# Patient Record
Sex: MALE | Race: ASIAN | NOT HISPANIC OR LATINO | Employment: OTHER | URBAN - METROPOLITAN AREA
[De-identification: names, ages, dates, MRNs, and addresses within clinical notes are randomized per-mention and may not be internally consistent; named-entity substitution may affect disease eponyms.]

---

## 2017-01-06 ENCOUNTER — PATIENT MESSAGE (OUTPATIENT)
Dept: INTERNAL MEDICINE | Facility: CLINIC | Age: 82
End: 2017-01-06

## 2017-01-07 ENCOUNTER — LAB VISIT (OUTPATIENT)
Dept: LAB | Facility: HOSPITAL | Age: 82
End: 2017-01-07
Attending: INTERNAL MEDICINE
Payer: COMMERCIAL

## 2017-01-07 DIAGNOSIS — E78.2 MIXED HYPERLIPIDEMIA: ICD-10-CM

## 2017-01-07 DIAGNOSIS — I10 HTN (HYPERTENSION), BENIGN: ICD-10-CM

## 2017-01-07 DIAGNOSIS — E11.49 DM (DIABETES MELLITUS), TYPE 2 WITH NEUROLOGICAL COMPLICATIONS: ICD-10-CM

## 2017-01-07 LAB
ALBUMIN SERPL BCP-MCNC: 3.3 G/DL
ALP SERPL-CCNC: 76 U/L
ALT SERPL W/O P-5'-P-CCNC: 14 U/L
ANION GAP SERPL CALC-SCNC: 8 MMOL/L
AST SERPL-CCNC: 21 U/L
BILIRUB SERPL-MCNC: 0.7 MG/DL
BUN SERPL-MCNC: 23 MG/DL
CALCIUM SERPL-MCNC: 9.2 MG/DL
CHLORIDE SERPL-SCNC: 103 MMOL/L
CHOLEST/HDLC SERPL: 4 {RATIO}
CO2 SERPL-SCNC: 25 MMOL/L
CREAT SERPL-MCNC: 1 MG/DL
EST. GFR  (AFRICAN AMERICAN): >60 ML/MIN/1.73 M^2
EST. GFR  (NON AFRICAN AMERICAN): >60 ML/MIN/1.73 M^2
GLUCOSE SERPL-MCNC: 119 MG/DL
HDL/CHOLESTEROL RATIO: 25 %
HDLC SERPL-MCNC: 172 MG/DL
HDLC SERPL-MCNC: 43 MG/DL
LDLC SERPL CALC-MCNC: 91 MG/DL
NONHDLC SERPL-MCNC: 129 MG/DL
POTASSIUM SERPL-SCNC: 4.5 MMOL/L
PROT SERPL-MCNC: 6.6 G/DL
SODIUM SERPL-SCNC: 136 MMOL/L
T4 FREE SERPL-MCNC: 1 NG/DL
TRIGL SERPL-MCNC: 190 MG/DL
TSH SERPL DL<=0.005 MIU/L-ACNC: 2.47 UIU/ML

## 2017-01-07 PROCEDURE — 80053 COMPREHEN METABOLIC PANEL: CPT

## 2017-01-07 PROCEDURE — 84439 ASSAY OF FREE THYROXINE: CPT

## 2017-01-07 PROCEDURE — 36415 COLL VENOUS BLD VENIPUNCTURE: CPT | Mod: PO

## 2017-01-07 PROCEDURE — 83036 HEMOGLOBIN GLYCOSYLATED A1C: CPT

## 2017-01-07 PROCEDURE — 84443 ASSAY THYROID STIM HORMONE: CPT

## 2017-01-07 PROCEDURE — 80061 LIPID PANEL: CPT

## 2017-01-09 LAB
ESTIMATED AVG GLUCOSE: 154 MG/DL
HBA1C MFR BLD HPLC: 7 %

## 2017-01-11 ENCOUNTER — TELEPHONE (OUTPATIENT)
Dept: ENDOCRINOLOGY | Facility: CLINIC | Age: 82
End: 2017-01-11

## 2017-01-13 ENCOUNTER — OFFICE VISIT (OUTPATIENT)
Dept: ENDOCRINOLOGY | Facility: CLINIC | Age: 82
End: 2017-01-13
Payer: COMMERCIAL

## 2017-01-13 VITALS
DIASTOLIC BLOOD PRESSURE: 72 MMHG | WEIGHT: 155 LBS | HEART RATE: 71 BPM | BODY MASS INDEX: 24.91 KG/M2 | SYSTOLIC BLOOD PRESSURE: 115 MMHG | HEIGHT: 66 IN

## 2017-01-13 DIAGNOSIS — E78.1 PURE HYPERGLYCERIDEMIA: ICD-10-CM

## 2017-01-13 DIAGNOSIS — E11.49 DM (DIABETES MELLITUS), TYPE 2 WITH NEUROLOGICAL COMPLICATIONS: Primary | ICD-10-CM

## 2017-01-13 DIAGNOSIS — R10.13 EPIGASTRIC PAIN: ICD-10-CM

## 2017-01-13 DIAGNOSIS — I10 ESSENTIAL HYPERTENSION: ICD-10-CM

## 2017-01-13 DIAGNOSIS — M85.80 OSTEOPENIA: ICD-10-CM

## 2017-01-13 PROCEDURE — 1159F MED LIST DOCD IN RCRD: CPT | Mod: S$GLB,,, | Performed by: INTERNAL MEDICINE

## 2017-01-13 PROCEDURE — 99214 OFFICE O/P EST MOD 30 MIN: CPT | Mod: S$GLB,,, | Performed by: INTERNAL MEDICINE

## 2017-01-13 PROCEDURE — 1157F ADVNC CARE PLAN IN RCRD: CPT | Mod: S$GLB,,, | Performed by: INTERNAL MEDICINE

## 2017-01-13 PROCEDURE — 99999 PR PBB SHADOW E&M-EST. PATIENT-LVL III: CPT | Mod: PBBFAC,,, | Performed by: INTERNAL MEDICINE

## 2017-01-13 PROCEDURE — 1160F RVW MEDS BY RX/DR IN RCRD: CPT | Mod: S$GLB,,, | Performed by: INTERNAL MEDICINE

## 2017-01-13 PROCEDURE — 1126F AMNT PAIN NOTED NONE PRSNT: CPT | Mod: S$GLB,,, | Performed by: INTERNAL MEDICINE

## 2017-01-13 NOTE — PROGRESS NOTES
Subjective:      Patient ID: Sherri Bunch is a 89 y.o. male.    Chief Complaint:  Diabetes Mellitus    History of Present Illness  89 y/old male that comes with daughter who translate for him since he only speaks chinese. He has DM type 2 since 2015 being treated with diet. BG at home is in the 140's in AM. He has some cognitive dysfunction and occ misses to eat lunch. Since last month he is complaining of stomach discomfort and is eating less. + Tiredness since last month. + Nocturia that is frequent. No numbness or tingling of the feet. Has pain in left left. No blurry vision. Last eye exam was on 9/29/16. Has mild swelling of the legs. Weight gain of 2 lbs since last visit on 7/13/16. Has hypertriglyceridemia that is being monitored.    Review of Systems   Constitutional: Positive for fatigue.   HENT: Positive for postnasal drip. Negative for trouble swallowing.    Eyes: Negative for visual disturbance.   Respiratory: Negative for shortness of breath.    Cardiovascular: Positive for chest pain and leg swelling. Negative for palpitations.   Gastrointestinal: Positive for constipation. Negative for diarrhea.        Occasional   Endocrine: Negative for cold intolerance and heat intolerance.   Musculoskeletal: Positive for back pain. Negative for arthralgias.   Neurological: Negative for headaches.   Hematological: Does not bruise/bleed easily.   Psychiatric/Behavioral: Negative for sleep disturbance.   All other systems reviewed and are negative.      Objective:   Physical Exam   Constitutional: He is oriented to person, place, and time. He appears well-developed and well-nourished. No distress.   HENT:   Right Ear: External ear normal.   Left Ear: External ear normal.   Eyes: EOM are normal. Right eye exhibits discharge. No scleral icterus.   Dry secretion in right eye   Neck: Normal range of motion. Neck supple.   No thyroid nodules palpable   Cardiovascular: Normal rate, regular rhythm, normal heart sounds and  intact distal pulses.    No murmur heard.  Pulses:       Dorsalis pedis pulses are 2+ on the right side, and 2+ on the left side.   Pulmonary/Chest: Effort normal and breath sounds normal.   Abdominal: Soft. He exhibits no distension and no mass. There is no tenderness.   Musculoskeletal: He exhibits edema.        Right foot: There is normal range of motion and no deformity.        Left foot: There is normal range of motion and no deformity.   Gait impairment and he shuffles his feet when he walks. Trace edema in the ankles.    Feet:   Right Foot:   Protective Sensation: 10 sites tested. 10 sites sensed.   Skin Integrity: Positive for callus. Negative for ulcer, blister, skin breakdown, erythema, warmth or dry skin.   Left Foot:   Protective Sensation: 10 sites tested. 10 sites sensed.   Skin Integrity: Positive for callus. Negative for ulcer, blister, skin breakdown, erythema, warmth or dry skin.   Lymphadenopathy:     He has no cervical adenopathy.        Right: No supraclavicular adenopathy present.        Left: No supraclavicular adenopathy present.   Neurological: He is alert and oriented to person, place, and time.   Intact monofilament. Decrease vibration in the feet, R>L. DTR's are decreased.   Skin: Skin is warm and dry.   Psychiatric: He has a normal mood and affect. His behavior is normal.   Vitals reviewed.  FEET: + onychomycosis    Lab Review:    Results for orders placed or performed in visit on 01/07/17   Comprehensive metabolic panel   Result Value Ref Range    Sodium 136 136 - 145 mmol/L    Potassium 4.5 3.5 - 5.1 mmol/L    Chloride 103 95 - 110 mmol/L    CO2 25 23 - 29 mmol/L    Glucose 119 (H) 70 - 110 mg/dL    BUN, Bld 23 8 - 23 mg/dL    Creatinine 1.0 0.5 - 1.4 mg/dL    Calcium 9.2 8.7 - 10.5 mg/dL    Total Protein 6.6 6.0 - 8.4 g/dL    Albumin 3.3 (L) 3.5 - 5.2 g/dL    Total Bilirubin 0.7 0.1 - 1.0 mg/dL    Alkaline Phosphatase 76 55 - 135 U/L    AST 21 10 - 40 U/L    ALT 14 10 - 44 U/L     Anion Gap 8 8 - 16 mmol/L    eGFR if African American >60.0 >60 mL/min/1.73 m^2    eGFR if non African American >60.0 >60 mL/min/1.73 m^2   Hemoglobin A1c   Result Value Ref Range    Hemoglobin A1C 7.0 (H) 4.5 - 6.2 %    Estimated Avg Glucose 154 (H) 68 - 131 mg/dL   Lipid panel   Result Value Ref Range    Cholesterol 172 120 - 199 mg/dL    Triglycerides 190 (H) 30 - 150 mg/dL    HDL 43 40 - 75 mg/dL    LDL Cholesterol 91.0 63.0 - 159.0 mg/dL    HDL/Chol Ratio 25.0 20.0 - 50.0 %    Total Cholesterol/HDL Ratio 4.0 2.0 - 5.0    Non-HDL Cholesterol 129 mg/dL   TSH   Result Value Ref Range    TSH 2.467 0.400 - 4.000 uIU/mL   T4, free   Result Value Ref Range    Free T4 1.00 0.71 - 1.51 ng/dL       BONE MINERAL DENSITY RESULTS:  Lumbar Spine: Lumbar bone mineral density L1-L4 is 1.121g/cm2, which is a t-score of 0.3. The z-score is .    Total Hip: The total hip bone mineral density is 0.767g/cm2.  The t-score is -1.8, and the z-score is .  Femoral neck BMD is 0.636g/cm2 and the t-score is -2.2.      COMPARISONS: No Previous studies available.   Impression     Osteopenia of the hip. FRAX calculation does not support treatment for osteoporosis         Assessment:     1. DM (diabetes mellitus), type 2 with neurological complications     2. Pure hyperglyceridemia     3. Essential hypertension     4. Epigastric pain  Ambulatory Referral to Gastroenterology       1. 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 A1c is slightly above goal but it is fine for him. No treatment needed at this time except for diet and exercise as tolerated. No proteinuria or retinopathy.  Needs podiatry follow up of feet. To follow up with PCP. To send him back to us if A1c rises or BG > 200.  2. Hypertriglyceridemia that is not at goal with high Trig. No treatment needed and will monitor  3. HTN is controlled. To continue taking same meds  4. Abdominal discomfort affecting appetite. Will have him see GI  5. Osteopenia of the hip but no therapy  is needed. Last vitamin D level is normal. To continue taking calcium with D daily.     Plan:     RTC prn  Please schedule gastroenterology appt for gastro evaluation

## 2017-01-13 NOTE — MR AVS SNAPSHOT
[facility]  [address]  [facility] [address]  Phone: [phone]                  Sherri Bunch   2017 2:00 PM   Office Visit    Description:  Male : 10/24/1927   Provider:  Noemi Wing MD   Department:  [facility]           Reason for Visit     Diabetes Mellitus           Diagnoses this Visit        Comments    DM (diabetes mellitus), type 2 with neurological complications    -  Primary     Pure hyperglyceridemia         Essential hypertension         Epigastric pain         Osteopenia                To Do List           Future Appointments        Provider Department Dept Phone    2017 3:00 PM Guillaume Daly Jr., [facility] - Podiat[facility] [phone]      Goals (5 Years of Data)     None      Follow-Up and Disposition     Return if symptoms worsen or fail to improve.      [facility]     [facility] Nurse [facility] -  Assistance  Registered nurses in the [facility] On Call Center provide clinical advisement, health education, appointment booking, and other advisory services.  Call for this free service at [phone].             Medications           Message regarding Medications     Verify the changes and/or additions to your medication regime listed below are the same as discussed with your clinician today.  If any of these changes or additions are incorrect, please notify your healthcare provider.        STOP taking these medications     FLUZONE HIGH-DOSE -, PF, 180 mcg/0.5 mL Syrg     ZOSTAVAX, PF, 19,400 unit/0.65 mL injection TO BE ADMINISTERED BY PHARMACIST FOR IMMUNIZATION    escitalopram oxalate (LEXAPRO) 5 MG Tab Take 1 tablet (5 mg total) by mouth once daily.    fluticasone (FLONASE) 50 mcg/actuation nasal spray 2 sprays by Each Nare route once daily.    levocetirizine (XYZAL) 5 MG tablet Take 1 tablet (5 mg total) by mouth every evening.           Verify that the below list of medications is an accurate  "representation of the medications you are currently taking.  If none reported, the list may be blank. If incorrect, please contact your healthcare provider. Carry this list with you in case of emergency.           Current Medications     b complex vitamins capsule Take 1 capsule by mouth once daily.    CALCIUM CARBONATE/VITAMIN D3 (CALCIUM 600 WITH VITAMIN D3 ORAL) Take 1 tablet by mouth once daily.    fish oil-omega-3 fatty acids 300-1,000 mg capsule Take 1 g by mouth once daily.    hydrochlorothiazide (MICROZIDE) 12.5 mg capsule     LYCOPENE ORAL Take 5 mg by mouth once daily.    memantine (NAMENDA) 10 MG Tab Take 1 tablet (10 mg total) by mouth once daily.    metoprolol succinate (TOPROL-XL) 25 MG 24 hr tablet     multivitamin (ONE DAILY MULTIVITAMIN) per tablet Take 1 tablet by mouth once daily.    neomycin-polymyxin-hydrocortisone (CORTISPORIN) 3.5-10,000-1 mg/mL-unit/mL-% otic suspension Use bid for nails    PRADAXA 75 mg Cap     saw pal-pumpkin [id number] (SAW PALMETTO COMPLEX,PUMPK-ZN,) 320-40-10-15 mg Cap Take 1 capsule by mouth once daily.    FLUZONE HIGH-DOSE 2016-17, PF, 180 mcg/0.5 mL Syrg TO BE ADMINISTERED BY PHARMACIST FOR IMMUNIZATION           Clinical Reference Information           Vital Signs - Last Recorded  Most recent update: 1/13/2017  2:24 PM by Rufina Story MA    BP Pulse Ht Wt BMI    115/72 71 5' 6" (1.676 m) 70.3 kg (155 lb) 25.02 kg/m2      Blood Pressure          Most Recent Value    BP  115/72      Allergies as of 1/13/2017     No Known Allergies      Immunizations Administered on Date of Encounter - 1/13/2017     None      Orders Placed During Today's Visit      Normal Orders This Visit    Ambulatory Referral to Gastroenterology       "

## 2017-01-18 ENCOUNTER — OFFICE VISIT (OUTPATIENT)
Dept: PODIATRY | Facility: CLINIC | Age: 82
End: 2017-01-18
Payer: COMMERCIAL

## 2017-01-18 VITALS
SYSTOLIC BLOOD PRESSURE: 143 MMHG | BODY MASS INDEX: 24.91 KG/M2 | HEART RATE: 67 BPM | DIASTOLIC BLOOD PRESSURE: 62 MMHG | HEIGHT: 66 IN | WEIGHT: 155 LBS

## 2017-01-18 DIAGNOSIS — E11.49 DM (DIABETES MELLITUS), TYPE 2 WITH NEUROLOGICAL COMPLICATIONS: Primary | ICD-10-CM

## 2017-01-18 DIAGNOSIS — B35.1 ONYCHOMYCOSIS DUE TO DERMATOPHYTE: ICD-10-CM

## 2017-01-18 PROCEDURE — 99213 OFFICE O/P EST LOW 20 MIN: CPT | Mod: 25,S$GLB,, | Performed by: PODIATRIST

## 2017-01-18 PROCEDURE — 99999 PR PBB SHADOW E&M-EST. PATIENT-LVL III: CPT | Mod: PBBFAC,,, | Performed by: PODIATRIST

## 2017-01-18 NOTE — MR AVS SNAPSHOT
[place] - [facility]iatry   [facility]  [place] [address]  Phone: [phone]                  Sherri Bunch   2017 3:00 PM   Office Visit    Description:  Male : 10/24/1927   Provider:  Guillaume Daly Jr., DPM   Department:  [place] - Podiatry           Reason for Visit     Nail Care           Diagnoses this Visit        Comments    DM (diabetes mellitus), type 2 with neurological complications    -  Primary     Onychomycosis due to dermatophyte                To Do List           Future Appointments        Provider Department Dept Phone    2/3/2017 3:00 PM [facility] - G[facility] [phone]      Goals (5 Years of Data)     None      Follow-Up and Disposition     Return in about 6 months (around 2017).      [facility]     [facility] On Call Nurse [facility] -  Assistance  Registered nurses in the [facility] On Call Center provide clinical advisement, health education, appointment booking, and other advisory services.  Call for this free service at [phone].             Medications           Message regarding Medications     Verify the changes and/or additions to your medication regime listed below are the same as discussed with your clinician today.  If any of these changes or additions are incorrect, please notify your healthcare provider.             Verify that the below list of medications is an accurate representation of the medications you are currently taking.  If none reported, the list may be blank. If incorrect, please contact your healthcare provider. Carry this list with you in case of emergency.           Current Medications     b complex vitamins capsule Take 1 capsule by mouth once daily.    CALCIUM CARBONATE/VITAMIN D3 (CALCIUM 600 WITH VITAMIN D3 ORAL) Take 1 tablet by mouth once daily.    fish oil-omega-3 fatty acids 300-1,000 mg capsule Take 1 g by mouth once daily.    FLUZONE HIGH-DOSE , PF, 180 mcg/0.5 mL Syrg TO BE  "ADMINISTERED BY PHARMACIST FOR IMMUNIZATION    hydrochlorothiazide (MICROZIDE) 12.5 mg capsule     LYCOPENE ORAL Take 5 mg by mouth once daily.    metoprolol succinate (TOPROL-XL) 25 MG 24 hr tablet     multivitamin (ONE DAILY MULTIVITAMIN) per tablet Take 1 tablet by mouth once daily.    neomycin-polymyxin-hydrocortisone (CORTISPORIN) 3.5-10,000-1 mg/mL-unit/mL-% otic suspension Use bid for nails    PRADAXA 75 mg Cap     saw pal-pumpkin [id number] (SAW PALMETTO COMPLEX,PUMPK-ZN,) 320-40-10-15 mg Cap Take 1 capsule by mouth once daily.    memantine (NAMENDA) 10 MG Tab Take 1 tablet (10 mg total) by mouth once daily.           Clinical Reference Information           Vital Signs - Last Recorded  Most recent update: 1/18/2017  3:13 PM by Cayla Lopez MA    BP Pulse Ht Wt BMI    (!) 143/62 67 5' 6" (1.676 m) 70.3 kg (155 lb) 25.02 kg/m2      Blood Pressure          Most Recent Value    BP  (!)  143/62      Allergies as of 1/18/2017     No Known Allergies      Immunizations Administered on Date of Encounter - 1/18/2017     None      Instructions      Diabetes: Inspecting Your Feet  Diabetes increases your chances of developing foot problems. So inspect your feet every day. This helps you find small skin irritations before they become serious ulcers or infections. If you have trouble seeing the bottoms of your feet, use a mirror or ask a family member or friend to help.     Pressure spots on the bottom of the foot are common areas where problems develop.   How to check your feet  Below are tips to help you look for foot problems. Try to check your feet at the same time each day, such as when you get out of bed in the morning:  · Check the top of each foot. The tops of toes, back of the heel, and outer edge of the foot can get a lot of rubbing from poor-fitting shoes.  · Check the bottom of each foot. Daily wear and tear often leads to problems at pressure spots.  · Check the toes and nails. Fungal infections often " occur between toes. Toenail problems can also be a sign of fungal infections or lead to breaks in the skin.  · Check your shoes, too. Loose objects inside a shoe can injure the foot. Use your hand to feel inside your shoes for things like matthieu, loose stitching, or rough areas that could irritate your skin.  Warning signs  Look for any color changes in the foot. Redness with streaks can signal a severe infection, which needs immediate medical attention. Tell your healthcare provider right away if you have any of these problems:  · Swelling, sometimes with color changes, may be a sign of poor blood flow or infection. Symptoms include tenderness and an increase in the size of your foot.  · Warm or hot areas on your feet may be signs of infection. A foot that is cold may not be getting enough blood.  · Sensations such as burning, tingling, or pins and needles can be signs of a problem. Also check for areas that may be numb.  · Hot spots are caused by friction or pressure. Look for hot spots in areas that get a lot of rubbing. Hot spots can turn into blisters, calluses, or sores.  · Cracks and sores are caused by dry or irritated skin. They are a sign that the skin is breaking down, which can lead to infection.  · Toenail problems to watch for include nails growing into the skin (ingrown toenail) and causing redness or pain. Thick, yellow, or discolored nails can signal a fungal infection.  · Drainage and odor can develop from untreated sores and ulcers. Call your healthcare provider right away if you notice white or yellow drainage, bleeding, or unpleasant odor.   © 1326-2193 [employer]. [address] [place] [address], [place] [address]. All rights reserved. This information is not intended as a substitute for professional medical care. Always follow your healthcare professional's instructions.

## 2017-01-18 NOTE — PATIENT INSTRUCTIONS
Diabetes: Inspecting Your Feet  Diabetes increases your chances of developing foot problems. So inspect your feet every day. This helps you find small skin irritations before they become serious ulcers or infections. If you have trouble seeing the bottoms of your feet, use a mirror or ask a family member or friend to help.     Pressure spots on the bottom of the foot are common areas where problems develop.   How to check your feet  Below are tips to help you look for foot problems. Try to check your feet at the same time each day, such as when you get out of bed in the morning:  · Check the top of each foot. The tops of toes, back of the heel, and outer edge of the foot can get a lot of rubbing from poor-fitting shoes.  · Check the bottom of each foot. Daily wear and tear often leads to problems at pressure spots.  · Check the toes and nails. Fungal infections often occur between toes. Toenail problems can also be a sign of fungal infections or lead to breaks in the skin.  · Check your shoes, too. Loose objects inside a shoe can injure the foot. Use your hand to feel inside your shoes for things like matthieu, loose stitching, or rough areas that could irritate your skin.  Warning signs  Look for any color changes in the foot. Redness with streaks can signal a severe infection, which needs immediate medical attention. Tell your healthcare provider right away if you have any of these problems:  · Swelling, sometimes with color changes, may be a sign of poor blood flow or infection. Symptoms include tenderness and an increase in the size of your foot.  · Warm or hot areas on your feet may be signs of infection. A foot that is cold may not be getting enough blood.  · Sensations such as burning, tingling, or pins and needles can be signs of a problem. Also check for areas that may be numb.  · Hot spots are caused by friction or pressure. Look for hot spots in areas that get a lot of rubbing. Hot spots can turn into  blisters, calluses, or sores.  · Cracks and sores are caused by dry or irritated skin. They are a sign that the skin is breaking down, which can lead to infection.  · Toenail problems to watch for include nails growing into the skin (ingrown toenail) and causing redness or pain. Thick, yellow, or discolored nails can signal a fungal infection.  · Drainage and odor can develop from untreated sores and ulcers. Call your healthcare provider right away if you notice white or yellow drainage, bleeding, or unpleasant odor.   © 5100-7866 [employer]. [address] [place] [address], [place] [address]. All rights reserved. This information is not intended as a substitute for professional medical care. Always follow your healthcare professional's instructions.

## 2017-01-18 NOTE — PROGRESS NOTES
Subjective:    Patient ID: Sherri Bunch is a 89 y.o. male.    Chief Complaint: Nail Care      HPI:   Sherri is a 89 y.o. male who presents to the clinic for evaluation and treatment of high risk feet. Sherri has a past medical history of Atrial fibrillation; Diabetes; Diabetes mellitus, type 2; Hyperlipidemia; and Hypertension. The patient's chief complaint is long, thick toenails. This patient has documented high risk feet requiring routine maintenance secondary to diabetes mellitis and those secondary complications of diabetes, as mentioned..    PCP: Elvin Ward, DO    Date Last Seen by PCP: in epic      Hemoglobin A1C   Date Value Ref Range Status   01/07/2017 7.0 (H) 4.5 - 6.2 % Final     Comment:     According to ADA guidelines, hemoglobin A1C <7.0% represents  optimal control in non-pregnant diabetic patients.  Different  metrics may apply to specific populations.   Standards of Medical Care in Diabetes - 2016.  For the purpose of screening for the presence of diabetes:  <5.7%     Consistent with the absence of diabetes  5.7-6.4%  Consistent with increasing risk for diabetes   (prediabetes)  >or=6.5%  Consistent with diabetes  Currently no consensus exists for use of hemoglobin A1C  for diagnosis of diabetes for children.       HPI    Last Podiatry Enc: Visit date not found  Last Enc w/ Me: Visit date not found    Past Medical History   Diagnosis Date    Atrial fibrillation     Diabetes     Diabetes mellitus, type 2     Hyperlipidemia     Hypertension      History reviewed. No pertinent past surgical history.  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 [place]    Alcohol use No    Drug use: No    Sexual activity: Not Currently     Other Topics Concern    Not on file     Social History Narrative         Current Outpatient Prescriptions:     b complex vitamins capsule, Take 1 capsule by mouth once daily., Disp: , Rfl:     CALCIUM CARBONATE/VITAMIN D3 (CALCIUM 600 WITH VITAMIN D3 ORAL), Take 1 tablet by mouth once daily., Disp: , Rfl:     fish oil-omega-3 fatty acids 300-1,000 mg capsule, Take 1 g by mouth once daily., Disp: , Rfl:     FLUZONE HIGH-DOSE 2016-17, PF, 180 mcg/0.5 mL Syrg, TO BE ADMINISTERED BY PHARMACIST FOR IMMUNIZATION, Disp: , Rfl: 0    hydrochlorothiazide (MICROZIDE) 12.5 mg capsule, , Disp: , Rfl:     LYCOPENE ORAL, Take 5 mg by mouth once daily., Disp: , Rfl:     metoprolol succinate (TOPROL-XL) 25 MG 24 hr tablet, , Disp: , Rfl:     multivitamin (ONE DAILY MULTIVITAMIN) per tablet, Take 1 tablet by mouth once daily., Disp: , Rfl:     neomycin-polymyxin-hydrocortisone (CORTISPORIN) 3.5-10,000-1 mg/mL-unit/mL-% otic suspension, Use bid for nails, Disp: 10 mL, Rfl: 6    PRADAXA 75 mg Cap, , Disp: , Rfl:     saw pal-pumpkin ry-[id number]-B6 (SAW PALMETTO COMPLEX,PUMPK-ZN,) 320-40-10-15 mg Cap, Take 1 capsule by mouth once daily., Disp: , Rfl:     memantine (NAMENDA) 10 MG Tab, Take 1 tablet (10 mg total) by mouth once daily., Disp: 30 tablet, Rfl: 11  Review of patient's allergies indicates:  No Known Allergies    Visit Vitals    BP (!) 143/62    Pulse 67    Ht 5' 6" (1.676 m)    Wt 70.3 kg (155 lb)    BMI 25.02 kg/m2       ROS  ROS Constitutional:  General Appearance: well nourished  Vascular: negative for cramps, edema and bruising  Musculoskeletal: negative for joint paint and joint edema  Skin: negative for rashes and lesions  Neurological: positive for burning, tingling, numbness  Gastrointestinal: negative for stomach pain, nausea and vomiting        Objective:        Ortho/SPM Exam  Physical Exam  Ortho Exam  V: " "pt 1/4 dp 1/4 b/l. edema present bilaterally, varicosities present bilaterally  N:  [facility]  [place] Neuropathy Screening:   Left Right   Normal Appearance No-1 No-1   Ulceration no-0 no-0   Achilles Reflex normal-0 normal-0   Vibratory Sensation normal-0 normal-0   10 Gram MF reduced-0.5 reduced-0.5   Total 1.5/5 1.5/5   6/10  3/10     O: lateral deviation of hallux b/l, no open lesions, POP of nails  D: elongated, thickened, dystrophic nails with subungal [id number]      Assessment:     Imaging: none        1. DM (diabetes mellitus), type 2 with neurological complications    2. Onychomycosis due to dermatophyte          Plan:       Orders Placed This Encounter    Foot Care     .  Routine Foot Care  Date/Time: 1/24/2017 7:32 PM  Performed by: SOLEDAD ALCANTARA JR.  Authorized by: SOLEDAD ALCANTARA JR.     Time out: Immediately prior to procedure a "time out" was called to verify the correct patient, procedure, equipment, support staff and site/side marked as required.    Consent Done?:  Yes (Verbal)  Hyperkeratotic Skin Lesions?: No      Nail Care Type:  Debride  Location(s): All  (Left 1st Toe, Left 3rd Toe, Left 2nd Toe, Left 4th Toe, Left 5th Toe, Right 1st Toe, Right 2nd Toe, Right 3rd Toe, Right 4th Toe and Right 5th Toe)  Patient tolerance:  Patient tolerated the procedure well with no immediate complications        Sherri Bunch is a 89 y.o. male presenting w/   1. DM (diabetes mellitus), type 2 with neurological complications    2. Onychomycosis due to dermatophyte      ADA Risk Classification: 1 - LOPS with or without deformity: rtc 3-6 months     -pt seen, evaluated, and managed  -dx discussed in detail. All questions/concerns addressed  -all tx options discussed. All alternatives, risks, benefits of all txs discussed  -The patient was educated regarding the above diagnosis. We discussed conservative care options regarding shoe wear and/or padding.  -rxs dispensed: none  -nails sharply debrided x10 manually " with nail nippers down to nail bed w/o complication  -educated pt on DM footcare    Pt to f/u in 5-6 months as scheduled    -    Return in about 6 months (around 7/18/2017).

## 2017-01-19 DIAGNOSIS — G31.84 MCI (MILD COGNITIVE IMPAIRMENT) WITH MEMORY LOSS: Primary | ICD-10-CM

## 2017-01-19 RX ORDER — MEMANTINE HYDROCHLORIDE 10 MG/1
10 TABLET ORAL DAILY
Qty: 30 TABLET | Refills: 11 | Status: SHIPPED | OUTPATIENT
Start: 2017-01-19 | End: 2017-08-08 | Stop reason: SDUPTHER

## 2017-01-24 PROCEDURE — 11721 DEBRIDE NAIL 6 OR MORE: CPT | Mod: S$GLB,,, | Performed by: PODIATRIST

## 2017-05-11 ENCOUNTER — OFFICE VISIT (OUTPATIENT)
Dept: INTERNAL MEDICINE | Facility: CLINIC | Age: 82
End: 2017-05-11
Payer: COMMERCIAL

## 2017-05-11 VITALS
HEIGHT: 68 IN | WEIGHT: 154.31 LBS | SYSTOLIC BLOOD PRESSURE: 132 MMHG | BODY MASS INDEX: 23.39 KG/M2 | RESPIRATION RATE: 16 BRPM | HEART RATE: 70 BPM | TEMPERATURE: 98 F | DIASTOLIC BLOOD PRESSURE: 58 MMHG

## 2017-05-11 DIAGNOSIS — K64.9 HEMORRHOIDS, UNSPECIFIED HEMORRHOID TYPE: Primary | ICD-10-CM

## 2017-05-11 DIAGNOSIS — R05.9 COUGH: ICD-10-CM

## 2017-05-11 PROCEDURE — 1160F RVW MEDS BY RX/DR IN RCRD: CPT | Mod: S$GLB,,, | Performed by: FAMILY MEDICINE

## 2017-05-11 PROCEDURE — 1157F ADVNC CARE PLAN IN RCRD: CPT | Mod: 8P,S$GLB,, | Performed by: FAMILY MEDICINE

## 2017-05-11 PROCEDURE — 1159F MED LIST DOCD IN RCRD: CPT | Mod: S$GLB,,, | Performed by: FAMILY MEDICINE

## 2017-05-11 PROCEDURE — 99214 OFFICE O/P EST MOD 30 MIN: CPT | Mod: S$GLB,,, | Performed by: FAMILY MEDICINE

## 2017-05-11 PROCEDURE — 1126F AMNT PAIN NOTED NONE PRSNT: CPT | Mod: S$GLB,,, | Performed by: FAMILY MEDICINE

## 2017-05-11 PROCEDURE — 99999 PR PBB SHADOW E&M-EST. PATIENT-LVL III: CPT | Mod: PBBFAC,,, | Performed by: FAMILY MEDICINE

## 2017-05-11 RX ORDER — HYDROCORTISONE ACETATE 25 MG/1
25 SUPPOSITORY RECTAL 2 TIMES DAILY PRN
Qty: 24 SUPPOSITORY | Refills: 3 | Status: SHIPPED | OUTPATIENT
Start: 2017-05-11 | End: 2017-05-21

## 2017-05-11 NOTE — MR AVS SNAPSHOT
[facility]   [facility]  [place] [address]  Phone: [phone]  Fax: [phone]                  Sherri Bunch   2017 4:20 PM   Office Visit    Description:  Male : 10/24/1927   Provider:  Ozzy Jerome MD   Department:  [facility]           Reason for Visit     Rectal Bleeding     Cough           Diagnoses this Visit        Comments    Hemorrhoids, unspecified hemorrhoid type    -  Primary     Cough                To Do List           Goals (5 Years of Data)     None      Follow-Up and Disposition     Return if symptoms worsen or fail to improve.       These Medications        Disp Refills Start End    hydrocortisone (ANUSOL-HC) 25 mg suppository 24 suppository 3 2017    Place 1 suppository (25 mg total) rectally 2 (two) times daily as needed for Hemorrhoids. - Rectal    Pharmacy: [facility]/pharmacy #[id number] - [place] - [address] Ph #: [phone]       ranitidine (ZANTAC) 300 MG tablet 30 tablet 11 2017    Take 1 tablet (300 mg total) by mouth every evening. - Oral    Pharmacy: [facility]/pharmacy #[id number] - [place] - [address] Ph #: [phone]         [facility]     [facility] -  Assistance  Unless otherwise directed by your provider, please contact [facility], our nurse care line that is available for  assistance.     Registered nurses in the [facility] provide: appointment scheduling, clinical advisement, health education, and other advisory services.  Call: [phone] (toll free)               Medications           Message regarding Medications     Verify the changes and/or additions to your medication regime listed below are the same as discussed with your clinician today.  If any of these changes or additions are incorrect, please notify your healthcare provider.        START taking these NEW medications        Refills     hydrocortisone (ANUSOL-HC) 25 mg suppository 3    Sig: Place 1 suppository (25 mg total) rectally 2 (two) times daily as needed for Hemorrhoids.    Class: Normal    Route: Rectal    ranitidine (ZANTAC) 300 MG tablet 11    Sig: Take 1 tablet (300 mg total) by mouth every evening.    Class: Normal    Route: Oral           Verify that the below list of medications is an accurate representation of the medications you are currently taking.  If none reported, the list may be blank. If incorrect, please contact your healthcare provider. Carry this list with you in case of emergency.           Current Medications     b complex vitamins capsule Take 1 capsule by mouth once daily.    CALCIUM CARBONATE/VITAMIN D3 (CALCIUM 600 WITH VITAMIN D3 ORAL) Take 1 tablet by mouth once daily.    fish oil-omega-3 fatty acids 300-1,000 mg capsule Take 1 g by mouth once daily.    FLUZONE HIGH-DOSE 2016-17, PF, 180 mcg/0.5 mL Syrg TO BE ADMINISTERED BY PHARMACIST FOR IMMUNIZATION    hydrochlorothiazide (MICROZIDE) 12.5 mg capsule     LYCOPENE ORAL Take 5 mg by mouth once daily.    memantine (NAMENDA) 10 MG Tab Take 1 tablet (10 mg total) by mouth once daily.    metoprolol succinate (TOPROL-XL) 25 MG 24 hr tablet     multivitamin (ONE DAILY MULTIVITAMIN) per tablet Take 1 tablet by mouth once daily.    neomycin-polymyxin-hydrocortisone (CORTISPORIN) 3.5-10,000-1 mg/mL-unit/mL-% otic suspension Use bid for nails    PRADAXA 75 mg Cap     saw pal-pumpkin [id number]-Ys-B6 (SAW PALMETTO COMPLEX,PUMPK-ZN,) 320-40-10-15 mg Cap Take 1 capsule by mouth once daily.    hydrocortisone (ANUSOL-HC) 25 mg suppository Place 1 suppository (25 mg total) rectally 2 (two) times daily as needed for Hemorrhoids.    ranitidine (ZANTAC) 300 MG tablet Take 1 tablet (300 mg total) by mouth every evening.           Clinical Reference Information           Your Vitals Were     BP Pulse Temp Resp Height Weight    132/58 (BP Location: Left arm, Patient Position: Sitting, BP  "Method: Manual) 70 98.2 °F (36.8 °C) (Oral) 16 5' 8" (1.727 m) 70 kg (154 lb 5.2 oz)    BMI                23.46 kg/m2          Blood Pressure          Most Recent Value    BP  (!)  132/58      Allergies as of 5/11/2017     No Known Allergies      Immunizations Administered on Date of Encounter - 5/11/2017     None      Language Assistance Services     ATTENTION: Language assistance services are available, free of charge. Please call [phone].      ATENCIÓN: Si habla anitra, tiene a ceja disposición servicios gratuitos de asistencia lingüística. Llame al [phone].     CHÚ Ý: N?u b?n nói Ti?ng Vi?t, có các d?ch v? h? tr? ngôn ng? mi?n phí dành cho b?n. G?i s? [phone].         [facility] complies with applicable Federal civil rights laws and does not discriminate on the basis of race, color, national origin, age, disability, or sex.        "

## 2017-05-11 NOTE — PROGRESS NOTES
Subjective:       Patient ID: Sherri Bunch is a 89 y.o. male.    Chief Complaint: Rectal Bleeding and Cough (blood clot when coughed)    HPI 89-year-old Chinese male presents to clinic today accompanied with his family who translates secondary to concerns of blood in the toilet this morning and also concerns of the patient with a frequent cough and on occasion coughing up a small amount of blood.  The patient has known hemorrhoids but has not complained of any rectal pain nor has had any difficulty with bowel movements.  They noted a small amount of bright red blood in the toilet bowl this morning and also noted blood on the tissue when the patient wiped.  The other concern is the fact that the patient has a frequent occasionally productive cough in the evenings.  They have placed the patient on Claritin without relief.  Review of Systems   Constitutional: Negative for appetite change, chills, fatigue and fever.   HENT: Negative for congestion, ear pain, hearing loss, postnasal drip, rhinorrhea, sinus pressure, sore throat and tinnitus.    Eyes: Negative for redness, itching and visual disturbance.   Respiratory: Positive for cough. Negative for chest tightness and shortness of breath.    Cardiovascular: Negative for chest pain and palpitations.   Gastrointestinal: Positive for anal bleeding. Negative for abdominal pain, constipation, diarrhea, nausea and vomiting.   Genitourinary: Negative for decreased urine volume, difficulty urinating, dysuria, frequency, hematuria and urgency.   Musculoskeletal: Negative for back pain, myalgias, neck pain and neck stiffness.   Skin: Negative for rash.   Neurological: Negative for dizziness, light-headedness and headaches.   Psychiatric/Behavioral: Negative.        Objective:      Physical Exam   Constitutional: He is oriented to person, place, and time. He appears well-developed and well-nourished. No distress.   HENT:   Head: Normocephalic and atraumatic.   Right Ear: External  ear normal.   Left Ear: External ear normal.   Nose: Nose normal.   Mouth/Throat: Oropharynx is clear and moist. No oropharyngeal exudate.   Eyes: Conjunctivae and EOM are normal. Pupils are equal, round, and reactive to light. Right eye exhibits no discharge. Left eye exhibits no discharge. No scleral icterus.   Neck: Normal range of motion. Neck supple. No JVD present. No tracheal deviation present. No thyromegaly present.   Cardiovascular: Normal rate, regular rhythm, normal heart sounds and intact distal pulses.  Exam reveals no gallop and no friction rub.    No murmur heard.  Pulmonary/Chest: Effort normal and breath sounds normal. No stridor. No respiratory distress. He has no wheezes. He has no rales.   Abdominal: Soft. Bowel sounds are normal. He exhibits no distension and no mass. There is no tenderness. There is no rebound and no guarding.   Genitourinary: Rectal exam shows internal hemorrhoid.   Musculoskeletal: Normal range of motion. He exhibits no edema or tenderness.   Lymphadenopathy:     He has no cervical adenopathy.   Neurological: He is alert and oriented to person, place, and time.   Skin: Skin is warm and dry. No rash noted. He is not diaphoretic. No erythema. No pallor.   Psychiatric: He has a normal mood and affect. His behavior is normal. Judgment and thought content normal.   Nursing note and vitals reviewed.      Assessment:       1. Hemorrhoids, unspecified hemorrhoid type    2. Cough        Plan:       Hemorrhoids, unspecified hemorrhoid type  -     hydrocortisone (ANUSOL-HC) 25 mg suppository; Place 1 suppository (25 mg total) rectally 2 (two) times daily as needed for Hemorrhoids.  Dispense: 24 suppository; Refill: 3    Cough  -     ranitidine (ZANTAC) 300 MG tablet; Take 1 tablet (300 mg total) by mouth every evening.  Dispense: 30 tablet; Refill: 11   Continue over-the-counter Claritin nightly.    Return to clinic as needed if symptoms persist or worsen.

## 2017-05-21 ENCOUNTER — PATIENT MESSAGE (OUTPATIENT)
Dept: INTERNAL MEDICINE | Facility: CLINIC | Age: 82
End: 2017-05-21

## 2017-05-21 DIAGNOSIS — R05.9 COUGH: ICD-10-CM

## 2017-05-22 ENCOUNTER — PATIENT MESSAGE (OUTPATIENT)
Dept: INTERNAL MEDICINE | Facility: CLINIC | Age: 82
End: 2017-05-22

## 2017-06-20 ENCOUNTER — TELEPHONE (OUTPATIENT)
Dept: INTERNAL MEDICINE | Facility: CLINIC | Age: 82
End: 2017-06-20

## 2017-06-20 DIAGNOSIS — I48.0 PAROXYSMAL ATRIAL FIBRILLATION: ICD-10-CM

## 2017-06-20 DIAGNOSIS — G31.84 MCI (MILD COGNITIVE IMPAIRMENT) WITH MEMORY LOSS: ICD-10-CM

## 2017-06-20 DIAGNOSIS — M85.80 OSTEOPENIA, UNSPECIFIED LOCATION: ICD-10-CM

## 2017-06-20 DIAGNOSIS — E78.1 PURE HYPERGLYCERIDEMIA: ICD-10-CM

## 2017-06-20 DIAGNOSIS — I10 ESSENTIAL HYPERTENSION: ICD-10-CM

## 2017-06-20 DIAGNOSIS — Z00.00 WELL ADULT EXAM: Primary | ICD-10-CM

## 2017-06-20 DIAGNOSIS — E11.49 DM (DIABETES MELLITUS), TYPE 2 WITH NEUROLOGICAL COMPLICATIONS: ICD-10-CM

## 2017-06-20 NOTE — TELEPHONE ENCOUNTER
----- Message from Sophia Lewis sent at 6/20/2017  4:00 PM CDT -----  Doctor appointment and lab have been scheduled.  Please link lab orders to the lab appointment.  Date of doctor appointment:  8-21  Physical or EP:  physical  Date of lab appointment:  8-12  Comments:

## 2017-07-11 ENCOUNTER — TELEPHONE (OUTPATIENT)
Dept: INTERNAL MEDICINE | Facility: CLINIC | Age: 82
End: 2017-07-11

## 2017-07-11 NOTE — TELEPHONE ENCOUNTER
----- Message from Sophia Lewis sent at 7/11/2017 11:55 AM CDT -----  Contact: pt daughter [phone]  Pt would like to if he can have his glucose tolerance  levels check with his labs on 8-12,please advise

## 2017-08-01 ENCOUNTER — OFFICE VISIT (OUTPATIENT)
Dept: PODIATRY | Facility: CLINIC | Age: 82
End: 2017-08-01
Payer: COMMERCIAL

## 2017-08-01 VITALS
DIASTOLIC BLOOD PRESSURE: 63 MMHG | HEIGHT: 68 IN | HEART RATE: 64 BPM | WEIGHT: 154 LBS | BODY MASS INDEX: 23.34 KG/M2 | SYSTOLIC BLOOD PRESSURE: 137 MMHG

## 2017-08-01 DIAGNOSIS — B35.1 ONYCHOMYCOSIS DUE TO DERMATOPHYTE: Primary | ICD-10-CM

## 2017-08-01 DIAGNOSIS — E11.49 DM (DIABETES MELLITUS), TYPE 2 WITH NEUROLOGICAL COMPLICATIONS: ICD-10-CM

## 2017-08-01 PROCEDURE — 99499 UNLISTED E&M SERVICE: CPT | Mod: S$GLB,,, | Performed by: PODIATRIST

## 2017-08-01 PROCEDURE — 99999 PR PBB SHADOW E&M-EST. PATIENT-LVL IV: CPT | Mod: PBBFAC,,, | Performed by: PODIATRIST

## 2017-08-01 PROCEDURE — 11721 DEBRIDE NAIL 6 OR MORE: CPT | Mod: S$GLB,,, | Performed by: PODIATRIST

## 2017-08-01 NOTE — PROGRESS NOTES
Subjective:    Patient ID: Sherri Bunch is a 89 y.o. male.    Chief Complaint: Diabetes Mellitus (dr jerome 5/11/17) and Nail Care      HPI:   Sherri is a 89 y.o. male who presents to the clinic for evaluation and treatment of high risk feet. Sherri has a past medical history of Atrial fibrillation; Diabetes; Diabetes mellitus, type 2; Hyperlipidemia; and Hypertension. The patient's chief complaint is long, thick toenails. This patient has documented high risk feet requiring routine maintenance secondary to diabetes mellitis and those secondary complications of diabetes, as mentioned..    PCP: Ozzy Jerome MD    Date Last Seen by PCP:   Chief Complaint   Patient presents with    Diabetes Mellitus     dr jerome 5/11/17    Nail Care        Hemoglobin A1C   Date Value Ref Range Status   01/07/2017 7.0 (H) 4.5 - 6.2 % Final     Comment:     According to ADA guidelines, hemoglobin A1C <7.0% represents  optimal control in non-pregnant diabetic patients.  Different  metrics may apply to specific populations.   Standards of Medical Care in Diabetes - 2016.  For the purpose of screening for the presence of diabetes:  <5.7%     Consistent with the absence of diabetes  5.7-6.4%  Consistent with increasing risk for diabetes   (prediabetes)  >or=6.5%  Consistent with diabetes  Currently no consensus exists for use of hemoglobin A1C  for diagnosis of diabetes for children.       HPI    Last Podiatry Enc: Visit date not found  Last Enc w/ Me: Visit date not found    Past Medical History:   Diagnosis Date    Atrial fibrillation     Diabetes     Diabetes mellitus, type 2     Hyperlipidemia     Hypertension      History reviewed. No pertinent surgical history.  Social History     Social History    Marital status:      Spouse name: N/A    Number of children: 2    Years of education: N/A     Occupational History    Retired      Social History Main Topics    Smoking status: Former Smoker     Packs/day: 0.15      "Start date: 7/13/1960     Quit date: 7/13/1970    Smokeless tobacco: Never Used      Comment: Retired  in [place]    Alcohol use No    Drug use: No    Sexual activity: Not Currently     Other Topics Concern    Not on file     Social History Narrative    No narrative on file         Current Outpatient Prescriptions:     b complex vitamins capsule, Take 1 capsule by mouth once daily., Disp: , Rfl:     CALCIUM CARBONATE/VITAMIN D3 (CALCIUM 600 WITH VITAMIN D3 ORAL), Take 1 tablet by mouth once daily., Disp: , Rfl:     fish oil-omega-3 fatty acids 300-1,000 mg capsule, Take 1 g by mouth once daily., Disp: , Rfl:     FLUZONE HIGH-DOSE 2016-17, PF, 180 mcg/0.5 mL Syrg, TO BE ADMINISTERED BY PHARMACIST FOR IMMUNIZATION, Disp: , Rfl: 0    hydrochlorothiazide (MICROZIDE) 12.5 mg capsule, , Disp: , Rfl:     LYCOPENE ORAL, Take 5 mg by mouth once daily., Disp: , Rfl:     memantine (NAMENDA) 10 MG Tab, Take 1 tablet (10 mg total) by mouth once daily., Disp: 30 tablet, Rfl: 11    metoprolol succinate (TOPROL-XL) 25 MG 24 hr tablet, , Disp: , Rfl:     multivitamin (ONE DAILY MULTIVITAMIN) per tablet, Take 1 tablet by mouth once daily., Disp: , Rfl:     neomycin-polymyxin-hydrocortisone (CORTISPORIN) 3.5-10,000-1 mg/mL-unit/mL-% otic suspension, Use bid for nails, Disp: 10 mL, Rfl: 6    PRADAXA 75 mg Cap, , Disp: , Rfl:     ranitidine (ZANTAC) 300 MG tablet, Take 1 tablet (300 mg total) by mouth every evening., Disp: 30 tablet, Rfl: 11    saw pal-pumpkin bm-[id number]-Ic-B6 (SAW PALMETTO COMPLEX,PUMPK-ZN,) 320-40-10-15 mg Cap, Take 1 capsule by mouth once daily., Disp: , Rfl:   Review of patient's allergies indicates:  No Known Allergies    /63   Pulse 64   Ht 5' 8" (1.727 m)   Wt 69.9 kg (154 lb)   BMI 23.42 kg/m²     ROS  ROS Constitutional:  General Appearance: well nourished  Vascular: negative for cramps, edema and bruising  Musculoskeletal: negative for joint paint and " joint edema  Skin: negative for rashes and lesions  Neurological: positive for burning, tingling, numbness  Gastrointestinal: negative for stomach pain, nausea and vomiting        Objective:        Ortho/SPM Exam  Physical Exam  Ortho Exam  V: pt 1/4 dp 1/4 b/l. edema present bilaterally, varicosities present bilaterally  N:  [facility]  [place] Neuropathy Screening:   Left Right   Normal Appearance No-1 No-1   Ulceration no-0 no-0   Achilles Reflex normal-0 normal-0   Vibratory Sensation normal-0 normal-0   10 Gram MF reduced-0.5 reduced-0.5   Total 1.5/5 1.5/5   6/10  3/10     O: lateral deviation of hallux b/l, no open lesions,    D: elongated by 1-3mm, thickened by 1-3mm, dystrophic nails with subungal debris x 10.  No open wounds.  No suspicious skin lesions      Assessment:           1. Onychomycosis due to dermatophyte    2. DM (diabetes mellitus), type 2 with neurological complications          Plan:          .  Routine Foot Care  Date/Time: 8/1/2017 1:05 PM  Performed by: PATRICK MEJÍA  Authorized by: PATRICK MEJÍA     Consent Done?:  Yes (Verbal)    Nail Care Type:  Debride  Location(s): All  (Left 1st Toe, Left 3rd Toe, Left 2nd Toe, Left 4th Toe, Left 5th Toe, Right 1st Toe, Right 2nd Toe, Right 3rd Toe, Right 4th Toe and Right 5th Toe)  Patient tolerance:  Patient tolerated the procedure well with no immediate complications     With patient's permission, the toenails mentioned above were aggressively reduced and debrided using a nail nipper, removing all offending nail and debris. The patient will continue to monitor the areas daily, inspect the feet, wear protective shoe gear when ambulatory, and moisturizer to maintain skin integrity.         Sherri Bunch is a 89 y.o. male presenting w/   1. Onychomycosis due to dermatophyte    2. DM (diabetes mellitus), type 2 with neurological complications      ADA Risk Classification: 1 - LOPS with or without deformity: rtc 3-6 months     -pt seen, evaluated, and  managed  -dx discussed in detail. All questions/concerns addressed  -all tx options discussed. All alternatives, risks, benefits of all txs discussed  -The patient was educated regarding the above diagnosis. We discussed conservative care options regarding shoe wear and/or padding.  -rxs dispensed: none.    Consider Kerasal Nail.  Available OTC.     Return in about 3 months (around 11/1/2017) for foot care, or sooner if concerned.

## 2017-08-07 ENCOUNTER — PATIENT MESSAGE (OUTPATIENT)
Dept: NEUROLOGY | Facility: CLINIC | Age: 82
End: 2017-08-07

## 2017-08-07 DIAGNOSIS — G31.84 MCI (MILD COGNITIVE IMPAIRMENT) WITH MEMORY LOSS: ICD-10-CM

## 2017-08-07 RX ORDER — HYDROCORTISONE ACETATE 25 MG/1
SUPPOSITORY RECTAL
Refills: 3 | COMMUNITY
Start: 2017-05-25 | End: 2018-10-08

## 2017-08-08 ENCOUNTER — TELEPHONE (OUTPATIENT)
Dept: NEUROLOGY | Facility: CLINIC | Age: 82
End: 2017-08-08

## 2017-08-08 RX ORDER — MEMANTINE HYDROCHLORIDE 10 MG/1
10 TABLET ORAL DAILY
Qty: 30 TABLET | Refills: 11 | Status: SHIPPED | OUTPATIENT
Start: 2017-08-08 | End: 2017-09-11 | Stop reason: SDUPTHER

## 2017-08-08 NOTE — TELEPHONE ENCOUNTER
----- Message from Rob Mac sent at 8/8/2017 10:48 AM CDT -----  Contact: Daughter  x_  1st Request  _  2nd Request  _  3rd Request    Please refill the medication(s) listed below. Please call the patient when the prescription(s) is ready for  at the phone number ([phone]) .    Medication #1: Memantine 10 mg      Preferred Pharmacy:[facility]/PHARMACY #[id number] - [place]  [address] [facility]  [phone]

## 2017-08-12 ENCOUNTER — LAB VISIT (OUTPATIENT)
Dept: LAB | Facility: HOSPITAL | Age: 82
End: 2017-08-12
Attending: FAMILY MEDICINE
Payer: COMMERCIAL

## 2017-08-12 DIAGNOSIS — I10 ESSENTIAL HYPERTENSION: ICD-10-CM

## 2017-08-12 DIAGNOSIS — Z00.00 WELL ADULT EXAM: ICD-10-CM

## 2017-08-12 DIAGNOSIS — E11.49 DM (DIABETES MELLITUS), TYPE 2 WITH NEUROLOGICAL COMPLICATIONS: ICD-10-CM

## 2017-08-12 DIAGNOSIS — G31.84 MCI (MILD COGNITIVE IMPAIRMENT) WITH MEMORY LOSS: ICD-10-CM

## 2017-08-12 DIAGNOSIS — E78.1 PURE HYPERGLYCERIDEMIA: ICD-10-CM

## 2017-08-12 DIAGNOSIS — M85.80 OSTEOPENIA, UNSPECIFIED LOCATION: ICD-10-CM

## 2017-08-12 DIAGNOSIS — I48.0 PAROXYSMAL ATRIAL FIBRILLATION: ICD-10-CM

## 2017-08-12 LAB
25(OH)D3+25(OH)D2 SERPL-MCNC: 36 NG/ML
ALBUMIN SERPL BCP-MCNC: 3.4 G/DL
ALP SERPL-CCNC: 77 U/L
ALT SERPL W/O P-5'-P-CCNC: 13 U/L
ANION GAP SERPL CALC-SCNC: 8 MMOL/L
AST SERPL-CCNC: 20 U/L
BASOPHILS # BLD AUTO: 0.06 K/UL
BASOPHILS NFR BLD: 1.2 %
BILIRUB SERPL-MCNC: 0.8 MG/DL
BUN SERPL-MCNC: 18 MG/DL
CALCIUM SERPL-MCNC: 9.2 MG/DL
CHLORIDE SERPL-SCNC: 103 MMOL/L
CHOLEST/HDLC SERPL: 4.2 {RATIO}
CO2 SERPL-SCNC: 26 MMOL/L
CREAT SERPL-MCNC: 1 MG/DL
DIFFERENTIAL METHOD: NORMAL
EOSINOPHIL # BLD AUTO: 0.1 K/UL
EOSINOPHIL NFR BLD: 2.1 %
ERYTHROCYTE [DISTWIDTH] IN BLOOD BY AUTOMATED COUNT: 13.9 %
EST. GFR  (AFRICAN AMERICAN): >60 ML/MIN/1.73 M^2
EST. GFR  (NON AFRICAN AMERICAN): >60 ML/MIN/1.73 M^2
ESTIMATED AVG GLUCOSE: 157 MG/DL
GLUCOSE SERPL-MCNC: 119 MG/DL
HBA1C MFR BLD HPLC: 7.1 %
HCT VFR BLD AUTO: 47.3 %
HDL/CHOLESTEROL RATIO: 23.8 %
HDLC SERPL-MCNC: 164 MG/DL
HDLC SERPL-MCNC: 39 MG/DL
HGB BLD-MCNC: 15.8 G/DL
LDLC SERPL CALC-MCNC: 88.6 MG/DL
LYMPHOCYTES # BLD AUTO: 1.8 K/UL
LYMPHOCYTES NFR BLD: 34.9 %
MCH RBC QN AUTO: 29.6 PG
MCHC RBC AUTO-ENTMCNC: 33.4 G/DL
MCV RBC AUTO: 89 FL
MONOCYTES # BLD AUTO: 0.5 K/UL
MONOCYTES NFR BLD: 9.6 %
NEUTROPHILS # BLD AUTO: 2.7 K/UL
NEUTROPHILS NFR BLD: 52 %
NONHDLC SERPL-MCNC: 125 MG/DL
PLATELET # BLD AUTO: 240 K/UL
PMV BLD AUTO: 10.9 FL
POTASSIUM SERPL-SCNC: 4.3 MMOL/L
PROT SERPL-MCNC: 6.6 G/DL
RBC # BLD AUTO: 5.34 M/UL
SODIUM SERPL-SCNC: 137 MMOL/L
T4 FREE SERPL-MCNC: 1 NG/DL
TRIGL SERPL-MCNC: 182 MG/DL
TSH SERPL DL<=0.005 MIU/L-ACNC: 3.13 UIU/ML
WBC # BLD AUTO: 5.21 K/UL

## 2017-08-12 PROCEDURE — 80061 LIPID PANEL: CPT

## 2017-08-12 PROCEDURE — 82306 VITAMIN D 25 HYDROXY: CPT

## 2017-08-12 PROCEDURE — 80053 COMPREHEN METABOLIC PANEL: CPT

## 2017-08-12 PROCEDURE — 85025 COMPLETE CBC W/AUTO DIFF WBC: CPT

## 2017-08-12 PROCEDURE — 84443 ASSAY THYROID STIM HORMONE: CPT

## 2017-08-12 PROCEDURE — 84439 ASSAY OF FREE THYROXINE: CPT

## 2017-08-12 PROCEDURE — 83036 HEMOGLOBIN GLYCOSYLATED A1C: CPT

## 2017-08-12 PROCEDURE — 36415 COLL VENOUS BLD VENIPUNCTURE: CPT | Mod: PO

## 2017-08-21 ENCOUNTER — OFFICE VISIT (OUTPATIENT)
Dept: INTERNAL MEDICINE | Facility: CLINIC | Age: 82
End: 2017-08-21
Payer: COMMERCIAL

## 2017-08-21 VITALS
RESPIRATION RATE: 18 BRPM | OXYGEN SATURATION: 98 % | HEIGHT: 68 IN | HEART RATE: 71 BPM | BODY MASS INDEX: 23.26 KG/M2 | TEMPERATURE: 98 F | DIASTOLIC BLOOD PRESSURE: 74 MMHG | WEIGHT: 153.44 LBS | SYSTOLIC BLOOD PRESSURE: 138 MMHG

## 2017-08-21 DIAGNOSIS — E11.69 ONYCHOMYCOSIS OF MULTIPLE TOENAILS WITH TYPE 2 DIABETES MELLITUS AND PERIPHERAL NEUROPATHY: ICD-10-CM

## 2017-08-21 DIAGNOSIS — E11.42 ONYCHOMYCOSIS OF MULTIPLE TOENAILS WITH TYPE 2 DIABETES MELLITUS AND PERIPHERAL NEUROPATHY: ICD-10-CM

## 2017-08-21 DIAGNOSIS — M85.80 OSTEOPENIA, UNSPECIFIED LOCATION: ICD-10-CM

## 2017-08-21 DIAGNOSIS — B35.1 ONYCHOMYCOSIS OF MULTIPLE TOENAILS WITH TYPE 2 DIABETES MELLITUS AND PERIPHERAL NEUROPATHY: ICD-10-CM

## 2017-08-21 DIAGNOSIS — G31.84 MCI (MILD COGNITIVE IMPAIRMENT) WITH MEMORY LOSS: ICD-10-CM

## 2017-08-21 DIAGNOSIS — I48.0 PAROXYSMAL ATRIAL FIBRILLATION: ICD-10-CM

## 2017-08-21 DIAGNOSIS — I10 ESSENTIAL HYPERTENSION: ICD-10-CM

## 2017-08-21 DIAGNOSIS — Z00.00 WELL ADULT EXAM: Primary | ICD-10-CM

## 2017-08-21 DIAGNOSIS — E11.49 DM (DIABETES MELLITUS), TYPE 2 WITH NEUROLOGICAL COMPLICATIONS: ICD-10-CM

## 2017-08-21 DIAGNOSIS — E78.1 PURE HYPERGLYCERIDEMIA: ICD-10-CM

## 2017-08-21 PROCEDURE — 99999 PR PBB SHADOW E&M-EST. PATIENT-LVL III: CPT | Mod: PBBFAC,,, | Performed by: FAMILY MEDICINE

## 2017-08-21 PROCEDURE — 99397 PER PM REEVAL EST PAT 65+ YR: CPT | Mod: S$GLB,,, | Performed by: FAMILY MEDICINE

## 2017-08-21 RX ORDER — NEOMYCIN SULFATE, POLYMYXIN B SULFATE AND HYDROCORTISONE 10; 3.5; 1 MG/ML; MG/ML; [USP'U]/ML
SUSPENSION/ DROPS AURICULAR (OTIC)
Qty: 10 ML | Refills: 11 | Status: SHIPPED | OUTPATIENT
Start: 2017-08-21 | End: 2018-10-05 | Stop reason: ALTCHOICE

## 2017-08-21 NOTE — PROGRESS NOTES
Subjective:       Patient ID: Sherri Bunch is a 89 y.o. male.    Chief Complaint: Annual Exam    HPI 89-year-old male presents to clinic today accompanied by his daughter to establish care.  He is a former patient of Dr. Ward.  He continues to be followed by [facility] cardiology secondary to paroxysmal atrial fibrillation which is currently stable on Pradaxa, Multaq, hydrochlorothiazide, and metoprolol.  He continues to have diet-controlled type 2 diabetes with most recent A1c of 7.1.  He is followed by neurology secondary to mild cognitive impairment which is stable on Namenda.  He continues to see podiatry secondary to continued peripheral neuropathy and onychomycoses.  He has been treated with Cortisporin cream in the past with improvement of symptoms.  He continues to have a mild cough which has improved with the use of Zantac daily.  He has no significant past surgical history.  He has a family history of heart disease.  He is up-to-date with all vaccinations.  Review of Systems   Constitutional: Negative for appetite change, chills, fatigue and fever.   HENT: Negative for congestion, ear pain, hearing loss, postnasal drip, rhinorrhea, sinus pressure, sore throat and tinnitus.    Eyes: Negative for redness, itching and visual disturbance.   Respiratory: Negative for cough, chest tightness and shortness of breath.    Cardiovascular: Negative for chest pain and palpitations.   Gastrointestinal: Negative for abdominal pain, constipation, diarrhea, nausea and vomiting.   Genitourinary: Negative for decreased urine volume, difficulty urinating, dysuria, frequency, hematuria and urgency.   Musculoskeletal: Positive for back pain. Negative for myalgias, neck pain and neck stiffness.   Skin: Negative for rash.   Neurological: Negative for dizziness, light-headedness and headaches.   Psychiatric/Behavioral: Negative.        Objective:      Physical Exam   Constitutional: He is oriented to person, place, and  time. He appears well-developed and well-nourished. No distress.   HENT:   Head: Normocephalic and atraumatic.   Right Ear: External ear normal.   Left Ear: External ear normal.   Nose: Nose normal.   Mouth/Throat: Oropharynx is clear and moist. No oropharyngeal exudate.   Eyes: Conjunctivae and EOM are normal. Pupils are equal, round, and reactive to light. Right eye exhibits no discharge. Left eye exhibits no discharge. No scleral icterus.   Neck: Normal range of motion. Neck supple. No JVD present. No tracheal deviation present. No thyromegaly present.   Cardiovascular: Normal rate, regular rhythm, normal heart sounds and intact distal pulses.  Exam reveals no gallop and no friction rub.    No murmur heard.  Pulmonary/Chest: Effort normal and breath sounds normal. No stridor. No respiratory distress. He has no wheezes. He has no rales.   Abdominal: Soft. Bowel sounds are normal. He exhibits no distension and no mass. There is no tenderness. There is no rebound and no guarding.   Musculoskeletal: Normal range of motion. He exhibits no edema or tenderness.   Lymphadenopathy:     He has no cervical adenopathy.   Neurological: He is alert and oriented to person, place, and time.   Skin: Skin is warm and dry. No rash noted. He is not diaphoretic. No erythema. No pallor.   Onychomycoses of fingernails and toenails     Psychiatric: He has a normal mood and affect. His behavior is normal. Judgment and thought content normal.   Nursing note and vitals reviewed.      Assessment:       1. Well adult exam    2. Paroxysmal atrial fibrillation    3. Essential hypertension    4. Pure hyperglyceridemia    5. DM (diabetes mellitus), type 2 with neurological complications    6. Onychomycosis of multiple toenails with type 2 diabetes mellitus and peripheral neuropathy    7. MCI (mild cognitive impairment) with memory loss    8. Osteopenia, unspecified location        Plan:       1.  Labs have been reviewed and are within normal  limits.  2.  Continue follow-up with cardiology at [facility] and continue Multaq, Pradaxa, hydrochlorothiazide, and metoprolol as prescribed.  Atrial fibrillation hypertension are stable.  3.  Continue diet and exercise.  Hypertriglyceridemia and diabetes are stable.  4.  Continue follow-up with podiatry as scheduled and continue Cortisporin cream as prescribed.  5.  Continue follow-up with neurology as scheduled and continue Namenda as prescribed.  6.  Return to clinic as needed or in one year for annual exam.

## 2017-08-23 ENCOUNTER — OFFICE VISIT (OUTPATIENT)
Dept: URGENT CARE | Facility: CLINIC | Age: 82
End: 2017-08-23
Payer: COMMERCIAL

## 2017-08-23 VITALS
OXYGEN SATURATION: 95 % | RESPIRATION RATE: 18 BRPM | SYSTOLIC BLOOD PRESSURE: 122 MMHG | HEART RATE: 90 BPM | TEMPERATURE: 97 F | HEIGHT: 67 IN | DIASTOLIC BLOOD PRESSURE: 58 MMHG | WEIGHT: 145 LBS | BODY MASS INDEX: 22.76 KG/M2

## 2017-08-23 DIAGNOSIS — R53.1 WEAKNESS: ICD-10-CM

## 2017-08-23 DIAGNOSIS — R50.9 FEVER, UNSPECIFIED FEVER CAUSE: Primary | ICD-10-CM

## 2017-08-23 LAB
CTP QC/QA: YES
S PYO RRNA THROAT QL PROBE: NEGATIVE

## 2017-08-23 PROCEDURE — 99213 OFFICE O/P EST LOW 20 MIN: CPT | Mod: 25,S$GLB,, | Performed by: INTERNAL MEDICINE

## 2017-08-23 PROCEDURE — 87880 STREP A ASSAY W/OPTIC: CPT | Mod: QW,S$GLB,, | Performed by: INTERNAL MEDICINE

## 2017-08-23 PROCEDURE — 1159F MED LIST DOCD IN RCRD: CPT | Mod: S$GLB,,, | Performed by: INTERNAL MEDICINE

## 2017-08-23 PROCEDURE — 3008F BODY MASS INDEX DOCD: CPT | Mod: S$GLB,,, | Performed by: INTERNAL MEDICINE

## 2017-08-24 ENCOUNTER — OFFICE VISIT (OUTPATIENT)
Dept: INTERNAL MEDICINE | Facility: CLINIC | Age: 82
End: 2017-08-24
Payer: COMMERCIAL

## 2017-08-24 ENCOUNTER — PATIENT MESSAGE (OUTPATIENT)
Dept: INTERNAL MEDICINE | Facility: CLINIC | Age: 82
End: 2017-08-24

## 2017-08-24 ENCOUNTER — HOSPITAL ENCOUNTER (OUTPATIENT)
Facility: HOSPITAL | Age: 82
LOS: 1 days | Discharge: HOME | End: 2017-08-25
Attending: EMERGENCY MEDICINE | Admitting: INTERNAL MEDICINE
Payer: COMMERCIAL

## 2017-08-24 VITALS
RESPIRATION RATE: 18 BRPM | HEIGHT: 67 IN | TEMPERATURE: 98 F | WEIGHT: 145 LBS | HEART RATE: 77 BPM | OXYGEN SATURATION: 97 % | DIASTOLIC BLOOD PRESSURE: 42 MMHG | BODY MASS INDEX: 22.76 KG/M2 | SYSTOLIC BLOOD PRESSURE: 82 MMHG

## 2017-08-24 DIAGNOSIS — E55.9 VITAMIN D DEFICIENCY: ICD-10-CM

## 2017-08-24 DIAGNOSIS — G45.9 TIA (TRANSIENT ISCHEMIC ATTACK): ICD-10-CM

## 2017-08-24 DIAGNOSIS — Z86.73 OLD CEREBROVASCULAR ACCIDENT (CVA) WITHOUT LATE EFFECT: ICD-10-CM

## 2017-08-24 DIAGNOSIS — A41.9 SEPSIS DUE TO PNEUMONIA: Primary | ICD-10-CM

## 2017-08-24 DIAGNOSIS — E86.0 DEHYDRATION: ICD-10-CM

## 2017-08-24 DIAGNOSIS — N17.9 AKI (ACUTE KIDNEY INJURY): ICD-10-CM

## 2017-08-24 DIAGNOSIS — F05 VASCULAR DEMENTIA WITH DELIRIUM: ICD-10-CM

## 2017-08-24 DIAGNOSIS — R50.9 FEBRILE ILLNESS: Primary | ICD-10-CM

## 2017-08-24 DIAGNOSIS — R53.1 GENERALIZED WEAKNESS: ICD-10-CM

## 2017-08-24 DIAGNOSIS — I48.0 PAROXYSMAL A-FIB: ICD-10-CM

## 2017-08-24 DIAGNOSIS — E86.9 VOLUME DEPLETION: ICD-10-CM

## 2017-08-24 DIAGNOSIS — J40 BRONCHITIS: ICD-10-CM

## 2017-08-24 DIAGNOSIS — I10 ESSENTIAL HYPERTENSION: ICD-10-CM

## 2017-08-24 DIAGNOSIS — E87.20 LACTIC ACIDOSIS: ICD-10-CM

## 2017-08-24 DIAGNOSIS — R50.9 FEVER, UNSPECIFIED FEVER CAUSE: ICD-10-CM

## 2017-08-24 DIAGNOSIS — J18.9 SEPSIS DUE TO PNEUMONIA: Primary | ICD-10-CM

## 2017-08-24 DIAGNOSIS — F01.50 VASCULAR DEMENTIA WITH DELIRIUM: ICD-10-CM

## 2017-08-24 LAB
ABO + RH BLD: NORMAL
ALBUMIN SERPL BCP-MCNC: 3.2 G/DL
ALP SERPL-CCNC: 80 U/L
ALT SERPL W/O P-5'-P-CCNC: 13 U/L
ANION GAP SERPL CALC-SCNC: 9 MMOL/L
APTT BLDCRRT: 31.3 SEC
AST SERPL-CCNC: 19 U/L
BASOPHILS # BLD AUTO: 0.02 K/UL
BASOPHILS NFR BLD: 0.1 %
BILIRUB SERPL-MCNC: 1.1 MG/DL
BILIRUB SERPL-MCNC: ABNORMAL MG/DL
BILIRUB UR QL STRIP: NEGATIVE
BILIRUB UR QL STRIP: NEGATIVE
BLD GP AB SCN CELLS X3 SERPL QL: NORMAL
BLOOD URINE, POC: ABNORMAL
BNP SERPL-MCNC: 113 PG/ML
BUN SERPL-MCNC: 26 MG/DL
CALCIUM SERPL-MCNC: 9.5 MG/DL
CHLORIDE SERPL-SCNC: 101 MMOL/L
CLARITY UR REFRACT.AUTO: ABNORMAL
CLARITY UR: CLEAR
CO2 SERPL-SCNC: 24 MMOL/L
COLOR UR AUTO: YELLOW
COLOR UR: YELLOW
COLOR, POC UA: ABNORMAL
CORTIS SERPL-MCNC: 8.3 UG/DL
CREAT SERPL-MCNC: 1.2 MG/DL
DIFFERENTIAL METHOD: ABNORMAL
EOSINOPHIL # BLD AUTO: 0 K/UL
EOSINOPHIL NFR BLD: 0 %
ERYTHROCYTE [DISTWIDTH] IN BLOOD BY AUTOMATED COUNT: 13.9 %
EST. GFR  (AFRICAN AMERICAN): >60 ML/MIN/1.73 M^2
EST. GFR  (NON AFRICAN AMERICAN): 53 ML/MIN/1.73 M^2
ESTIMATED PA SYSTOLIC PRESSURE: 34.81
FLUAV AG SPEC QL IA: NEGATIVE
FLUBV AG SPEC QL IA: NEGATIVE
GLOBAL PERICARDIAL EFFUSION: NORMAL
GLUCOSE SERPL-MCNC: 198 MG/DL
GLUCOSE SERPL-MCNC: 219 MG/DL (ref 70–110)
GLUCOSE UR QL STRIP: 250
GLUCOSE UR QL STRIP: ABNORMAL
GLUCOSE UR QL STRIP: ABNORMAL
HCT VFR BLD AUTO: 42.9 %
HGB BLD-MCNC: 15.1 G/DL
HGB UR QL STRIP: NEGATIVE
HGB UR QL STRIP: NEGATIVE
INR PPP: 1.1
KETONES UR QL STRIP: ABNORMAL
LACTATE SERPL-SCNC: 1.4 MMOL/L
LACTATE SERPL-SCNC: 1.5 MMOL/L
LACTATE SERPL-SCNC: 2.7 MMOL/L
LEUKOCYTE ESTERASE UR QL STRIP: NEGATIVE
LEUKOCYTE ESTERASE UR QL STRIP: NEGATIVE
LEUKOCYTE ESTERASE URINE, POC: ABNORMAL
LIPASE SERPL-CCNC: 8 U/L
LYMPHOCYTES # BLD AUTO: 0.8 K/UL
LYMPHOCYTES NFR BLD: 4.7 %
MAGNESIUM SERPL-MCNC: 2.1 MG/DL
MCH RBC QN AUTO: 30.5 PG
MCHC RBC AUTO-ENTMCNC: 35.2 G/DL
MCV RBC AUTO: 87 FL
MICROSCOPIC COMMENT: NORMAL
MONOCYTES # BLD AUTO: 1.1 K/UL
MONOCYTES NFR BLD: 6.1 %
NEUTROPHILS # BLD AUTO: 15.4 K/UL
NEUTROPHILS NFR BLD: 88.8 %
NITRITE UR QL STRIP: NEGATIVE
NITRITE UR QL STRIP: NEGATIVE
NITRITE, POC UA: ABNORMAL
PH UR STRIP: 5 [PH] (ref 5–8)
PH UR STRIP: 6 [PH] (ref 5–8)
PH, POC UA: 5
PHOSPHATE SERPL-MCNC: 3.4 MG/DL
PLATELET # BLD AUTO: 196 K/UL
PMV BLD AUTO: 10.7 FL
POCT GLUCOSE: 140 MG/DL (ref 70–110)
POCT GLUCOSE: 182 MG/DL (ref 70–110)
POTASSIUM SERPL-SCNC: 4 MMOL/L
PROCALCITONIN SERPL IA-MCNC: 3.53 NG/ML
PROT SERPL-MCNC: 6.7 G/DL
PROT UR QL STRIP: ABNORMAL
PROT UR QL STRIP: NEGATIVE
PROTEIN, POC: ABNORMAL
PROTHROMBIN TIME: 11.8 SEC
RBC # BLD AUTO: 4.95 M/UL
RETIRED EF AND QEF - SEE NOTES: 65 (ref 55–65)
SODIUM SERPL-SCNC: 134 MMOL/L
SP GR UR STRIP: 1.02 (ref 1–1.03)
SP GR UR STRIP: 1.02 (ref 1–1.03)
SPECIFIC GRAVITY, POC UA: 1.02
SPECIMEN SOURCE: NORMAL
T4 FREE SERPL-MCNC: 0.96 NG/DL
TRICUSPID VALVE REGURGITATION: NORMAL
TROPONIN I SERPL DL<=0.01 NG/ML-MCNC: 0.02 NG/ML
TSH SERPL DL<=0.005 MIU/L-ACNC: 0.34 UIU/ML
URN SPEC COLLECT METH UR: ABNORMAL
URN SPEC COLLECT METH UR: ABNORMAL
UROBILINOGEN UR STRIP-ACNC: NEGATIVE EU/DL
UROBILINOGEN UR STRIP-ACNC: NEGATIVE EU/DL
UROBILINOGEN, POC UA: ABNORMAL
WBC # BLD AUTO: 17.32 K/UL
WBC #/AREA URNS AUTO: 1 /HPF (ref 0–5)

## 2017-08-24 PROCEDURE — 3008F BODY MASS INDEX DOCD: CPT | Mod: S$GLB,,, | Performed by: FAMILY MEDICINE

## 2017-08-24 PROCEDURE — 85610 PROTHROMBIN TIME: CPT

## 2017-08-24 PROCEDURE — 36415 COLL VENOUS BLD VENIPUNCTURE: CPT

## 2017-08-24 PROCEDURE — 83880 ASSAY OF NATRIURETIC PEPTIDE: CPT

## 2017-08-24 PROCEDURE — 85025 COMPLETE CBC W/AUTO DIFF WBC: CPT

## 2017-08-24 PROCEDURE — 93005 ELECTROCARDIOGRAM TRACING: CPT

## 2017-08-24 PROCEDURE — 82948 REAGENT STRIP/BLOOD GLUCOSE: CPT | Mod: S$GLB,,, | Performed by: FAMILY MEDICINE

## 2017-08-24 PROCEDURE — 82962 GLUCOSE BLOOD TEST: CPT

## 2017-08-24 PROCEDURE — 96361 HYDRATE IV INFUSION ADD-ON: CPT

## 2017-08-24 PROCEDURE — 87040 BLOOD CULTURE FOR BACTERIA: CPT

## 2017-08-24 PROCEDURE — 80053 COMPREHEN METABOLIC PANEL: CPT

## 2017-08-24 PROCEDURE — 82533 TOTAL CORTISOL: CPT

## 2017-08-24 PROCEDURE — 82607 VITAMIN B-12: CPT

## 2017-08-24 PROCEDURE — G0378 HOSPITAL OBSERVATION PER HR: HCPCS

## 2017-08-24 PROCEDURE — 25000003 PHARM REV CODE 250

## 2017-08-24 PROCEDURE — 99999 PR PBB SHADOW E&M-EST. PATIENT-LVL III: CPT | Mod: PBBFAC,,, | Performed by: FAMILY MEDICINE

## 2017-08-24 PROCEDURE — 85730 THROMBOPLASTIN TIME PARTIAL: CPT

## 2017-08-24 PROCEDURE — 63600175 PHARM REV CODE 636 W HCPCS: Performed by: EMERGENCY MEDICINE

## 2017-08-24 PROCEDURE — 86900 BLOOD TYPING SEROLOGIC ABO: CPT

## 2017-08-24 PROCEDURE — 84145 PROCALCITONIN (PCT): CPT

## 2017-08-24 PROCEDURE — 81003 URINALYSIS AUTO W/O SCOPE: CPT

## 2017-08-24 PROCEDURE — 81001 URINALYSIS AUTO W/SCOPE: CPT

## 2017-08-24 PROCEDURE — 99214 OFFICE O/P EST MOD 30 MIN: CPT | Mod: 25,S$GLB,, | Performed by: FAMILY MEDICINE

## 2017-08-24 PROCEDURE — 83605 ASSAY OF LACTIC ACID: CPT | Mod: 91

## 2017-08-24 PROCEDURE — 96365 THER/PROPH/DIAG IV INF INIT: CPT

## 2017-08-24 PROCEDURE — 83605 ASSAY OF LACTIC ACID: CPT

## 2017-08-24 PROCEDURE — S0077 INJECTION, CLINDAMYCIN PHOSP: HCPCS | Performed by: STUDENT IN AN ORGANIZED HEALTH CARE EDUCATION/TRAINING PROGRAM

## 2017-08-24 PROCEDURE — 83690 ASSAY OF LIPASE: CPT

## 2017-08-24 PROCEDURE — 1159F MED LIST DOCD IN RCRD: CPT | Mod: S$GLB,,, | Performed by: FAMILY MEDICINE

## 2017-08-24 PROCEDURE — 87086 URINE CULTURE/COLONY COUNT: CPT | Mod: 59

## 2017-08-24 PROCEDURE — 81002 URINALYSIS NONAUTO W/O SCOPE: CPT | Mod: S$GLB,,, | Performed by: FAMILY MEDICINE

## 2017-08-24 PROCEDURE — 87086 URINE CULTURE/COLONY COUNT: CPT

## 2017-08-24 PROCEDURE — 96367 TX/PROPH/DG ADDL SEQ IV INF: CPT

## 2017-08-24 PROCEDURE — 25000003 PHARM REV CODE 250: Performed by: STUDENT IN AN ORGANIZED HEALTH CARE EDUCATION/TRAINING PROGRAM

## 2017-08-24 PROCEDURE — 93306 TTE W/DOPPLER COMPLETE: CPT | Mod: 26,,, | Performed by: INTERNAL MEDICINE

## 2017-08-24 PROCEDURE — 93306 TTE W/DOPPLER COMPLETE: CPT

## 2017-08-24 PROCEDURE — 87400 INFLUENZA A/B EACH AG IA: CPT | Mod: 59,PO

## 2017-08-24 PROCEDURE — 1126F AMNT PAIN NOTED NONE PRSNT: CPT | Mod: S$GLB,,, | Performed by: FAMILY MEDICINE

## 2017-08-24 PROCEDURE — 84100 ASSAY OF PHOSPHORUS: CPT

## 2017-08-24 PROCEDURE — 83735 ASSAY OF MAGNESIUM: CPT

## 2017-08-24 PROCEDURE — 63600175 PHARM REV CODE 636 W HCPCS

## 2017-08-24 PROCEDURE — 86901 BLOOD TYPING SEROLOGIC RH(D): CPT

## 2017-08-24 PROCEDURE — 96366 THER/PROPH/DIAG IV INF ADDON: CPT

## 2017-08-24 PROCEDURE — 99285 EMERGENCY DEPT VISIT HI MDM: CPT | Mod: 25

## 2017-08-24 PROCEDURE — 84443 ASSAY THYROID STIM HORMONE: CPT

## 2017-08-24 PROCEDURE — 84439 ASSAY OF FREE THYROXINE: CPT

## 2017-08-24 PROCEDURE — 84484 ASSAY OF TROPONIN QUANT: CPT

## 2017-08-24 PROCEDURE — 25000003 PHARM REV CODE 250: Performed by: EMERGENCY MEDICINE

## 2017-08-24 RX ORDER — MEMANTINE HYDROCHLORIDE 5 MG/1
10 TABLET ORAL DAILY
Status: DISCONTINUED | OUTPATIENT
Start: 2017-08-25 | End: 2017-08-25 | Stop reason: HOSPADM

## 2017-08-24 RX ORDER — MOXIFLOXACIN HYDROCHLORIDE 400 MG/1
400 TABLET ORAL DAILY
Status: DISCONTINUED | OUTPATIENT
Start: 2017-08-25 | End: 2017-08-25 | Stop reason: HOSPADM

## 2017-08-24 RX ORDER — DABIGATRAN ETEXILATE 75 MG/1
75 CAPSULE ORAL DAILY
Status: DISCONTINUED | OUTPATIENT
Start: 2017-08-25 | End: 2017-08-24

## 2017-08-24 RX ORDER — GLUCAGON 1 MG
1 KIT INJECTION
Status: DISCONTINUED | OUTPATIENT
Start: 2017-08-24 | End: 2017-08-25 | Stop reason: HOSPADM

## 2017-08-24 RX ORDER — CLINDAMYCIN PHOSPHATE 600 MG/50ML
600 INJECTION, SOLUTION INTRAVENOUS ONCE
Status: COMPLETED | OUTPATIENT
Start: 2017-08-24 | End: 2017-08-24

## 2017-08-24 RX ORDER — HEPARIN SODIUM 5000 [USP'U]/ML
5000 INJECTION, SOLUTION INTRAVENOUS; SUBCUTANEOUS EVERY 8 HOURS
Status: DISCONTINUED | OUTPATIENT
Start: 2017-08-24 | End: 2017-08-25

## 2017-08-24 RX ORDER — SODIUM CHLORIDE 9 MG/ML
INJECTION, SOLUTION INTRAVENOUS CONTINUOUS
Status: DISCONTINUED | OUTPATIENT
Start: 2017-08-24 | End: 2017-08-25

## 2017-08-24 RX ORDER — IBUPROFEN 200 MG
16 TABLET ORAL
Status: DISCONTINUED | OUTPATIENT
Start: 2017-08-24 | End: 2017-08-25 | Stop reason: HOSPADM

## 2017-08-24 RX ORDER — INSULIN ASPART 100 [IU]/ML
0-5 INJECTION, SOLUTION INTRAVENOUS; SUBCUTANEOUS
Status: DISCONTINUED | OUTPATIENT
Start: 2017-08-24 | End: 2017-08-25 | Stop reason: HOSPADM

## 2017-08-24 RX ORDER — IBUPROFEN 200 MG
24 TABLET ORAL
Status: DISCONTINUED | OUTPATIENT
Start: 2017-08-24 | End: 2017-08-25 | Stop reason: HOSPADM

## 2017-08-24 RX ORDER — FAMOTIDINE 20 MG/1
20 TABLET, FILM COATED ORAL 2 TIMES DAILY
Status: DISCONTINUED | OUTPATIENT
Start: 2017-08-24 | End: 2017-08-25 | Stop reason: HOSPADM

## 2017-08-24 RX ADMIN — SODIUM CHLORIDE 2040 ML: 0.9 INJECTION, SOLUTION INTRAVENOUS at 11:08

## 2017-08-24 RX ADMIN — CEFTRIAXONE 2 G: 2 INJECTION, SOLUTION INTRAVENOUS at 01:08

## 2017-08-24 RX ADMIN — DRONEDARONE 400 MG: 400 TABLET, FILM COATED ORAL at 05:08

## 2017-08-24 RX ADMIN — HEPARIN SODIUM 5000 UNITS: 5000 INJECTION, SOLUTION INTRAVENOUS; SUBCUTANEOUS at 09:08

## 2017-08-24 RX ADMIN — SODIUM CHLORIDE: 0.9 INJECTION, SOLUTION INTRAVENOUS at 09:08

## 2017-08-24 RX ADMIN — FAMOTIDINE 20 MG: 20 TABLET ORAL at 09:08

## 2017-08-24 RX ADMIN — HEPARIN SODIUM 5000 UNITS: 5000 INJECTION, SOLUTION INTRAVENOUS; SUBCUTANEOUS at 05:08

## 2017-08-24 RX ADMIN — AZITHROMYCIN MONOHYDRATE 500 MG: 500 INJECTION, POWDER, LYOPHILIZED, FOR SOLUTION INTRAVENOUS at 02:08

## 2017-08-24 RX ADMIN — CLINDAMYCIN IN 5 PERCENT DEXTROSE 600 MG: 12 INJECTION, SOLUTION INTRAVENOUS at 06:08

## 2017-08-24 NOTE — PROGRESS NOTES
Subjective:       Patient ID: Sherri Bunch is a 89 y.o. male.    Chief Complaint: Fever and Fatigue    HPI 89-year-old male presents to clinic today accompanied by his daughter and son-in-law secondary to complaints of fever, decreased appetite, weakness, increased fatigue, and confusion worsening over the past 24 hours.  The daughter reports that yesterday upon returning home she was told that her father was extremely fatigued and unable to ambulate and she normally ambulates.  They noted a low-grade fever last night of approximately 99.8.  He was taken to urgent care and workup including rapid strep throat swab and chest x-ray returned negative.  They have been giving the patient Tylenol which has been controlling the fever; however, he continues to be extremely weak.  His appetite has been extremely decreased but they have been trying to get the patient to drink Ensure.    Review of Systems   Constitutional: Positive for appetite change, chills, fatigue and fever.   HENT: Negative for congestion, ear pain, hearing loss, postnasal drip, rhinorrhea, sinus pressure, sore throat and tinnitus.    Eyes: Negative for redness, itching and visual disturbance.   Respiratory: Negative for cough, chest tightness and shortness of breath.    Cardiovascular: Negative for chest pain and palpitations.   Gastrointestinal: Negative for abdominal pain, constipation, diarrhea, nausea and vomiting.   Genitourinary: Negative for decreased urine volume, difficulty urinating, dysuria, frequency, hematuria and urgency.   Musculoskeletal: Negative for back pain, myalgias, neck pain and neck stiffness.   Skin: Negative for rash.   Neurological: Positive for weakness. Negative for dizziness, light-headedness and headaches.   Psychiatric/Behavioral: Positive for confusion.       Objective:      Physical Exam   Constitutional: He is oriented to person, place, and time. He appears well-developed and well-nourished. He appears lethargic. He has a  sickly appearance. No distress.   HENT:   Head: Normocephalic and atraumatic.   Right Ear: External ear normal.   Left Ear: External ear normal.   Nose: Nose normal.   Mouth/Throat: Oropharynx is clear and moist. Mucous membranes are pale and dry. No oropharyngeal exudate.   Eyes: Conjunctivae and EOM are normal. Pupils are equal, round, and reactive to light. Right eye exhibits no discharge. Left eye exhibits no discharge. No scleral icterus.   Neck: Normal range of motion. Neck supple. No JVD present. No tracheal deviation present. No thyromegaly present.   Cardiovascular: Normal rate, regular rhythm, normal heart sounds and intact distal pulses.  Exam reveals no gallop and no friction rub.    No murmur heard.  Pulmonary/Chest: Effort normal and breath sounds normal. No stridor. No respiratory distress. He has no wheezes. He has no rales.   Abdominal: Soft. Bowel sounds are normal. He exhibits no distension and no mass. There is no tenderness. There is no rebound and no guarding.   Musculoskeletal: Normal range of motion. He exhibits no edema or tenderness.   Wheelchair-bound   Lymphadenopathy:     He has no cervical adenopathy.   Neurological: He is oriented to person, place, and time. He appears lethargic.   Skin: Skin is warm and dry. No rash noted. He is not diaphoretic. No erythema. No pallor.   Psychiatric: He has a normal mood and affect. His behavior is normal. Judgment and thought content normal.   Nursing note and vitals reviewed.      Results for orders placed or performed in visit on 08/24/17   POCT URINE DIPSTICK WITHOUT MICROSCOPE   Result Value Ref Range    Color, UA bárbara     Spec Grav UA 1.020     pH, UA 5     WBC, UA neg     Nitrite, UA neg     Protein trace     Glucose,      Ketones, UA small     Urobilinogen, UA neg     Bilirubin neg     Blood, UA neg    POCT Glucose   Result Value Ref Range    POC Glucose 219 (A) 70 - 110 mg/dL       Assessment:       1. Febrile illness        Plan:        Febrile illness  -     Influenza antigen Nasal Swab  -     Urinalysis  -     POCT URINE DIPSTICK WITHOUT MICROSCOPE  -     Urine culture  -     POCT Glucose      The patient is extremely dehydrated per physical exam and urine testing.  I have encouraged the family to bring the patient to the emergency room for further evaluation and IV hydration therapy.  Family agrees.  Report has been given.

## 2017-08-24 NOTE — ED PROVIDER NOTES
Encounter Date: 8/24/2017       History     Chief Complaint   Patient presents with    Fatigue     Generalized weakness.  Was sent to ED for further evaluation of possible dehydration.  Fever at home since yesterday.     This is an 88 y/o M with history of progressive weakness since yesterday afternoon.  Family reports decreased po intake and weakness with fever tmax 100.8, blood pressure was elevated yesterday 190/100 and low today in PCP office 80s/60s.  Patient has been lethargic and unable to get up and walk without assistance since yesterday afternoon.  No vomiting or diarrhea.  Decreased po intake with 4 ozs of ensure last night and same this morning.       The history is provided by the patient.     Review of patient's allergies indicates:  No Known Allergies  Past Medical History:   Diagnosis Date    Atrial fibrillation     Diabetes     Diabetes mellitus, type 2     Hyperlipidemia     Hypertension      History reviewed. No pertinent surgical history.  Family History   Problem Relation Age of Onset    Heart disease Sister     No Known Problems Daughter     Heart disease Brother     No Known Problems Daughter     Psoriasis Neg Hx     Lupus Neg Hx     Melanoma Neg Hx     Amblyopia Neg Hx     Blindness Neg Hx     Cataracts Neg Hx     Glaucoma Neg Hx     Macular degeneration Neg Hx     Retinal detachment Neg Hx     Strabismus Neg Hx      Social History   Substance Use Topics    Smoking status: Former Smoker     Packs/day: 0.15     Start date: 7/13/1960     Quit date: 7/13/1970    Smokeless tobacco: Never Used      Comment: Retired  in [place]    Alcohol use No     Review of Systems   Constitutional: Positive for activity change and appetite change. Negative for fever.   HENT: Negative for sore throat.    Respiratory: Positive for cough. Negative for shortness of breath.    Cardiovascular: Negative for chest pain.   Gastrointestinal: Negative for nausea and  vomiting.   Genitourinary: Negative for dysuria.   Musculoskeletal: Negative for back pain.   Skin: Negative for rash.   Neurological: Positive for weakness.   Hematological: Does not bruise/bleed easily.   All other systems reviewed and are negative.      Physical Exam     Initial Vitals [08/24/17 1004]   BP Pulse Resp Temp SpO2   (!) 111/56 76 16 98.4 °F (36.9 °C) 96 %      MAP       74.33         Physical Exam    Nursing note and vitals reviewed.  Constitutional: He appears well-developed and well-nourished.   Appears weak, dehydrated   HENT:   Head: Normocephalic and atraumatic.   Eyes: Conjunctivae and EOM are normal. Pupils are equal, round, and reactive to light.   Neck: Normal range of motion. Neck supple.   Cardiovascular: Normal rate, regular rhythm, normal heart sounds and intact distal pulses. Exam reveals no gallop and no friction rub.    No murmur heard.  Pulmonary/Chest: Breath sounds normal. No stridor. No respiratory distress. He has no wheezes. He has no rhonchi. He has no rales. He exhibits no tenderness.   Abdominal: Soft. Bowel sounds are normal. He exhibits no distension. There is no tenderness. There is no rebound and no guarding.   Musculoskeletal: Normal range of motion.   Neurological: He is alert and oriented to person, place, and time. He has normal strength. No cranial nerve deficit.   Skin: Skin is warm and dry.   Psychiatric: He has a normal mood and affect.         ED Course   Procedures  Labs Reviewed   B-TYPE NATRIURETIC PEPTIDE - Abnormal; Notable for the following:        Result Value     (*)     All other components within normal limits   CBC W/ AUTO DIFFERENTIAL - Abnormal; Notable for the following:     WBC 17.32 (*)     Gran # 15.4 (*)     Lymph # 0.8 (*)     Mono # 1.1 (*)     Gran% 88.8 (*)     Lymph% 4.7 (*)     All other components within normal limits   COMPREHENSIVE METABOLIC PANEL - Abnormal; Notable for the following:     Sodium 134 (*)     Glucose 198 (*)      BUN, Bld 26 (*)     Albumin 3.2 (*)     Total Bilirubin 1.1 (*)     eGFR if non  53 (*)     All other components within normal limits   TSH - Abnormal; Notable for the following:     TSH 0.335 (*)     All other components within normal limits   POCT GLUCOSE - Abnormal; Notable for the following:     POCT Glucose 182 (*)     All other components within normal limits   APTT   LACTIC ACID, PLASMA   LACTIC ACID, PLASMA   LIPASE   MAGNESIUM   PHOSPHORUS   PROTIME-INR   TROPONIN I   T4, FREE   TYPE & SCREEN        Imaging Results          US Carotid Bilateral (Final result)  Result time 08/25/17 16:15:29    Final result by Michael Coronado MD (08/25/17 16:15:29)                 Impression:          1. Less than 50% stenosis of the right internal carotid artery.  2. Less than 50% stenosis of the left internal carotid artery.    **Categories of stenosis (0-15%, 16-49%, 50-69% and 70-99% ) are based on published criteria that have been internally validated.  Degree of stenosis is based on NASCET criteria and refers to the degree of luminal diameter narrowing as a percentage of the normal ICA distal to the stenosis and any area of post stenotic dilation.        Electronically signed by: MICHAEL CORONADO  Date:     08/25/17  Time:    16:15              Narrative:    BILATERAL CAROTID DUPLEX ULTRASOUND.    Comparison: No comparison    FINDINGS:     Right common carotid:   Peak systolic velocity 79.5 cm/sec.    Right internal carotid artery:    Peak systolic velocity: 60.5 cm/sec.   IC/CC ratio:  0.76.    There is atherosclerotic plaque visualized in the right internal carotid artery.    Left common carotid:   Peak systolic velocity 94.4 cm/sec.    Left internal carotid artery:     Peak systolic velocity 105  cm/sec.    IC/CC ratio 1.1.    There is atherosclerotic plaque visualized in the left internal carotid artery.    Both vertebral arteries demonstrate antegrade flow.                             MRA Brain  without contrast (Final result)  Result time 08/24/17 15:40:36    Final result by Jacque Bocanegra MD (08/24/17 15:40:36)                 Impression:     The major intracranial vessels are patent without evidence of aneurysmal dilatation.      Electronically signed by: JACQUE BOCANEGRA MD  Date:     08/24/17  Time:    15:40              Narrative:    MRA brain:    The major intracranial vessels are patent without evidence aneurysmal dilatation.  The left vertebral artery is atretic.                             MRI Brain Without Contrast (Final result)  Result time 08/24/17 15:33:20    Final result by Jacque Bocanegra MD (08/24/17 15:33:20)                 Impression:     There is no evidence acute intracranial infarct.  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      Electronically signed by: JACQUE BOCANEGRA MD  Date:     08/24/17  Time:    15:33              Narrative:    MRI brain    There are no regions of restricted diffusion.  There is no extra-axial fluid collection, midline shift, mass or mass effect.  There is cerebral atrophy with proportional ventricular dilatation.  Flow-voids are seen in the region of the major intracranial vessels.  There are diffuse periventricular white matter changes.  Many images are obscured by patient motion.                             CT Head Without Contrast (Final result)     Abnormal  Result time 08/24/17 11:23:30    Final result by Jacque Bocanegra MD (08/24/17 11:23:30)                 Impression:     There is a large age-indeterminate right frontal ischemic infarct.    This report has been flagged as abnormal in EPIC.       Electronically signed by: JACQUE BOCANEGRA MD  Date:     08/24/17  Time:    11:23              Narrative:    CT head without contrast    History: Altered mental status    Comparison:     Technique: Contiguous axial images were acquired from vertex to skull base without contrast.    Findings: There is a wedge-shaped low attenuation defect  involving the right frontal lobe.  There is no evidence acute intracranial hemorrhage.  There is increased extra-axial space on the right as compared to left possibly due to hygroma.                             X-Ray Chest AP Portable (Final result)  Result time 08/24/17 11:54:16    Final result by Albino Gill MD (08/24/17 11:54:16)                 Impression:     No interval change.      Electronically signed by: ALBINO GILL MD  Date:     08/24/17  Time:    11:54              Narrative:    Portable AP chest x-ray    Comparison: 8/23/17    Findings: There is prominence of perihilar interstitial markings. There is no consolidation, effusion, or pneumothorax.  Cardiomediastinal silhouette is unremarkable.                                 Medical Decision Making:   History:   I obtained history from: someone other than patient.       <> Summary of History: Family provided history  Old Medical Records: I decided to obtain old medical records.  Old Records Summarized: records from clinic visits.       <> Summary of Records: PCP visit earlier in the week   Differential Diagnosis:   Electrolyte abnormality, hypoglycemia, CVA, spinal cord abnormality, infectious causes, Guillain [place], neuromuscular junction disease, muscle disease, endocrine abnormalities, sepsis.    Clinical Tests:   Lab Tests: Ordered and Reviewed  The following lab test(s) were unremarkable: CBC, CMP, Lactate and Urinalysis  Radiological Study: Ordered and Reviewed  Medical Tests: Ordered and Reviewed  ED Management:  I discussed the pts presentation and workup with the [facility] who accepts for admission.                      ED Course     Clinical Impression:   The primary encounter diagnosis was Dehydration. Diagnoses of Bronchitis, Fever, unspecified fever cause, Generalized weakness, and TIA (transient ischemic attack) were also pertinent to this visit.    Disposition:   Disposition: Admitted  Condition: Stable                         Inga Madison MD  09/11/17 [id number]

## 2017-08-24 NOTE — MEDICAL/APP STUDENT
L3 Med Student Note H&P    CC: general weakness x 1 day     HPI: Mr. Polanco, who does not speak English, is an 89 year old  American male with a past medical history of hypertension, atrial fibrillation rhythm controlled with dronedarone and anticoagulated with pradaxa, controlled diabetes type 2 (HbA1c 7.1 8/12/17), hyperlipidemia, and mild cognitive decline. Mr. Polanco lives with his daughter and son-in-law and was in his normal state of health (baseline dementia, possible alzheimer's, with significant memory impairment, ambulates normally) until yesterday morning when they noticed his gait was unstable, he appeared very weak, and later became febrile. They brought him to an urgent care clinic for his fever for which a rapid strep throat swab and chest x-ray was performed; both were negative. He was then brought to his primary care physician, Dr. Ozzy Jerome, this morning who recommended visit to the ED for extreme dehydration.       PMHx:    Hypertension Controlled     Paroxysmal Atrial fibrillation Rhythm controlled + anticoaculant    Diabetes Mellitus Type 2 Controlled, HbA1c 7.1    Hyperlipidemia     Mild cognitive decline      Allergies:   N/A       SHx:   N/A      Screening/Immunizations:   Pneumovax     Influenza     Herpes Zoster Up to date          FHx:   Cardiovascular disease brother    Cardiovascular disease sister     SoHx:    Cigarrettes, former smoker 2 pack years    Tobacco  n/a    Alcohol  none     Meds:    B-complex vitamins     Calcium carbonate/Vit. D3 PO x 1/day    Dronedarone 400 mg PO x1/day    Fish Oil-Omega 3 300-1000 mg PO x1/day    Hydrochlorothiazide 12.5 mg PO x1/day    Hydrocortisone suppository 25 mg MT    Probiotics (lactobacillus acidophilus)     Lycopene PO    Memantine 10 mg PO x1/day    Metoprolol succinate 25 mg PO x1/day    Multivitamin  PO x1/day    Dabigatran 75 mg PO x1/day    Ranitidine 300 mg PO x1/day    Saw Palmetto      Neomycin-polymyxin-hydrocortisone Topical BID nails     RoS:   Full review of systems was difficult because of patient's mentation and language barrier. However, family did endorse that patient has difficulty swallowing especially while eating at times.     Physical Exam:    Value Min Max   Temp 97 °F (36.1 °C) 98.4 °F (36.9 °C)   Pulse 64 118   Resp 16 20   BP: Systolic 82 136   BP: Diastolic 42 60   SpO2 95 % 98 %     General: Mr. Polanco appears comfortable and pleasant; marked improvement since being seen in the ED.   Neuro: Patient is not alert and oriented to self. Time and place not assessed.  HEENT: scalp is symmetric without abnormal hair or skin findings. Head is normocephalic and atraumatic. Neck/throat is midline with no signs of lymphadenopathy.  Cv: normal S1/S2 with regular rate and rhythm. No rubs, murmurs, or abnormal heart sounds are appreciated.  Respiratory: mild crackles auscultated bilaterally.  Extremities: 3/5 strength in extremities with intact sensation.  Skin: skin color, texture, and turgor normal. Consistent with patient's age.    Labs:  CBC/Differential   WBC 17.32    Hb 15.1    HCT 42.9    MCV 87    Plts 196    Neutrophils 88.8    Lymphocytes 4.7    Monocytes 6.1    Eosinophils 0.0    Basophils 0.1     CMP   Na 134    K 4.0    Cl 101    HCO3 24    BUN 26    Cr 1.2    Ca 9.5    Albumin 3.2    Total Bili 1.1    AST 19    AlkPhos 80    ALT 13     Magnesium   Mg 2.1     Phosphorus   PO4 3.4     Lactic Acid   Lactic acid 1.4     Coagulation Studies   Prothrombin time 11.8    INR 1.1     Troponin   Troponin I 0.021     Thyroid Studies      TSH 0.335    T4 0.96     Influenza Nasal Swab, negative    Imaging:  CT w/o Contrast, Head: wedge-shaped low attenuation defect involving the right frontal lobe. No evidence of acute hemorrhage; there is increased extra-axial space on the right as compared to left possibly due to hygroma.   MRI w/o Contrast, Head: no  extra-axial fluid collection, midline shift, mass or mass effect. Cerebral atrophy with proportional ventricular dilatation. Flow-voids are seen in the region of major intracranial vessels. There are diffuse periventricular white matter changes.   MRA w/o Contrast, Head: major intracranial vessels are patent without evidence of aneurysmal dilatation. Left vertebral artery is atretic.   Chest Xray: bilateral hazy interstitial opacities with prominence in hilar regions. No evidence of focal consolidation or effusions.     Assessment:  Mr. Bunch is an 89 year old -American male with a past medical history of HTN, Dm2 controlled with diet, paroxysmal atrial fibrillation rhythm controlled and anticoagulated, hyperlipidemia, and mild cognitive decline who presents to the ED with generalized weakness.    Plan:  Generalized Weakness with fever  -patient presented to the ED with weakness, unstable gait, confusion, and fever x 1 day duration  -fever of 100.8 yesterday with no chills or vomiting (per family)  -possible bilateral lower extremity weakness  -mentation/cognition difficult to assess given patient's baseline and language barrier  -weakness 2/2 dehydration vs. acute CVA vs. Aspiration pneumonia vs. UTI vs. Meningoencephalitis  -physical exam shows no focal neural deficits   -Febrile PTA with WBC of 17.32 with neutrophil 88.8%; however, patient does take steroid suppository  -CT w/o contrast showed low attenuation defect in right frontal lobe confirmed to be chronic; MRI w/o contrast showed cortical atrophy and diffuse periventricular white matter changes. Flow voids also seen on MRI in major vessels.; MRA w/o contrast showed no major pathology; chest xray shows no signs of consolidation  -Urinalysis shows no signs of infection  -influenza nasal swab is negative  -lactic acid is normal; procalcitonin pending  -c-spine echo scheduled  -given azithromycin, ceftriaxone, and clindamycin for empiric CAP  treatment    Hypertension  -held HCTZ and metoprolol succinate for now due to low blood pressure (112/60 @ 12:13)  -hypotension possible 2/2 to dehydration  -will re-evaluate after fluids and monitor blood pressure    Diabetes Type 2, controlled w/ diet (HbA1c - 7.1)  -will continue monitoring POCT glucose    Paroxysmal Atrial Fibrillation  -EKG in ED shows normal sinus rhythm  -held dabigatran for possible CVA  -continuing droneradone    Hyperlipidemia  -lipid panel on 8/12 shows total cholesterol of 164; triglycerides of 182 and HDL of 39  -possible treatment with niacin or statin, but questionable given patient's age    Mild Cognitive Decline  -mental status/cognition was difficult to assess given language barrier  -will continue memantine and monitor for decline    Ppx: Heparin 5k units SC Q8H  Dispo: patient appeared much better at bedside after receiving fluids in the Ed; pending c-spine echo    Kennedy Stanton L3

## 2017-08-24 NOTE — PROGRESS NOTES
"Subjective:       Patient ID: Sherri Bunch is a 89 y.o. male.    Vitals:  height is 5' 7" (1.702 m) and weight is 65.8 kg (145 lb). His temperature is 97 °F (36.1 °C). His blood pressure is 122/58 (abnormal) and his pulse is 90. His respiration is 18 and oxygen saturation is 95%.     Chief Complaint: Fever    Fever       Fever    This is a new problem. The problem occurs constantly. The problem has been unchanged. The maximum temperature noted was 99 to 99.9 F. The temperature was taken using an oral thermometer. Associated symptoms include congestion, coughing and wheezing. Pertinent negatives include no abdominal pain, chest pain, ear pain, headaches, nausea or sore throat. He has tried acetaminophen for the symptoms. The treatment provided mild relief.     Review of Systems   Constitution: Positive for chills, fever, malaise/fatigue and night sweats.   HENT: Positive for congestion. Negative for ear pain, headaches, hoarse voice and sore throat.    Eyes: Negative for discharge and redness.   Cardiovascular: Negative for chest pain, dyspnea on exertion and leg swelling.   Respiratory: Positive for cough and wheezing. Negative for shortness of breath and sputum production.    Musculoskeletal: Negative for myalgias.   Gastrointestinal: Negative for abdominal pain and nausea.       Objective:      Physical Exam   Constitutional: He appears well-developed and well-nourished.   Ill appearing   HENT:   Mouth/Throat: Posterior oropharyngeal edema and posterior oropharyngeal erythema present.   Neck: Normal range of motion. Neck supple.   Pulmonary/Chest: Effort normal.   Decreased BS both basese;upper airway congestion       Assessment:       1. Fever, unspecified fever cause    2. Weakness        Plan:           "

## 2017-08-24 NOTE — H&P
"[facility]   History and Physical  Intern Note    Admitting Team: [facility] Internal Medicine Team A  Attending Physician: Иван  Resident: Afshan  Interns: Esme    Date of Admit: 8/24/2017    Chief Complaint     Weakness for 1 day    Subjective:      History of Present Illness:  Sherri Bunch is a 89 y.o.  male with a PMHx of paroxysmal Atrial Fibrillation, DM2 (last A1C of 7 on 1/17), unclear hx of alzheimer's (pt on memantine) and HTN. The patient presented to [facility] on 8/24/2017 with a primary complaint of weakness and fatigue for 1 day.    The patient was in their usual state of health until yesterday morning when son-in-law and daughter noticed pt was having difficulty walking which he can usually do on his own. The onset was sudden, the day prior pt was walking fine. Family noted that pt was globally fatigued but it was most prominent in the bilateral lower extremities. Pt also endorsed decreased PO intake and some increased confusion, though at pt's baseline there is mild confusion and impaired memory. Pt has a chronic cough which family states has been unchanged throughout recent developments. They also took his temperature that night, which read at 100.8. They have been giving tylenol in the meanwhile which has lowered his temperature since. When they first noticed his fatigue, they took his BP at home and noted it to be 180s systolic. They then took him to urgent care where a rapid strep and xray where taken which both were negative. The next day, they saw Dr. Jerome (PCP) in clinic who recommended ED visit if symptoms did not resolve, per ED BP was measured at 80/40s in PCP office. Also at PCP, urine studies, flu swab ordered, all negative. Pt is compliant with all his medications including his pradaxa.     After chart review, pt has previously seen Dr. Sandhu (neurology) for memory loss and gait instability. Per his note "He had a CT Scan of the brain " "that shows evidence of multiple small ischemic lesions in the right frontoparietal and bilateral basal ganglia.  This is most likely related to the history of atrial fibrillation."      Past Medical History:  Past Medical History:   Diagnosis Date    Atrial fibrillation     Diabetes     Diabetes mellitus, type 2     Hyperlipidemia     Hypertension        Past Surgical History:  History reviewed. No pertinent surgical history.    Allergies:  Review of patient's allergies indicates:  No Known Allergies    Home Medications:  Prior to Admission medications    Medication Sig Start Date End Date Taking? Authorizing Provider   b complex vitamins capsule Take 1 capsule by mouth once daily.    Historical Provider, MD   CALCIUM CARBONATE/VITAMIN D3 (CALCIUM 600 WITH VITAMIN D3 ORAL) Take 1 tablet by mouth once daily.    Historical Provider, MD   dronedarone (MULTAQ) 400 mg Tab Take 400 mg by mouth 2 (two) times daily with meals.    Historical Provider, MD   fish oil-omega-3 fatty acids 300-1,000 mg capsule Take 1 g by mouth once daily.    Historical Provider, MD   hydrochlorothiazide (MICROZIDE) 12.5 mg capsule  3/30/16   Historical Provider, MD   hydrocortisone (ANUSOL-HC) 25 mg suppository UNWRAP AND PLACE 1 SUPPOSITORY (25 MG TOTAL) RECTALLY 2 (TWO) TIMES DAILY AS NEEDED FOR HEMORRHOIDS. 5/25/17   Historical Provider, MD   Lactobacillus acidophilus (PROBIOTIC) 10 billion cell Cap Take by mouth once daily.    Historical Provider, MD   LYCOPENE ORAL Take 5 mg by mouth once daily.    Historical Provider, MD   memantine (NAMENDA) 10 MG Tab Take 1 tablet (10 mg total) by mouth once daily. 8/8/17 8/8/18  Surendra Sandhu MD   metoprolol succinate (TOPROL-XL) 25 MG 24 hr tablet  4/6/16   Historical Provider, MD   multivitamin (ONE DAILY MULTIVITAMIN) per tablet Take 1 tablet by mouth once daily.    Historical Provider, MD   neomycin-polymyxin-hydrocortisone (CORTISPORIN) 3.5-10,000-1 mg/mL-unit/mL-% otic suspension Use " "bid for nails 17   Ozzy Jerome MD   PRADAXA 75 mg Cap  9/29/15   Historical Provider, MD   ranitidine (ZANTAC) 300 MG tablet Take 1 tablet (300 mg total) by mouth every evening. 17  Ozzy Jerome MD   saw pal-pumpkin [id number] (SAW PALMETTO COMPLEX,PUMPK-ZN,) 320-40-10-15 mg Cap Take 1 capsule by mouth once daily.    Historical Provider, MD       Family History:  Family History   Problem Relation Age of Onset    Heart disease Sister     No Known Problems Daughter     Heart disease Brother     No Known Problems Daughter        Social History:  Social History   Substance Use Topics    Smoking status: Former Smoker     Packs/day: 0.15     Start date: 1960     Quit date: 1970    Smokeless tobacco: Never Used      Comment: Retired  in [place]    Alcohol use No       Review of Systems:  Pertinent items are noted in HPI. All other systems are reviewed and are negative.    Health Maintaince :   Primary Care Physician: Dr. Jerome  Immunizations:   TDap is up to date.  Influenza is up to date.  Pneumovax is up to date.  Cancer Screening:  Colonoscopy: is not up to date. Pt declined, with age will not pursue     Objective:   Last 24 Hour Vital Signs:  BP  Min: 82/42  Max: 136/60  Temp  Av °F (36.7 °C)  Min: 97 °F (36.1 °C)  Max: 98.4 °F (36.9 °C)  Pulse  Av.7  Min: 64  Max: 118  Resp  Av.3  Min: 16  Max: 20  SpO2  Av.5 %  Min: 95 %  Max: 98 %  Height  Av' 7.3" (170.9 cm)  Min: 5' 7" (170.2 cm)  Max: 5' 8" (172.7 cm)  Weight  Av.5 kg (146 lb 10.7 oz)  Min: 65.8 kg (145 lb)  Max: 68 kg (150 lb)  Body mass index is 22.81 kg/m².  No intake/output data recorded.    Physical Examination:  General appearance: alert, appears stated age and distracted  Head: Normocephalic, without obvious abnormality, atraumatic  Eyes: conjunctivae/corneas clear. PERRL, EOM's intact. Fundi benign.  Neck: no adenopathy, no carotid bruit, no " JVD, supple, symmetrical, trachea midline and thyroid not enlarged, symmetric, no tenderness/mass/nodules  Lungs: Crackles heard bilaterally throughout  Heart: regular rate and rhythm, S1, S2 normal, no murmur, click, rub or gallop  Abdomen: soft, non-tender; bowel sounds normal; no masses,  no organomegaly  Extremities: extremities normal, atraumatic, no cyanosis or edema  Pulses: 2+ and symmetric  Skin: Skin color, texture, turgor normal. No rashes or lesions  Neurologic: Examination difficult secondary to language barrier and pts suspected cognitive impairment. Full motor strength noted on exam. PERRL, EOMI. CN V, VII, X, XI, XII in tact. Sensation intact throughout. Gait unable to be assessed.     Laboratory:  Most Recent Data:  CBC: Lab Results   Component Value Date    WBC 17.32 (H) 08/24/2017    HGB 15.1 08/24/2017    HCT 42.9 08/24/2017     08/24/2017    MCV 87 08/24/2017    RDW 13.9 08/24/2017     WBC Differential: 88 % N, 0 % Bands, 4.7 % L, 6.1 % M, 0 % Eo, 0.1 % Baso    BMP: Lab Results   Component Value Date     (L) 08/24/2017    K 4.0 08/24/2017     08/24/2017    CO2 24 08/24/2017    BUN 26 (H) 08/24/2017    CREATININE 1.2 08/24/2017     (H) 08/24/2017    CALCIUM 9.5 08/24/2017    MG 2.1 08/24/2017    PHOS 3.4 08/24/2017     LFTs: Lab Results   Component Value Date    PROT 6.7 08/24/2017    ALBUMIN 3.2 (L) 08/24/2017    BILITOT 1.1 (H) 08/24/2017    AST 19 08/24/2017    ALKPHOS 80 08/24/2017    ALT 13 08/24/2017     Coags:   Lab Results   Component Value Date    INR 1.1 08/24/2017     FLP: Lab Results   Component Value Date    CHOL 164 08/12/2017    HDL 39 (L) 08/12/2017    LDLCALC 88.6 08/12/2017    TRIG 182 (H) 08/12/2017    CHOLHDL 23.8 08/12/2017     DM: Lab Results   Component Value Date    HGBA1C 7.1 (H) 08/12/2017    HGBA1C 7.0 (H) 01/07/2017    LDLCALC 88.6 08/12/2017    CREATININE 1.2 08/24/2017     Thyroid: Lab Results   Component Value Date    TSH 0.335 (L)  08/24/2017    FREET4 0.96 08/24/2017     Anemia: Lab Results   Component Value Date    HDTOAALJ91 2000 (H) 01/13/2016     Cardiac: Lab Results   Component Value Date    TROPONINI 0.021 08/24/2017     (H) 08/24/2017     Urinalysis: Lab Results   Component Value Date    COLORU Yellow 08/24/2017    SPECGRAV 1.025 08/24/2017    NITRITE Negative 08/24/2017    PROTEINUR trace 08/24/2017    KETONESU Trace (A) 08/24/2017    UROBILINOGEN Negative 08/24/2017    BILIRUBINUR neg 08/24/2017    WBCUA 1 08/24/2017     Trended Lab Data:    Recent Labs  Lab 08/24/17  1104   WBC 17.32*   HGB 15.1   HCT 42.9      MCV 87   RDW 13.9   *   K 4.0      CO2 24   BUN 26*   CREATININE 1.2   *   PROT 6.7   ALBUMIN 3.2*   BILITOT 1.1*   AST 19   ALKPHOS 80   ALT 13       Trended Cardiac Data:    Recent Labs  Lab 08/24/17  1104   TROPONINI 0.021   *     Other Results:  EKG (my interpretation): normal EKG, normal sinus rhythm.    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    This report has been flagged as abnormal in EPIC.       Electronically signed by: JACQUE BOCANEGRA MD  Date:     08/24/17  Time:    11:23              Narrative:    CT head without contrast    History: Altered mental status    Comparison:     Technique: Contiguous axial images were acquired from vertex to skull base without contrast.    Findings: There is a wedge-shaped low attenuation defect involving the right frontal lobe.  There is no evidence acute intracranial hemorrhage.  There is increased extra-axial space on the right as compared to left possibly due to hygroma.                             X-Ray Chest AP Portable (Final result)  Result time 08/24/17 11:54:16    Final result by Damian Gill MD  (08/24/17 11:54:16)                 Impression:     No interval change.      Electronically signed by: ALBINO GUSTAFSON MD  Date:     08/24/17  Time:    11:54              Narrative:    Portable AP chest x-ray    Comparison: 8/23/17    Findings: There is prominence of perihilar interstitial markings. There is no consolidation, effusion, or pneumothorax.  Cardiomediastinal silhouette is unremarkable.                               Assessment:     Sherri Bunch is a 89 y.o. male with a PMHx of paroxysmal Atrial Fibrillation, DM2 (last A1C of 7 on 1/17) and HTN. presenting at [facility] with generalized weakness.     Plan:     Generalized Weakness  - 1 day, sudden onset generalized weakness. Elevate T at home of 100.8. BPs measured at PCP of 80s systolic.   - On exam, pt exhibiting no weakness. Legs with full strength.   - WBCs 17.8 w/ left shift. Initial lactate 1.5. UA pending.   - CXR w/ increased interstitial markings, no focal opacity. Head CT w/ age-indeterminate frontal ischemic infarct. EKG w/o active Afib.  - Currently with wide differential including stroke, bronchitis, CAP, viral etiology, urosepsis.  - NIHSS - 1 (secondary to language barrier), ABCD2 - 2 (age, DM2)  - Ordered MRI/A head/neck. Echo.   - Ordered Procal, blood and urine cx, f/u lactate  - Ordered PT/OT/ST w/ bedside swallow  - Received Rocephin and Azithro and 2L NS in the ED, given Moxi IV shortage will cover with clindamycin 600 IV tonight and switch to PO moxifloxacin once pt passes swallow study  - Holding metoprolol and pradaxa in setting of possible large territory infarct, will cont dronedarone.    Paroxysmal Afib  - CHADSVASC - 3  - pt not currently in afib, on pradaxa + metoprolol + dronedarone  - Will order echo  - continue dronedarone, hold pradaxa and metoprolol    HTN  - BP well controlled currently, allow permissive hypertension  - Hold metoprolol     DM2  - last A1C was 7.0, per endocrine note no interventions necessary given pts  age  - Monitor w/ daily BMP fasting glucose    Memory Loss  - pt taking memantine, will continue  - has seen Dr. Sandhu    Diet: NPO until bedside swallow completed  PPX: Heparin  Dispo: Pending stroke workup, getting carotid US in the morning, plan to discharge if all negatve and PT/OT/ST without recs    Code Status:     Full    Pepe Victoria MD  [facility] Internal Medicine HO-I  [facility] Internal Medicine Service Team A    [facility] Medicine Hospitalist Pager numbers:   [facility] Hospitalist Medicine Team A (Rosanne/Иван): [phone]  [facility] Hospitalist Medicine Team B (Juancarlos/Natanael):  [phone]

## 2017-08-24 NOTE — ED NOTES
Pt here with children. Daughter states pt became weak yesterday. He has good appetite and chronically does not drink a lot of fluids. Yesterday went to urgent care-negative strep and chest xray. Daughter states he has history of confusion and Afib and Diabetes. resp are regular and unlabored. Skin WDL. No edema present. Pt is alert, nonverbal with nurse. abd is soft and nontender. instructed daughter on urine collection.

## 2017-08-25 VITALS
TEMPERATURE: 98 F | RESPIRATION RATE: 18 BRPM | HEART RATE: 71 BPM | DIASTOLIC BLOOD PRESSURE: 64 MMHG | OXYGEN SATURATION: 97 % | SYSTOLIC BLOOD PRESSURE: 133 MMHG | HEIGHT: 68 IN | WEIGHT: 149.94 LBS | BODY MASS INDEX: 22.72 KG/M2

## 2017-08-25 PROBLEM — J18.9 SEPSIS DUE TO PNEUMONIA: Status: ACTIVE | Noted: 2017-08-25

## 2017-08-25 PROBLEM — J18.9 CAP (COMMUNITY ACQUIRED PNEUMONIA): Status: ACTIVE | Noted: 2017-08-25

## 2017-08-25 PROBLEM — E86.9 VOLUME DEPLETION: Status: ACTIVE | Noted: 2017-08-25

## 2017-08-25 PROBLEM — E55.9 VITAMIN D DEFICIENCY: Status: ACTIVE | Noted: 2017-08-25

## 2017-08-25 PROBLEM — N17.9 AKI (ACUTE KIDNEY INJURY): Status: ACTIVE | Noted: 2017-08-25

## 2017-08-25 PROBLEM — A41.9 SEPSIS: Status: ACTIVE | Noted: 2017-08-25

## 2017-08-25 PROBLEM — E86.0 DEHYDRATION: Status: RESOLVED | Noted: 2017-08-24 | Resolved: 2017-08-25

## 2017-08-25 PROBLEM — E87.20 LACTIC ACIDOSIS: Status: ACTIVE | Noted: 2017-08-25

## 2017-08-25 PROBLEM — A41.9 SEPSIS DUE TO PNEUMONIA: Status: ACTIVE | Noted: 2017-08-25

## 2017-08-25 LAB
ALBUMIN SERPL BCP-MCNC: 2.7 G/DL
ALP SERPL-CCNC: 71 U/L
ALT SERPL W/O P-5'-P-CCNC: 13 U/L
ANION GAP SERPL CALC-SCNC: 8 MMOL/L
AST SERPL-CCNC: 22 U/L
BACTERIA UR CULT: NORMAL
BASOPHILS # BLD AUTO: 0.03 K/UL
BASOPHILS NFR BLD: 0.3 %
BILIRUB SERPL-MCNC: 0.6 MG/DL
BUN SERPL-MCNC: 21 MG/DL
CALCIUM SERPL-MCNC: 8.1 MG/DL
CHLORIDE SERPL-SCNC: 105 MMOL/L
CO2 SERPL-SCNC: 23 MMOL/L
CREAT SERPL-MCNC: 0.9 MG/DL
DIFFERENTIAL METHOD: ABNORMAL
EOSINOPHIL # BLD AUTO: 0 K/UL
EOSINOPHIL NFR BLD: 0.1 %
ERYTHROCYTE [DISTWIDTH] IN BLOOD BY AUTOMATED COUNT: 14.4 %
EST. GFR  (AFRICAN AMERICAN): >60 ML/MIN/1.73 M^2
EST. GFR  (NON AFRICAN AMERICAN): >60 ML/MIN/1.73 M^2
GLUCOSE SERPL-MCNC: 97 MG/DL
HCT VFR BLD AUTO: 40.7 %
HGB BLD-MCNC: 13.4 G/DL
LACTATE SERPL-SCNC: 0.9 MMOL/L
LYMPHOCYTES # BLD AUTO: 0.8 K/UL
LYMPHOCYTES NFR BLD: 7.6 %
MCH RBC QN AUTO: 29.1 PG
MCHC RBC AUTO-ENTMCNC: 32.9 G/DL
MCV RBC AUTO: 88 FL
MONOCYTES # BLD AUTO: 0.8 K/UL
MONOCYTES NFR BLD: 7.4 %
NEUTROPHILS # BLD AUTO: 8.9 K/UL
NEUTROPHILS NFR BLD: 84.3 %
PLATELET # BLD AUTO: 183 K/UL
PMV BLD AUTO: 11.2 FL
POCT GLUCOSE: 129 MG/DL (ref 70–110)
POCT GLUCOSE: 93 MG/DL (ref 70–110)
POCT GLUCOSE: 98 MG/DL (ref 70–110)
POTASSIUM SERPL-SCNC: 3.8 MMOL/L
PROT SERPL-MCNC: 5.6 G/DL
RBC # BLD AUTO: 4.61 M/UL
SODIUM SERPL-SCNC: 136 MMOL/L
VIT B12 SERPL-MCNC: 1010 PG/ML
WBC # BLD AUTO: 10.59 K/UL

## 2017-08-25 PROCEDURE — G0378 HOSPITAL OBSERVATION PER HR: HCPCS

## 2017-08-25 PROCEDURE — 92610 EVALUATE SWALLOWING FUNCTION: CPT

## 2017-08-25 PROCEDURE — 25000003 PHARM REV CODE 250

## 2017-08-25 PROCEDURE — 36415 COLL VENOUS BLD VENIPUNCTURE: CPT

## 2017-08-25 PROCEDURE — G8997 SWALLOW GOAL STATUS: HCPCS | Mod: CH

## 2017-08-25 PROCEDURE — 25000003 PHARM REV CODE 250: Performed by: STUDENT IN AN ORGANIZED HEALTH CARE EDUCATION/TRAINING PROGRAM

## 2017-08-25 PROCEDURE — 83605 ASSAY OF LACTIC ACID: CPT

## 2017-08-25 PROCEDURE — G8996 SWALLOW CURRENT STATUS: HCPCS | Mod: CH

## 2017-08-25 PROCEDURE — 80053 COMPREHEN METABOLIC PANEL: CPT

## 2017-08-25 PROCEDURE — 85025 COMPLETE CBC W/AUTO DIFF WBC: CPT

## 2017-08-25 PROCEDURE — G8998 SWALLOW D/C STATUS: HCPCS | Mod: CH

## 2017-08-25 PROCEDURE — 63600175 PHARM REV CODE 636 W HCPCS

## 2017-08-25 PROCEDURE — 94761 N-INVAS EAR/PLS OXIMETRY MLT: CPT

## 2017-08-25 RX ORDER — MOXIFLOXACIN HYDROCHLORIDE 400 MG/1
400 TABLET ORAL DAILY
Qty: 10 TABLET | Refills: 0 | Status: SHIPPED | OUTPATIENT
Start: 2017-08-25 | End: 2017-11-08 | Stop reason: ALTCHOICE

## 2017-08-25 RX ORDER — DABIGATRAN ETEXILATE 75 MG/1
75 CAPSULE ORAL DAILY
Status: DISCONTINUED | OUTPATIENT
Start: 2017-08-25 | End: 2017-08-25 | Stop reason: HOSPADM

## 2017-08-25 RX ORDER — DABIGATRAN ETEXILATE 75 MG/1
75 CAPSULE ORAL DAILY
Status: DISCONTINUED | OUTPATIENT
Start: 2017-08-25 | End: 2017-08-25

## 2017-08-25 RX ORDER — METOPROLOL SUCCINATE 25 MG/1
25 TABLET, EXTENDED RELEASE ORAL DAILY
Status: DISCONTINUED | OUTPATIENT
Start: 2017-08-25 | End: 2017-08-25 | Stop reason: HOSPADM

## 2017-08-25 RX ADMIN — THERA TABS 1 TABLET: TAB at 09:08

## 2017-08-25 RX ADMIN — HEPARIN SODIUM 5000 UNITS: 5000 INJECTION, SOLUTION INTRAVENOUS; SUBCUTANEOUS at 05:08

## 2017-08-25 RX ADMIN — MOXIFLOXACIN HYDROCHLORIDE 400 MG: 400 TABLET, FILM COATED ORAL at 09:08

## 2017-08-25 RX ADMIN — FAMOTIDINE 20 MG: 20 TABLET ORAL at 09:08

## 2017-08-25 RX ADMIN — DRONEDARONE 400 MG: 400 TABLET, FILM COATED ORAL at 09:08

## 2017-08-25 RX ADMIN — MEMANTINE HYDROCHLORIDE 10 MG: 5 TABLET ORAL at 09:08

## 2017-08-25 NOTE — PLAN OF CARE
Problem: SLP Goal  Goal: SLP Goal  Outcome: Outcome(s) achieved Date Met: 08/25/17  Javad nguyen completed this am with pt and family member in room. No audible s/s of oral or pharyngeal dysphagia present. Continue regular diet and thin liquids at dc.   No further ST needs.

## 2017-08-25 NOTE — PLAN OF CARE
Transport came to  patient for ultra sound and patient refused to go. Spoke to patient's daughter on the phone and she asked if this could be done as an outpatient. Paged doctor, waiting for a response.

## 2017-08-25 NOTE — PLAN OF CARE
Future Appointments  Date Time Provider Department Center   8/31/2017 10:00 AM Gardenia Ji MD [facility] IM [facility]   9/29/2017 2:15 PM Elvin Del Castillo OD [facility] OPT[facility] [place]        08/25/17 1638   Final Note   Assessment Type Final Discharge Note   Discharge Disposition Home   Hospital Follow Up  Appt(s) scheduled? Yes   Discharge plans and expectations educations in teach back method with documentation complete? Yes   Right Care Referral Info   Post Acute Recommendation No Care     Mely Bales, RN, CCM, CMSRN  RN Transition Navigator  [phone]

## 2017-08-25 NOTE — MEDICAL/APP STUDENT
\[facility]\"" Medical Student L3 Note - SOAP    Subjective:  Mr. Bunch appeared to be feeling better and was sitting in the chair on his own accord. His son-in-law was with him in the room and was able to give updates. He said there has been a marked improvement in Mr. Bunch's activity and has been walking around on his own with a steady gait.     Objective  Vitals  Value Min Max   Temp 97.8 °F (36.6 °C) 99.2 °F (37.3 °C)   Pulse 64 118   Resp 16 20   BP: Systolic 82 139   BP: Diastolic 42 68   MAP (mmHg) 75 90   SpO2 94 % 98 %        Physical Exam  General: Mr. Bunch appeared significantly more comfortable and less ill compared to the ED.  Neuro: Mr. Bunch's baseline mentation/cognition is difficult to assess, but he does appear to have dementia.    HEENT: scalp is symmetric without abnormal hair or skin findings. Pupils are equally round and reactive to light. Head is normocephalic and atraumatic. Neck is midline and without apparent lymphadenopathy. Oral mucosa appears normal.  CV: S1/S2 with regular rate and rhythm. No rubs, murmurs, or abnormal heart sounds could be appreciated.  Respiratory: lungs were clear to ausculation bilaterally.  Skin: skin color, texture, and turgor were normal. No rash or lesions could be seen. Consistent with patient's age.    Labs:  CBC/Differential   WBC 10.59    Hb 13.4    HCT 40.7    MCV 88    Plts 183    Neutrophils 84.3    Lymphocytes 7.6    Monocytes 7.4    Eosinophils 0.1    Basophils 0.3     CMP   Na 136    K 3.8    Cl 105    HCO3 23    BUN 21    Cr 0.9    Ca 8.1 (*8.9 corrected for albumin)    Albumin 2.7    Total Bilirubin 0.6    Alk. Phos 71    AST 22    ALT 13     Imaging:  Carotid echo scheduled    Assessment:  Mr. Bunch is an 89 year old -American male with a past medical history of HTN, Dm2 controlled with diet, paroxysmal atrial fibrillation rhythm controlled and anticoagulated, hyperlipidemia, and mild cognitive decline who presents to the ED with generalized  weakness.    Plan:  Generalized Weakness with fever likely 2/2 to Dehydration  -patient presented to the ED with weakness, unstable gait, confusion, and fever x 1 day duration  -fever of 100.8 yesterday with no chills or vomiting (per family)  -possible bilateral lower extremity weakness  -mentation/cognition difficult to assess given patient's baseline and language barrier  -weakness 2/2 dehydration vs. acute CVA vs. Aspiration pneumonia vs. UTI vs. Meningoencephalitis  -physical exam shows no focal neural deficits   -WBC 17.32 -->10.59; H/H 15.1/42.9 --> 13.4/40/7; initially increased levels may be due to dehydration; given approximately 1L NS since admission.  -CT w/o contrast showed low attenuation defect in right frontal lobe confirmed to be chronic; MRI w/o contrast showed cortical atrophy and diffuse periventricular white matter changes. Flow voids also seen on MRI in major vessels.; MRA w/o contrast showed no major pathology; chest xray shows no signs of consolidation  -Urinalysis shows no signs of infection  -influenza nasal swab is negative  -lactic acid is 0.9; procalcitonin 3.5  -c-spine echo scheduled  -given azithromycin, ceftriaxone, and clindamycin for empiric CAP treatment, will continue and d/c with PO antibiotics.     Hypertension  -held HCTZ and metoprolol succinate for now due to low blood pressure  -hypotension possible 2/2 to dehydration  -will re-evaluate after fluids and monitor blood pressure (136/61 @ 8:24)     Diabetes Type 2, controlled w/ diet (HbA1c - 7.1)  -will continue monitoring POCT glucose     Paroxysmal Atrial Fibrillation  -EKG in ED shows normal sinus rhythm  -held dabigatran for possible CVA  -continuing droneradone; will continue dabigatran      Hyperlipidemia  -lipid panel on 8/12 shows total cholesterol of 164; triglycerides of 182 and HDL of 39  -possible treatment with niacin or statin, but questionable given patient's age     Mild Cognitive Decline  -mental  status/cognition was difficult to assess given language barrier  -will continue memantine and monitor for decline     Ppx: Heparin 5k units SC Q8H  Dispo: likely d/c today pending scheduled imaging; will d/c on home antibiotics.    Kennedy Stanton L3

## 2017-08-25 NOTE — PROGRESS NOTES
Physical Therapy      Sherri Bunch  MRN: [id number]    Patient not seen today secondary to pt being EBONY in ultrasound. Will follow-up at a later time.    Zaira Sales, PT   8/25/2017

## 2017-08-25 NOTE — PLAN OF CARE
Problem: Patient Care Overview  Goal: Plan of Care Review  Outcome: Ongoing (interventions implemented as appropriate)  Pt on RA sats  94%.

## 2017-08-25 NOTE — PLAN OF CARE
Problem: Patient Care Overview  Goal: Plan of Care Review  Outcome: Ongoing (interventions implemented as appropriate)  No c/o pain.  IVF  X 1 L given.  Son - in - law bedside throughout the night.  Pt continued to pull heart monitor off.  NSR on the monitor with hr 80- 90's.  Bed alarm on for safety.  Will continue to monitor.

## 2017-08-25 NOTE — PLAN OF CARE
Patient refusing carotid ultrasound. Discussed importance of test, however patient does not want test to be done today. Discussed with family; will order ultrasound as outpatient. Placed PT/OT consult to eval for deconditioning and if any DME needed. Will plan for discharge pending PT/OT.

## 2017-08-25 NOTE — PLAN OF CARE
Went over discharge instructions with the daughter, verbalized understanding. Removed IV , tip intact. Tele returned to nurses station.

## 2017-08-25 NOTE — PROGRESS NOTES
"[facility] IM Resident [facility]ORheaI Progress Note    Subjective:      Sherri Bunch is a 89 y.o.  male who is being followed by the [facility] IM service at [facility] for CVA rule out.    Pt is doing very well this morning. Per son-in-law he appears at his baseline, is able to eat full meals, and is drinking water/ He is able to ambulate about his room without any deficit. He believes this was all secondary to dehydration.      Objective:   Last 24 Hour Vital Signs:  BP  Min: 82/42  Max: 139/63  Temp  Av.2 °F (36.8 °C)  Min: 97.8 °F (36.6 °C)  Max: 99.2 °F (37.3 °C)  Pulse  Av.9  Min: 64  Max: 118  Resp  Av.9  Min: 16  Max: 20  SpO2  Av.4 %  Min: 95 %  Max: 98 %  Height  Av' 7.5" (171.5 cm)  Min: 5' 7" (170.2 cm)  Max: 5' 8" (172.7 cm)  Weight  Av.9 kg (147 lb 8 oz)  Min: 65.8 kg (145 lb)  Max: 68 kg (150 lb)  No intake/output data recorded.    Physical Examination:  General appearance: alert, appears stated age and distracted  Head: Normocephalic, without obvious abnormality, atraumatic  Eyes: conjunctivae/corneas clear. PERRL, EOM's intact. Fundi benign.  Neck: no adenopathy, no carotid bruit, no JVD, supple, symmetrical, trachea midline and thyroid not enlarged, symmetric, no tenderness/mass/nodules  Lungs: Crackles heard bilaterally throughout  Heart: regular rate and rhythm, S1, S2 normal, no murmur, click, rub or gallop  Abdomen: soft, non-tender; bowel sounds normal; no masses,  no organomegaly  Extremities: extremities normal, atraumatic, no cyanosis or edema  Pulses: 2+ and symmetric  Skin: Skin color, texture, turgor normal. No rashes or lesions  Neurologic: Examination difficult secondary to language barrier and pts suspected cognitive impairment. Full motor strength noted on exam. PERRL, EOMI. CN V, VII, X, XI, XII in tact. Sensation intact throughout. Gait unable to be assessed.     Laboratory:  Laboratory Data Reviewed: yes  Pertinent Findings:  WBC down to 10.5    Procal  > " 3    Microbiology Data Reviewed: yes  Pertinent Findings:  Blood Cx - NGTD    Urine Cx - pending    Other Results:  EKG (my interpretation): no new study.    Radiology Data Reviewed: yes  Pertinent Findings:  MRI/A head    There is no evidence acute intracranial infarct.  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    The major intracranial vessels are patent without evidence of aneurysmal dilatation.    US Carotids - pending    Current Medications:     Infusions:        Scheduled:   dronedarone  400 mg Oral BID WM    famotidine  20 mg Oral BID    heparin (porcine)  5,000 Units Subcutaneous Q8H    memantine  10 mg Oral Daily    moxifloxacin  400 mg Oral Daily    multivitamin  1 tablet Oral Daily        PRN:  dextrose 50%, dextrose 50%, glucagon (human recombinant), glucose, glucose, insulin aspart    Antibiotics and Day Number of Therapy:  Clinda, Rocephin, Azithro in the Ed, Moxi started today    Lines and Day Number of Therapy:  PIV    Assessment:     Sherri Bunch is a 89 y.o.male with  Patient Active Problem List    Diagnosis Date Noted    Generalized weakness 08/24/2017    Dehydration 08/24/2017    Onychomycosis of multiple toenails with type 2 diabetes mellitus and peripheral neuropathy 08/21/2017    Osteopenia 01/13/2017    Essential hypertension 08/18/2016    DM (diabetes mellitus), type 2 with neurological complications     Hyperlipidemia     MCI (mild cognitive impairment) with memory loss 09/01/2015    Paroxysmal atrial fibrillation         Plan:     Sepsis secondary to possible interstitial pneumonia  - 1 day, sudden onset generalized weakness. Elevate T at home of 100.8. BPs measured at PCP of 80s systolic.   - On exam, pt exhibiting no weakness. Legs with full strength.   - WBCs 17.8 w/ left shift. Initial lactate 1.5. UA pending.   - CXR w/ increased interstitial markings, no focal opacity. Head CT w/ age-indeterminate frontal ischemic infarct. EKG w/o active Afib.  - Initially  with wide differential including stroke, bronchitis, CAP, viral etiology, urosepsis.  - NIHSS - 1 (secondary to language barrier), ABCD2 - 2 (age, DM2)  - MRI/A head - study limited secondary to patient motion, infarct noted on L frontal on CT was chronic  - Echo - normal  - Procal > 3, lactate - wnl, blood and urine cx pending   - Ordered PT/OT/ST w/ bedside swallow  - Received Rocephin and Azithro and 2L NS in the ED, given Moxi IV shortage will cover with clindamycin 600 IV tonight and switch to PO moxifloxacin, started moxi on HD2. Plan to DC on Moxi x 5-10 days based on ST aspiration risk.  - Initially held metoprolol and pradaxa in setting of possible large territory infarct, will cont dronedarone. Restarted Pradaxa on day 2.      Paroxysmal Afib  - CHADSVASC - 3  - pt not currently in afib, on pradaxa + metoprolol + dronedarone  - Echo - wnl  - continue dronedarone, pradaxa and metoprolol on day 2     HTN  - BP well controlled currently, allow permissive hypertension  - Initially held metoprolol, restarted on day 2     DM2  - last A1C was 7.0, per endocrine note no interventions necessary given pts age  - Monitor w/ daily BMP fasting glucose     Memory Loss  - pt taking memantine, will continue  - has seen Dr. Sandhu     Dispo: Pending stroke workup, getting carotid US today, plan to discharge if all negative and PT/OT/ST without recs    Pepe Victoria  [facility] Internal Medicine HO-I  [facility] IM Service Team A    [facility] Medicine Hospitalist Pager numbers:   [facility] Hospitalist Medicine Team A (Rosanne/Иван): [phone]  [facility] Hospitalist Medicine Team B (Juancarlos/Natanael):  [phone]

## 2017-08-25 NOTE — PT/OT/SLP EVAL
Speech Language Pathology  Clinical Swallow Evaluation/Discharge    Sherri Bunch   MRN: [id number]   Admitting Diagnosis: <principal problem not specified>    Diet recommendations: Solid Diet Level: Regular  Liquid Diet Level: Thin   Universal swallow precautions, upright for meals, straws ok, whole meds    SLP Treatment Date: 08/25/17  Speech Start Time: 0938     Speech Stop Time: 0950     Speech Total (min): 12 min       TREATMENT BILLABLE MINUTES:  Eval Swallow and Oral Function 12    Diagnosis: <principal problem not specified>  Sherri Bunch is a 89 y.o.  male with a PMHx of paroxysmal Atrial Fibrillation, DM2 (last A1C of 7 on 1/17), unclear hx of alzheimer's (pt on memantine) and HTN. The patient presented to [facility] on 8/24/2017 with a primary complaint of weakness and fatigue for 1 day.     The patient was in their usual state of health until yesterday morning when son-in-law and daughter noticed pt was having difficulty walking which he can usually do on his own. The onset was sudden, the day prior pt was walking fine. Family noted that pt was globally fatigued but it was most prominent in the bilateral lower extremities. Pt also endorsed decreased PO intake and some increased confusion, though at pt's baseline there is mild confusion and impaired memory. Pt has a chronic cough which family states has been unchanged throughout recent developments. They also took his temperature that night, which read at 100.8. They have been giving tylenol in the meanwhile which has lowered his temperature since. When they first noticed his fatigue, they took his BP at home and noted it to be 180s systolic. They then took him to urgent care where a rapid strep and xray where taken which both were negative. The next day, they saw Dr. Jerome (PCP) in clinic who recommended ED visit if symptoms did not resolve, per ED BP was measured at 80/40s in PCP office. Also at PCP, urine studies, flu swab ordered, all  "negative. Pt is compliant with all his medications including his pradaxa.      After chart review, pt has previously seen Dr. Sandhu (neurology) for memory loss and gait instability. Per his note "He had a CT Scan of the brain that shows evidence of multiple small ischemic lesions in the right frontoparietal and bilateral basal ganglia.  This is most likely related to the history of atrial fibrillation."      Past Medical History:   Diagnosis Date    Atrial fibrillation     Diabetes     Diabetes mellitus, type 2     Hyperlipidemia     Hypertension      History reviewed. No pertinent surgical history.    Has the patient been evaluated by SLP for swallowing? : Yes  Keep patient NPO?: No   General Precautions: Standard, fall          Social Hx: Patient lives with daughter at home.     Prior diet: pt was eating regular textures, drinking boost at home.    Subjective:  Consult for swallow eval received, family member in room.   Patient goals: none stated per patient, son in law reported, "he ate good this morning."    Pain/Comfort  Pain Rating 1: 0/10    Objective:   Patient found with: telemetry  Pt found walking around room prior to SLP eval.   SLP did communicate with RN prior to eval. Son in law in room. Pt also dressed up in his clothes and awaiting discharge.     Pt's primary language is Mandarin chinese,  services were offered free of charge to patient and family member to communicate with SLP. Son in law reported that he could translate for pt who is familiar to him. Family member declined  services at this time.     Oral Musculature Evaluation  Oral Musculature: WFL  Dentition: scattered dentition, teeth in poor condition  Mucosal Quality: adequate  Mandibular Strength and Mobility:  (adequate)  Oral Labial Strength and Mobility:  (fair)  Lingual Strength and Mobility:  (fair and adequate)  Volitional Cough: elicited  Volitional Swallow: timely palpation  Voice Prior to PO Intake: " "clear voice     Bedside Swallow Eval:  Consistencies Assessed: Thin liquids water self fed, Puree pudding by tsp  and Solids cracker self fed 1/2" square  Oral Phase: WNL  Pharyngeal Phase: no overt clinical  signs/symptoms of aspiration and no overt clinical signs/symptoms of pharyngeal dysphagia    Additional Treatment:  No further ST needs    FIM:  Social Interaction: 3 Moderate Direction--The patient interacts appropriately 50 to 74% of the time.         Comprehension: 3 (language barrier, mandarin) Moderate Prompting--The patient understands directions and conversation about basid daily needs 50 to 74% of the time. (language barrier, mandarin)                Assessment:  Sherri Bunch is a 89 y.o. male with a medical diagnosis of <principal problem not specified> and presents with no audible s/s of oral or pharyngeal dysphagia at this time.    Do you have any cultural, spiritual, Nondenominational conflicts, given your current situation?: none     Discharge recommendations: Discharge Facility/Level Of Care Needs:  (home with family )     Goals:    SLP Goals     Not on file          Multidisciplinary Problems (Resolved)        Problem: SLP Goal    Goal Priority Disciplines Outcome   SLP Goal   (Resolved)     SLP Outcome(s) achieved                    Plan:   Patient to be seen     Plan of Care expires:    Plan of Care reviewed with: patient, other (see comments) (son in law)  SLP Follow-up?: No         SLP G-Codes  Functional Assessment Tool Used: NOMS  Score: 7  Functional Limitations: Swallowing  Swallow Current Status ():   Swallow Goal Status ():   Swallow Discharge Status ():     CICI Mota, CCC-SLP  08/25/2017            "
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
## 2017-08-25 NOTE — PLAN OF CARE
Did assessment on patient. Went over plan of care. Bed in low position, call light in reach.Saftey measure taken.  Patient's son in law in room and able to translate.

## 2017-08-25 NOTE — PLAN OF CARE
Pt speaks Mandarin Chinese. Pt's son in law in room who speaks English. TN informed him  available via language line free of charge and he requested to use  for discharge instructions but would translate for discharge assessment. Pt lives with family is is normally independent and has no discharge needs.    BRAVO Bales assigned to pt, informed of above, and will follow.       08/25/17 1053   Discharge Assessment   Assessment Type Discharge Planning Assessment   Confirmed/corrected address and phone number on facesheet? Yes   Assessment information obtained from? Caregiver  (son in law- Sal German)   Expected Length of Stay (days) 1   Communicated expected length of stay with patient/caregiver yes   Prior to hospitilization cognitive status: (hx: dementia)   Prior to hospitalization functional status: Independent   Current cognitive status: (hx dementia)   Current Functional Status: Independent   Lives With spouse;child(sandra), adult;other relative(s)  (spouse, daughter , son in law)   Able to Return to Prior Arrangements yes   Is patient able to care for self after discharge? Yes   Who are your caregiver(s) and their phone number(s)? daughter Mary [phone]   Patient's perception of discharge disposition home or selfcare   Readmission Within The Last 30 Days no previous admission in last 30 days   Patient currently being followed by outpatient case management? No   Patient currently receives any other outside agency services? No   Equipment Currently Used at Home none   Do you have any problems affording any of your prescribed medications? No   Is the patient taking medications as prescribed? yes   Does the patient have transportation home? Yes   Transportation Available family or friend will provide   Does the patient receive services at the [facility]madin Clinic? No   Discharge Plan A Home with family   Discharge Plan B Home Health;Home with family   Patient/Family In Agreement With Plan yes

## 2017-08-25 NOTE — PT/OT/SLP PROGRESS
Occupational Therapy      Sherri Bunch  MRN: [id number]    Patient not seen today secondary to EBONY x 37 min in ultrasound. Before orders received, pt noted to be ambulating in room and hallway this AM. Per nsg as well, pt has been ambulatory in hallway  . Will follow-up tomorrow's date if pt to remain in hospital.    Ivanna Cardona, OT  8/25/2017

## 2017-08-27 NOTE — PT/OT/SLP PROGRESS
Occupational Therapy      Sherri Bunch  MRN: [id number]    Patient not seen today secondary to d/c from hospital prior to evaluation performed  ..    Ivanna Cardona OT  8/27/2017

## 2017-08-28 NOTE — DISCHARGE SUMMARY
[facility] Internal Medicine Discharge Summary    Primary Team: [facility] Internal Medicine Team A  Attending Physician: Dr. Oates  Resident: Afshan  Intern: Esme    Date of Admit: 8/24/2017  Date of Discharge: 8/25/2017    Discharge to: Home  Condition: Good    Discharge Diagnoses     Patient Active Problem List   Diagnosis    Paroxysmal a-fib    DM (diabetes mellitus), type 2 with neurological complications    HTN (hypertension)    Osteopenia    Onychomycosis of multiple toenails with type 2 diabetes mellitus and peripheral neuropathy    Generalized weakness    Volume depletion    Vitamin D deficiency    Sepsis    CAP (community acquired pneumonia)    LEDY (acute kidney injury)    Lactic acidosis    Sepsis due to pneumonia    Old cerebrovascular accident (CVA) without late effect    Vascular dementia with delirium       Consultants and Procedures     Consultants:  none    Procedures:   MRI/A head  There is no evidence acute intracranial infarct.  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     The major intracranial vessels are patent without evidence of aneurysmal dilatation.    Brief History of Present Illness      Sherri Bunch is a 89 y.o. [place] male who  has a past medical history of Atrial fibrillation; Diabetes; Diabetes mellitus, type 2; Hyperlipidemia; and Hypertension.  The patient presented to [facility] on 8/24/2017 with a primary complaint of Fatigue (Generalized weakness.  Was sent to ED for further evaluation of possible dehydration.  Fever at home since yesterday.)    The patient was in their usual state of health until 1 day prior to admit when son-in-law and daughter noticed pt was having difficulty walking which he can usually do on his own. The onset was sudden, the day prior pt was walking fine. Family noted that pt was globally fatigued but it was most prominent in the bilateral lower extremities. Pt also endorsed decreased PO intake and some increased  "confusion, though at pt's baseline there is mild confusion and impaired memory. Pt has a chronic cough which family states has been unchanged throughout recent developments. They also took his temperature that night, which read at 100.8. They then took him to urgent care where a rapid strep and xray where taken which both were negative. The next day, they saw Dr. Jerome (PCP) in clinic who recommended ED visit if symptoms did not resolve, per ED BP was measured at 80/40s in PCP office. Also at PCP, urine studies, flu swab ordered, all negative.     After chart review, pt has previously seen Dr. Sandhu (neurology) for memory loss and gait instability. Per his note "He had a CT Scan of the brain that shows evidence of multiple small ischemic lesions in the right frontoparietal and bilateral basal ganglia.  This is most likely related to the history of atrial fibrillation."      Hospital Course By Problem with Pertinent Findings     Generalized Weakness, likely 2/2 dehydration  - 1 day, sudden onset generalized weakness. Elevate T at home of 100.8. BPs measured at PCP of 80s systolic.   - On initial exam, pt exhibited no weakness. Legs with full strength. Pt notable improved with administration of fluids.   - WBCs 17.8 w/ left shift. Initial lactate 1.5. UA neg.  - CXR w/ increased interstitial markings, no focal opacity. Head CT w/ age-indeterminate frontal ischemic infarct. EKG w/o active Afib.  - Initially wide differential including stroke, bronchitis, CAP, viral etiology, urosepsis, dehydration.  - NIHSS - 1 (secondary to language barrier), ABCD2 - 2 (age, DM2)  - MRI/A head/neck - positive for chronic vascular changes and age related changes. Echo performed and was negative.   - Procal negative, f/u lactate negative.  - Ordered PT/OT/ST w/ bedside swallow. Bedside swallow negative for aspiration, ST without needs. PT/OT refused by pt and family 2/2 agitation, will attempt outpatient.  - Received Rocephin and " William and 2L NS in the ED, given Moxi IV shortage covered initially with clindamycin 600 IV tonight and switch to PO moxifloxacin once pt passes swallow study. Discharged on Moxi x 5 days total.  - Held metoprolol and pradaxa in setting of possible large territory infarct, will cont dronedarone. Restarted all medications on discharge.  - Pt refused carotid US so cannot exclude carotid stenosis at this time, pt family wished to discharge pt with outpatient follow up.       Paroxysmal Afib  - CHADSVASC - 3  - pt not currently in afib, on pradaxa + metoprolol + dronedarone  - Echo wnl on this admit  - continue dronedarone, held pradaxa and metoprolol. Restart all meds on discharge.      HTN  - BP well controlled currently, allow permissive hypertension  - Held metoprolol, restart on discharge      DM2  - last A1C was 7.0, per endocrine note no interventions necessary given pts age  - Monitor w/ daily BMP fasting glucose     Memory Loss   throughout stay pt exhibited dementia behavior. Head imagining positive for chronic vascular changes, likely vascular dementia.  - pt taking memantine, will continue  - has seen Dr. Sandhu, scheduled follow up      Discharge Medications        Medication List      START taking these medications    moxifloxacin 400 mg tablet  Commonly known as:  AVELOX  Take 1 tablet (400 mg total) by mouth once daily.        CONTINUE taking these medications    CALCIUM 600 WITH VITAMIN D3 ORAL     dronedarone 400 mg Tab  Commonly known as:  MULTAQ     fish oil-omega-3 fatty acids 300-1,000 mg capsule     hydrochlorothiazide 12.5 mg capsule  Commonly known as:  MICROZIDE     hydrocortisone 25 mg suppository  Commonly known as:  ANUSOL-HC     LYCOPENE ORAL     memantine 10 MG Tab  Commonly known as:  NAMENDA  Take 1 tablet (10 mg total) by mouth once daily.     metoprolol succinate 25 MG 24 hr tablet  Commonly known as:  TOPROL-XL     neomycin-polymyxin-hydrocortisone 3.5-10,000-1 mg/mL-unit/mL-% otic  suspension  Commonly known as:  CORTISPORIN  Use bid for nails     ONE DAILY MULTIVITAMIN per tablet  Generic drug:  multivitamin     PRADAXA 75 mg Cap  Generic drug:  dabigatran etexilate     PROBIOTIC 10 billion cell Cap  Generic drug:  Lactobacillus acidophilus     ranitidine 300 MG tablet  Commonly known as:  ZANTAC  Take 1 tablet (300 mg total) by mouth every evening.        STOP taking these medications    b complex vitamins capsule     SAW PALMETTO COMPLEX(PUMPK-ZN) 320-40-10-15 mg Cap  Generic drug:  saw pal-pumpkin [id number]           Where to Get Your Medications      These medications were sent to [facility]/pharmacy #[id number] - [place] [address]  [address] [place] [address]    Phone:  [phone]   · moxifloxacin 400 mg tablet         Discharge Information:   Diet:  Diabetic    Physical Activity:  As tolerated    Instructions:  1. Take all medications as prescribed  2. Keep all follow-up appointments  3. Return to the hospital or call your primary care physicians if any worsening symptoms such as worsening weakness occur.      Follow-Up Appointments:  9/11/2017 1440 Dr. [facility]hu [facility]    8/31/2017 10:00 AM Gardenia Ji MD [facility] A[facility]   9/29/2017 2:15 PM Elvin Del Castillo OD [facility] [place]  [facility] Internal Medicine, [facility]

## 2017-08-29 LAB
BACTERIA BLD CULT: NORMAL
BACTERIA BLD CULT: NORMAL

## 2017-08-31 ENCOUNTER — OFFICE VISIT (OUTPATIENT)
Dept: PRIMARY CARE CLINIC | Facility: CLINIC | Age: 82
End: 2017-08-31
Payer: COMMERCIAL

## 2017-08-31 VITALS
BODY MASS INDEX: 23.1 KG/M2 | TEMPERATURE: 97 F | DIASTOLIC BLOOD PRESSURE: 62 MMHG | SYSTOLIC BLOOD PRESSURE: 103 MMHG | HEART RATE: 71 BPM | WEIGHT: 151.88 LBS

## 2017-08-31 DIAGNOSIS — J18.9 CAP (COMMUNITY ACQUIRED PNEUMONIA): Primary | ICD-10-CM

## 2017-08-31 DIAGNOSIS — I10 ESSENTIAL HYPERTENSION: ICD-10-CM

## 2017-08-31 DIAGNOSIS — I48.0 PAROXYSMAL A-FIB: ICD-10-CM

## 2017-08-31 DIAGNOSIS — F01.50 VASCULAR DEMENTIA WITHOUT BEHAVIORAL DISTURBANCE: ICD-10-CM

## 2017-08-31 PROCEDURE — 99999 PR PBB SHADOW E&M-EST. PATIENT-LVL III: CPT | Mod: PBBFAC,,, | Performed by: INTERNAL MEDICINE

## 2017-08-31 PROCEDURE — 3008F BODY MASS INDEX DOCD: CPT | Mod: S$GLB,,, | Performed by: INTERNAL MEDICINE

## 2017-08-31 PROCEDURE — 99215 OFFICE O/P EST HI 40 MIN: CPT | Mod: S$GLB,,, | Performed by: INTERNAL MEDICINE

## 2017-08-31 PROCEDURE — 1159F MED LIST DOCD IN RCRD: CPT | Mod: S$GLB,,, | Performed by: INTERNAL MEDICINE

## 2017-08-31 PROCEDURE — 1126F AMNT PAIN NOTED NONE PRSNT: CPT | Mod: S$GLB,,, | Performed by: INTERNAL MEDICINE

## 2017-08-31 NOTE — PROGRESS NOTES
[facility]  New Visit Progress Note   Recent Hospital Discharge     PRESENTING HISTORY     Chief Complaint/Reason for Admission:  Follow up Hospital Discharge   No chief complaint on file.    PCP: Ozzy Jerome MD    History of Present Illness:  Mr. Sherri Bunch is a 89 y.o. male who was recently admitted to the hospital.      ___________________________________________________________________    Today:  Presents to [facility] for hospital FU.  Recently hospitalized for AMS and dehydration which was likely secondary to a viral illness or atypical PNA.  Patient had fever, leukocytosis and a non specific, abnormal chest X Ray.   Pan cultures revealed no growth.   He responded well to empiric ABX, hydration, and supportive care.   He was discharged to complete a course of empiric Avelox.    Accompanied today by his daughter.  Patient was seen with the assistance of a professional ; the patients daughter also acted as a .  The patients functional status has returned to baseline. He is ambulating without assistance and conducting ADL's independently.  Daughter is concerned about decreased oral intake, but the patient denies hunger and states he is eating without difficulty.   Counseling and education provided to patient and family.        Review of Systems  General ROS: negative for chills, fever or weight loss  Psychological ROS: negative for hallucination, depression or suicidal ideation  Ophthalmic ROS: negative for blurry vision, photophobia or eye pain  ENT ROS: negative for epistaxis, sore throat or rhinorrhea  Respiratory ROS: no cough, shortness of breath, or wheezing  Cardiovascular ROS: no chest pain or dyspnea on exertion  Gastrointestinal ROS: no abdominal pain, change in bowel habits, or black/ bloody stools  Genito-Urinary ROS: no dysuria, trouble voiding, or hematuria  Musculoskeletal ROS: negative for gait disturbance or muscular weakness  Neurological ROS: no syncope or  seizures; no ataxia  Dermatological ROS: negative for pruritis, rash and jaundice          PAST HISTORY:     Past Medical History:   Diagnosis Date    Atrial fibrillation     Diabetes     Diabetes mellitus, type 2     Hyperlipidemia     Hypertension        No past surgical history on file.    Family History   Problem Relation Age of Onset    Heart disease Sister     No Known Problems Daughter     Heart disease Brother     No Known Problems Daughter        Social History     Social History    Marital status:      Spouse name: N/A    Number of children: 2    Years of education: N/A     Occupational History    Retired      Social History Main Topics    Smoking status: Former Smoker     Packs/day: 0.15     Start date: 7/13/1960     Quit date: 7/13/1970    Smokeless tobacco: Never Used      Comment: Retired  in [place]    Alcohol use No    Drug use: No    Sexual activity: Not Currently     Partners: Female     Other Topics Concern    None     Social History Narrative    None       MEDICATIONS & ALLERGIES:     Current Outpatient Prescriptions on File Prior to Visit   Medication Sig Dispense Refill    CALCIUM CARBONATE/VITAMIN D3 (CALCIUM 600 WITH VITAMIN D3 ORAL) Take 1 tablet by mouth once daily.      fish oil-omega-3 fatty acids 300-1,000 mg capsule Take 1 g by mouth once daily.      hydrochlorothiazide (MICROZIDE) 12.5 mg capsule       Lactobacillus acidophilus (PROBIOTIC) 10 billion cell Cap Take by mouth once daily.       LYCOPENE ORAL Take 5 mg by mouth once daily.      memantine (NAMENDA) 10 MG Tab Take 1 tablet (10 mg total) by mouth once daily. 30 tablet 11    metoprolol succinate (TOPROL-XL) 25 MG 24 hr tablet       moxifloxacin (AVELOX) 400 mg tablet Take 1 tablet (400 mg total) by mouth once daily. 10 tablet 0    multivitamin (ONE DAILY MULTIVITAMIN) per tablet Take 1 tablet by mouth once daily.      neomycin-polymyxin-hydrocortisone  (CORTISPORIN) 3.5-10,000-1 mg/mL-unit/mL-% otic suspension Use bid for nails 10 mL 11    PRADAXA 75 mg Cap       ranitidine (ZANTAC) 300 MG tablet Take 1 tablet (300 mg total) by mouth every evening. 30 tablet 11    dronedarone (MULTAQ) 400 mg Tab Take 400 mg by mouth 2 (two) times daily with meals.      hydrocortisone (ANUSOL-HC) 25 mg suppository UNWRAP AND PLACE 1 SUPPOSITORY (25 MG TOTAL) RECTALLY 2 (TWO) TIMES DAILY AS NEEDED FOR HEMORRHOIDS.  3     No current facility-administered medications on file prior to visit.         Review of patient's allergies indicates:  No Known Allergies    OBJECTIVE:     Vital Signs:  Vitals:    08/31/17 1008   BP: 103/62   Pulse: 71   Temp: 97.4 °F (36.3 °C)     Wt Readings from Last 1 Encounters:   08/31/17 1008 68.9 kg (151 lb 14.4 oz)     Body mass index is 23.1 kg/m².       Physical Exam:  /62 (BP Location: Left arm, Patient Position: Sitting, BP Method: Small (Automatic))   Pulse 71   Temp 97.4 °F (36.3 °C) (Oral)   Wt 68.9 kg (151 lb 14.4 oz)   BMI 23.10 kg/m²   General appearance: alert, cooperative, no distress  Constitutional:Oriented to person, place, and time +appears well-developed and well-nourished.   HEENT: Normocephalic, atraumatic, neck symmetrical, no nasal discharge   Eyes: conjunctivae/corneas clear, PERRL, EOM's intact  Lungs: clear to auscultation bilaterally, no dullness to percussion bilaterally  Heart: regular rate and rhythm without rub; no displacement of the PMI   Abdomen: soft, non-tender; bowel sounds normoactive; no organomegaly  Extremities: extremities symmetric; no clubbing, cyanosis, or edema  Integument: Skin color, texture, turgor normal; no rashes; hair distrubution normal  Neurologic: Alert and oriented X 3, normal strength, + memory impairment, delayed response to questions   Psychiatric: no pressured speech; normal affect; + evidence of mild impaired cognition     Laboratory  Lab Results   Component Value Date    WBC 10.59  08/25/2017    HGB 13.4 (L) 08/25/2017    HCT 40.7 08/25/2017    MCV 88 08/25/2017     08/25/2017     BMP  Lab Results   Component Value Date     08/25/2017    K 3.8 08/25/2017     08/25/2017    CO2 23 08/25/2017    BUN 21 08/25/2017    CREATININE 0.9 08/25/2017    CALCIUM 8.1 (L) 08/25/2017    ANIONGAP 8 08/25/2017    ESTGFRAFRICA >60 08/25/2017    EGFRNONAA >60 08/25/2017     Lab Results   Component Value Date    ALT 13 08/25/2017    AST 22 08/25/2017    ALKPHOS 71 08/25/2017    BILITOT 0.6 08/25/2017     Lab Results   Component Value Date    INR 1.1 08/24/2017     Lab Results   Component Value Date    HGBA1C 7.1 (H) 08/12/2017     No results for input(s): POCTGLUCOSE in the last 72 hours.      ASSESSMENT & PLAN:       CAP (community acquired pneumonia)  - recent hospitalization for atypical CAP with associated sepsis  - responded well to supportive care and empiric ABX  - no organism identified  - complete course of prescribed Avelox    Vascular dementia without behavioral disturbance  - followed by neurology  - still high functioning; has supervision and assistance from his family  - stable on Namenda    Essential hypertension  - blood pressure under good control at present; no change to medications     Paroxysmal a-fib  - stable on Bblocker, Multaq, Pradaxa      Patient will be released from [facility].  Has follow up with PCP, Dr Jerome, 11/1/17.   Instructions for the patient:      Scheduled Follow-up :  Future Appointments  Date Time Provider Department Center   9/29/2017 2:15 PM Elvin Del Castillo OD [facility] OPTOMTY [place]   11/1/2017 10:40 AM Ozzy Jerome MD [facility] IM [place]   3/19/2018 1:00 PM Surendra Sandhu MD [facility]       Post Visit Medication List:     Medication List          Accurate as of 8/31/17 12:59 PM. If you have any questions, ask your nurse or doctor.               CONTINUE taking these medications    CALCIUM 600 WITH VITAMIN D3 ORAL      dronedarone 400 mg Tab  Commonly known as:  MULTAQ     fish oil-omega-3 fatty acids 300-1,000 mg capsule     hydrochlorothiazide 12.5 mg capsule  Commonly known as:  MICROZIDE     hydrocortisone 25 mg suppository  Commonly known as:  ANUSOL-HC     LYCOPENE ORAL     memantine 10 MG Tab  Commonly known as:  NAMENDA  Take 1 tablet (10 mg total) by mouth once daily.     metoprolol succinate 25 MG 24 hr tablet  Commonly known as:  TOPROL-XL     moxifloxacin 400 mg tablet  Commonly known as:  AVELOX  Take 1 tablet (400 mg total) by mouth once daily.     neomycin-polymyxin-hydrocortisone 3.5-10,000-1 mg/mL-unit/mL-% otic suspension  Commonly known as:  CORTISPORIN  Use bid for nails     ONE DAILY MULTIVITAMIN per tablet  Generic drug:  multivitamin     PRADAXA 75 mg Cap  Generic drug:  dabigatran etexilate     PROBIOTIC 10 billion cell Cap  Generic drug:  Lactobacillus acidophilus     ranitidine 300 MG tablet  Commonly known as:  ZANTAC  Take 1 tablet (300 mg total) by mouth every evening.            Signing Physician:  Gardenia Ji MD

## 2017-09-11 ENCOUNTER — OFFICE VISIT (OUTPATIENT)
Dept: NEUROLOGY | Facility: CLINIC | Age: 82
End: 2017-09-11
Payer: COMMERCIAL

## 2017-09-11 VITALS
HEART RATE: 68 BPM | BODY MASS INDEX: 23.69 KG/M2 | HEIGHT: 68 IN | WEIGHT: 156.31 LBS | SYSTOLIC BLOOD PRESSURE: 125 MMHG | DIASTOLIC BLOOD PRESSURE: 58 MMHG

## 2017-09-11 DIAGNOSIS — I48.0 PAROXYSMAL A-FIB: ICD-10-CM

## 2017-09-11 DIAGNOSIS — E11.49 DM (DIABETES MELLITUS), TYPE 2 WITH NEUROLOGICAL COMPLICATIONS: ICD-10-CM

## 2017-09-11 DIAGNOSIS — G31.84 MCI (MILD COGNITIVE IMPAIRMENT) WITH MEMORY LOSS: ICD-10-CM

## 2017-09-11 DIAGNOSIS — I10 ESSENTIAL HYPERTENSION: ICD-10-CM

## 2017-09-11 DIAGNOSIS — Z86.73 OLD CEREBROVASCULAR ACCIDENT (CVA) WITHOUT LATE EFFECT: ICD-10-CM

## 2017-09-11 DIAGNOSIS — F01.50 VASCULAR DEMENTIA WITHOUT BEHAVIORAL DISTURBANCE: Primary | ICD-10-CM

## 2017-09-11 PROCEDURE — 1159F MED LIST DOCD IN RCRD: CPT | Mod: S$GLB,,, | Performed by: PSYCHIATRY & NEUROLOGY

## 2017-09-11 PROCEDURE — 99214 OFFICE O/P EST MOD 30 MIN: CPT | Mod: S$GLB,,, | Performed by: PSYCHIATRY & NEUROLOGY

## 2017-09-11 PROCEDURE — 99999 PR PBB SHADOW E&M-EST. PATIENT-LVL III: CPT | Mod: PBBFAC,,, | Performed by: PSYCHIATRY & NEUROLOGY

## 2017-09-11 PROCEDURE — 3008F BODY MASS INDEX DOCD: CPT | Mod: S$GLB,,, | Performed by: PSYCHIATRY & NEUROLOGY

## 2017-09-11 PROCEDURE — 1126F AMNT PAIN NOTED NONE PRSNT: CPT | Mod: S$GLB,,, | Performed by: PSYCHIATRY & NEUROLOGY

## 2017-09-11 RX ORDER — MEMANTINE HYDROCHLORIDE 10 MG/1
10 TABLET ORAL 2 TIMES DAILY
Qty: 60 TABLET | Refills: 11 | Status: SHIPPED | OUTPATIENT
Start: 2017-09-11 | End: 2018-09-21 | Stop reason: SDUPTHER

## 2017-09-11 NOTE — PATIENT INSTRUCTIONS
Discussed with daughter.  He has cognitive impairment that is most likely on a vascular basis given his multiple risk factors.  He does not have any significant progression noted.  Discussed lifestyle changes, diet and exercise.  I Namenda 10 mg will be gradually increased over a few weeks to twice a day.  Discussed side effects including gait imbalance.  If stable to continue follow-up with his primary care physician and advised them to have check his swallowing difficulty.

## 2017-09-11 NOTE — PROGRESS NOTES
Subjective:       Patient ID: Sherri Bunch is a 89 y.o. male.    Chief Complaint:  Memory Loss      History of Present Illness  HPI  This is an 89-year-old Chinese male who had been seen by me for evaluation of memory difficulties. His daughter was present today and was the primary historian and . He was apparently living in [place], [place] until December 2014. He is a retired  professor at the local university. According to his spouse over the past couple of years he has been having difficulty with retention of recent information, difficulty remembering recent conversations and occasionally repeating himself. He was apparently seen by physicians in [place] and because he was having gait difficulty was prescribed levodopa which did not help very much. He did not have any problems with tremor or shuffling and his gait was described as mild clumsiness especially when getting up and walking. He had no weakness.  He is no longer on the levodopa.  When last seen by me he was started on Namenda 10 mg daily which has helped a little.  He has a history of atrial fibrillation and is presently on anticoagulation.  He had a CT Scan of the brain that shows evidence of multiple small ischemic lesions in the right frontoparietal and bilateral basal ganglia.  This is most likely related to the history of atrial fibrillation.  He was recently admitted to the hospital for problems related to dehydration and had a repeat MRI scan of the brain done that showed essentially no change compared to the previous CT scan of the brain.  No acute abnormality was noted otherwise.  The patient is presently back to his usual baseline.  He is able to take care of his personal hygiene and dress himself however she does report that while eating he occasionally tends to choke on his food.  However his appetite and sleep is good.       Review of Systems  Review of Systems   Constitutional: Negative.    HENT: Positive for  hearing loss (Mild hearing impairment).    Eyes: Negative.  Negative for visual disturbance.   Respiratory: Negative.  Negative for shortness of breath.    Cardiovascular: Negative.  Negative for chest pain and palpitations.   Gastrointestinal: Negative.    Endocrine: Negative.    Genitourinary: Negative.    Musculoskeletal: Negative.  Negative for back pain and gait problem.   Skin: Negative.    Allergic/Immunologic: Negative.    Neurological: Negative.  Negative for dizziness, tremors, seizures, syncope, speech difficulty, weakness, numbness and headaches.   Hematological: Negative.    Psychiatric/Behavioral: Positive for decreased concentration. Negative for sleep disturbance.       Objective:      Neurologic Exam      Physical Exam   Constitutional: He appears well-developed and well-nourished.   HENT:   Head: Normocephalic and atraumatic.   Right Ear: Hearing normal.   Left Ear: Hearing normal.   Eyes:   Fundus examination shows sharp disc margins.   Neck: Normal range of motion. Neck supple. Carotid bruit is not present.   Neurological: He is alert. He has normal reflexes. He displays no atrophy and no tremor. No cranial nerve deficit (Visual fields at bedside testing essentially normal.  No facial asymmetry noted with facial movements and sensory exam being normal/symmetrical.  Corneals/gag reflexes normal.  Tongue & palate movements normal.  Hearing unimpaired.  Shoulder shrug was norm) or sensory deficit (primary sensations intact). He exhibits normal muscle tone. He displays a negative Romberg sign. Coordination and gait (Gait is stable though slightly wide-based.  Minimal clumsiness of fine motor movements bilaterally) normal. He displays no Babinski's sign on the right side. He displays no Babinski's sign on the left side.   Mental status examination: His daughter was the .  Patient is oriented to place and person and grossly to time.  He knows the month and year.  Recall of recent and past  information is good.  Immediate recall is 1/3.  Mild impairment in attention span and concentration.  Judgment and insight is normal.  Processing of information is a little slow.  Language functions are intact with no evidence of aphasia or dysarthria.  Comprehension is unimpaired.  He is able to follow simple commands without difficulty.  Affect is appropriate, mood was even.  No thought disorder is noted.   Vitals reviewed.        Assessment:        1. Vascular dementia without behavioral disturbance    2. DM (diabetes mellitus), type 2 with neurological complications    3. Paroxysmal a-fib    4. Essential hypertension    5. Old cerebrovascular accident (CVA) without late effect             Plan:       Discussed with daughter.  He has cognitive impairment that is most likely on a vascular basis given his multiple risk factors.  He does not have any significant progression noted.  Discussed lifestyle changes, diet and exercise.  I Namenda 10 mg will be gradually increased over a few weeks to twice a day.  Discussed side effects including gait imbalance.  If stable to continue follow-up with his primary care physician and advised them to have check his swallowing difficulty.  Follow-up in 6 months if stable.

## 2017-09-13 PROBLEM — A41.9 SEPSIS: Status: RESOLVED | Noted: 2017-08-25 | Resolved: 2017-09-13

## 2017-09-13 PROBLEM — E86.9 VOLUME DEPLETION: Status: RESOLVED | Noted: 2017-08-25 | Resolved: 2017-09-13

## 2017-09-13 PROBLEM — J18.9 SEPSIS DUE TO PNEUMONIA: Status: RESOLVED | Noted: 2017-08-25 | Resolved: 2017-09-13

## 2017-09-13 PROBLEM — A41.9 SEPSIS DUE TO PNEUMONIA: Status: RESOLVED | Noted: 2017-08-25 | Resolved: 2017-09-13

## 2017-09-13 PROBLEM — N17.9 AKI (ACUTE KIDNEY INJURY): Status: RESOLVED | Noted: 2017-08-25 | Resolved: 2017-09-13

## 2017-09-13 PROBLEM — R53.1 GENERALIZED WEAKNESS: Status: RESOLVED | Noted: 2017-08-24 | Resolved: 2017-09-13

## 2017-09-13 PROBLEM — E87.20 LACTIC ACIDOSIS: Status: RESOLVED | Noted: 2017-08-25 | Resolved: 2017-09-13

## 2017-09-29 ENCOUNTER — OFFICE VISIT (OUTPATIENT)
Dept: OPTOMETRY | Facility: CLINIC | Age: 82
End: 2017-09-29
Payer: COMMERCIAL

## 2017-09-29 DIAGNOSIS — H52.4 PRESBYOPIA OU: ICD-10-CM

## 2017-09-29 DIAGNOSIS — E11.9 TYPE 2 DIABETES MELLITUS WITHOUT RETINOPATHY: Primary | ICD-10-CM

## 2017-09-29 DIAGNOSIS — Z13.5 SCREENING FOR GLAUCOMA: ICD-10-CM

## 2017-09-29 DIAGNOSIS — H25.13 NUCLEAR SCLEROSIS, BILATERAL: ICD-10-CM

## 2017-09-29 PROCEDURE — 92014 COMPRE OPH EXAM EST PT 1/>: CPT | Mod: S$GLB,,, | Performed by: OPTOMETRIST

## 2017-09-29 PROCEDURE — 92015 DETERMINE REFRACTIVE STATE: CPT | Mod: S$GLB,,, | Performed by: OPTOMETRIST

## 2017-09-29 PROCEDURE — 99999 PR PBB SHADOW E&M-EST. PATIENT-LVL II: CPT | Mod: PBBFAC,,, | Performed by: OPTOMETRIST

## 2017-10-22 ENCOUNTER — PATIENT MESSAGE (OUTPATIENT)
Dept: INTERNAL MEDICINE | Facility: CLINIC | Age: 82
End: 2017-10-22

## 2017-11-08 ENCOUNTER — OFFICE VISIT (OUTPATIENT)
Dept: INTERNAL MEDICINE | Facility: CLINIC | Age: 82
End: 2017-11-08
Payer: COMMERCIAL

## 2017-11-08 VITALS
HEART RATE: 72 BPM | SYSTOLIC BLOOD PRESSURE: 122 MMHG | TEMPERATURE: 98 F | BODY MASS INDEX: 25.05 KG/M2 | DIASTOLIC BLOOD PRESSURE: 60 MMHG | WEIGHT: 155.88 LBS | HEIGHT: 66 IN

## 2017-11-08 DIAGNOSIS — F01.50 VASCULAR DEMENTIA WITHOUT BEHAVIORAL DISTURBANCE: ICD-10-CM

## 2017-11-08 DIAGNOSIS — M85.80 OSTEOPENIA, UNSPECIFIED LOCATION: ICD-10-CM

## 2017-11-08 DIAGNOSIS — Z86.73 OLD CEREBROVASCULAR ACCIDENT (CVA) WITHOUT LATE EFFECT: ICD-10-CM

## 2017-11-08 DIAGNOSIS — Z09 FOLLOW UP: Primary | ICD-10-CM

## 2017-11-08 DIAGNOSIS — E55.9 VITAMIN D DEFICIENCY: ICD-10-CM

## 2017-11-08 DIAGNOSIS — J18.9 COMMUNITY ACQUIRED PNEUMONIA, UNSPECIFIED LATERALITY: ICD-10-CM

## 2017-11-08 DIAGNOSIS — E11.49 DM (DIABETES MELLITUS), TYPE 2 WITH NEUROLOGICAL COMPLICATIONS: ICD-10-CM

## 2017-11-08 DIAGNOSIS — I48.0 PAROXYSMAL A-FIB: ICD-10-CM

## 2017-11-08 DIAGNOSIS — N39.42 URINARY INCONTINENCE WITHOUT SENSORY AWARENESS: ICD-10-CM

## 2017-11-08 DIAGNOSIS — I10 ESSENTIAL HYPERTENSION: ICD-10-CM

## 2017-11-08 PROCEDURE — 99999 PR PBB SHADOW E&M-EST. PATIENT-LVL III: CPT | Mod: PBBFAC,,, | Performed by: FAMILY MEDICINE

## 2017-11-08 PROCEDURE — 99214 OFFICE O/P EST MOD 30 MIN: CPT | Mod: S$GLB,,, | Performed by: FAMILY MEDICINE

## 2017-11-08 RX ORDER — LEVOCETIRIZINE DIHYDROCHLORIDE 5 MG/1
5 TABLET, FILM COATED ORAL NIGHTLY
Qty: 30 TABLET | Refills: 11 | Status: SHIPPED | OUTPATIENT
Start: 2017-11-08 | End: 2018-11-08

## 2017-11-08 NOTE — PROGRESS NOTES
Subjective:       Patient ID: Sherri Bunch is a 89 y.o. male.    Chief Complaint: Follow-up (hospital)    HPI 89-year-old male presents to clinic today accompanied by his daughter and son-in-law for follow-up after hospital admission.  The patient has are reviewed in seen in priority care clinic.  He was recently hospitalized secondary to altered mental status from dehydration and likely viral illness versus atypical pneumonia.  The patient was started on Avelox and also improved with supportive care and IV fluids.  He has seen neurology secondary to altered mental status it is noted to have vascular dementia.  He was started on Namenda which has recently been increased to 2 tablets daily.  The daughter's concern at this time is urinary incontinence.  The patient uses diaper frequently forgets that the taper is on and continues to attempt to use the restroom frequently dripping urine.  She is interested in further recommendations.  Secondarily the patient occasionally suffers a cough from acid reflux and postnasal drip for which he has been given Zantac and Xyzal with improvement.  He requires a refill of Xyzal.  Review of Systems   Constitutional: Negative for appetite change, chills, fatigue and fever.   HENT: Negative for congestion, ear pain, hearing loss, postnasal drip, rhinorrhea, sinus pressure, sore throat and tinnitus.    Eyes: Negative for redness, itching and visual disturbance.   Respiratory: Positive for cough. Negative for chest tightness and shortness of breath.    Cardiovascular: Negative for chest pain and palpitations.   Gastrointestinal: Positive for abdominal pain (reflux). Negative for constipation, diarrhea, nausea and vomiting.   Genitourinary: Positive for urgency. Negative for decreased urine volume, difficulty urinating, dysuria, frequency and hematuria.   Musculoskeletal: Negative for back pain, myalgias, neck pain and neck stiffness.   Skin: Negative for rash.   Neurological: Positive for  weakness. Negative for dizziness, light-headedness and headaches.   Psychiatric/Behavioral: Negative.        Objective:      Physical Exam   Constitutional: He is oriented to person, place, and time. He appears well-developed and well-nourished. No distress.   HENT:   Head: Normocephalic and atraumatic.   Right Ear: External ear normal.   Left Ear: External ear normal.   Nose: Nose normal.   Mouth/Throat: Oropharynx is clear and moist. No oropharyngeal exudate.   Eyes: Conjunctivae and EOM are normal. Pupils are equal, round, and reactive to light. Right eye exhibits no discharge. Left eye exhibits no discharge. No scleral icterus.   Neck: Normal range of motion. Neck supple. No JVD present. No tracheal deviation present. No thyromegaly present.   Cardiovascular: Normal rate, regular rhythm, normal heart sounds and intact distal pulses.  Exam reveals no gallop and no friction rub.    No murmur heard.  Pulmonary/Chest: Effort normal and breath sounds normal. No stridor. No respiratory distress. He has no wheezes. He has no rales.   Abdominal: Soft. Bowel sounds are normal. He exhibits no distension and no mass. There is no tenderness. There is no rebound and no guarding.   Musculoskeletal: Normal range of motion. He exhibits no edema or tenderness.   Lymphadenopathy:     He has no cervical adenopathy.   Neurological: He is alert and oriented to person, place, and time.   Skin: Skin is warm and dry. No rash noted. He is not diaphoretic. No erythema. No pallor.   Psychiatric: He has a normal mood and affect. His behavior is normal. Judgment and thought content normal.   Nursing note and vitals reviewed.      Assessment:       1. Follow up    2. Community acquired pneumonia, unspecified laterality    3. Paroxysmal a-fib    4. Vascular dementia without behavioral disturbance    5. Old cerebrovascular accident (CVA) without late effect    6. Essential hypertension    7. DM (diabetes mellitus), type 2 with neurological  complications    8. Vitamin D deficiency    9. Osteopenia, unspecified location    10. Urinary incontinence without sensory awareness        Plan:       1.  Continue follow-up with cardiology as scheduled.  Atrial fibrillation and hypertension are well controlled.  2.  Continue Namenda as prescribed.  3.  Diabetes is well-controlled with diet.  4.  Encourage hydration.  5.  Continue multivitamin daily.  6.  Refer to urology for further evaluation of urinary incontinence.  7.  Return to clinic as needed or for annual exam as scheduled.

## 2017-11-27 ENCOUNTER — OFFICE VISIT (OUTPATIENT)
Dept: UROLOGY | Facility: CLINIC | Age: 82
End: 2017-11-27
Payer: COMMERCIAL

## 2017-11-27 VITALS
DIASTOLIC BLOOD PRESSURE: 65 MMHG | BODY MASS INDEX: 24.91 KG/M2 | HEART RATE: 74 BPM | HEIGHT: 66 IN | WEIGHT: 155 LBS | SYSTOLIC BLOOD PRESSURE: 138 MMHG

## 2017-11-27 DIAGNOSIS — N13.8 BPH WITH URINARY OBSTRUCTION: ICD-10-CM

## 2017-11-27 DIAGNOSIS — E11.49 DM (DIABETES MELLITUS), TYPE 2 WITH NEUROLOGICAL COMPLICATIONS: ICD-10-CM

## 2017-11-27 DIAGNOSIS — N39.41 URGENCY INCONTINENCE: ICD-10-CM

## 2017-11-27 DIAGNOSIS — Z86.73 OLD CEREBROVASCULAR ACCIDENT (CVA) WITHOUT LATE EFFECT: Primary | ICD-10-CM

## 2017-11-27 DIAGNOSIS — N40.1 BPH WITH URINARY OBSTRUCTION: ICD-10-CM

## 2017-11-27 PROCEDURE — 99999 PR PBB SHADOW E&M-EST. PATIENT-LVL III: CPT | Mod: PBBFAC,,, | Performed by: UROLOGY

## 2017-11-27 PROCEDURE — 99204 OFFICE O/P NEW MOD 45 MIN: CPT | Mod: S$GLB,,, | Performed by: UROLOGY

## 2017-11-27 RX ORDER — DUTASTERIDE 0.5 MG/1
0.5 CAPSULE, LIQUID FILLED ORAL DAILY
Qty: 30 CAPSULE | Refills: 11 | Status: SHIPPED | OUTPATIENT
Start: 2017-11-27 | End: 2018-10-05 | Stop reason: ALTCHOICE

## 2017-11-27 RX ORDER — TAMSULOSIN HYDROCHLORIDE 0.4 MG/1
0.4 CAPSULE ORAL DAILY
Qty: 30 CAPSULE | Refills: 11 | Status: SHIPPED | OUTPATIENT
Start: 2017-11-27 | End: 2018-10-05 | Stop reason: ALTCHOICE

## 2017-11-27 NOTE — LETTER
November 27, 2017      Ozzy Jerome MD  [address]  [place] [address]           [place] [facility]  [address] [facility]  [place] [address]  Phone: [phone]          Patient: Sherri Bunch   MR Number: [id number]   YOB: 1927   Date of Visit: 11/27/2017       Dear Dr. Ozzy Jerome:    Thank you for referring Sherri Bunch to me for evaluation. Attached you will find relevant portions of my assessment and plan of care.    If you have questions, please do not hesitate to call me. I look forward to following Sherri Bunch along with you.    Sincerely,    Ty Rodriguez MD    Enclosure  CC:  No Recipients    If you would like to receive this communication electronically, please contact [email or web address] or [phone] to request more information on [employer] access.    For providers and/or their staff who would like to refer a patient to [facility], please contact us through our one-stop-shop provider referral line, Storm Valdivia, at [phone].    If you feel you have received this communication in error or would no longer like to receive these types of communications, please e-mail [email or web address]

## 2017-11-27 NOTE — PROGRESS NOTES
CC: urgency, urge incontinence    Sherri Bunch is a 89 y.o. man who is here for the evaluation of Urinary Incontinence (has cognitive issues -wears one depends a day. he tries to make it to restroom and cant' make it . he changes pants 5-6 times a day. )  a new pt referred by his PCP.  Pt is brought by his daughter and a son-in-law ( who is a pt of mine).  C/o frequent urinary incontinence, urgency and urge incontinence.  Lives with a daughter.  His wife is with him.   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  Has been here in [place] since 2015.    Denies flank pain, dysuira, hematuria .      Past Medical History:   Diagnosis Date    Atrial fibrillation     Diabetes     Diabetes mellitus, type 2     Hyperlipidemia     Hypertension      History reviewed. No pertinent surgical history.  Social History   Substance Use Topics    Smoking status: Former Smoker     Packs/day: 0.15     Start date: 7/13/1960     Quit date: 7/13/1970    Smokeless tobacco: Never Used      Comment: Retired  in [place]    Alcohol use No     Family History   Problem Relation Age of Onset    Heart disease Sister     No Known Problems Daughter     Heart disease Brother     No Known Problems Daughter      Allergy:  Review of patient's allergies indicates:  No Known Allergies  Outpatient Encounter Prescriptions as of 11/27/2017   Medication Sig Dispense Refill    CALCIUM CARBONATE/VITAMIN D3 (CALCIUM 600 WITH VITAMIN D3 ORAL) Take 1 tablet by mouth once daily.      dronedarone (MULTAQ) 400 mg Tab Take 400 mg by mouth 2 (two) times daily with meals.      dutasteride (AVODART) 0.5 mg capsule Take 1 capsule (0.5 mg total) by mouth once daily. 30 capsule 11    fish oil-omega-3 fatty acids 300-1,000 mg capsule Take 1 g by mouth once daily.      FLUZONE HIGH-DOSE 2017-18, PF, 180 mcg/0.5 mL vaccine       hydrochlorothiazide (MICROZIDE) 12.5 mg capsule       hydrocortisone (ANUSOL-HC) 25 mg suppository  UNWRAP AND PLACE 1 SUPPOSITORY (25 MG TOTAL) RECTALLY 2 (TWO) TIMES DAILY AS NEEDED FOR HEMORRHOIDS.  3    Lactobacillus acidophilus (PROBIOTIC) 10 billion cell Cap Take by mouth once daily.       levocetirizine (XYZAL) 5 MG tablet Take 1 tablet (5 mg total) by mouth every evening. 30 tablet 11    LYCOPENE ORAL Take 5 mg by mouth once daily.      memantine (NAMENDA) 10 MG Tab Take 1 tablet (10 mg total) by mouth 2 (two) times daily. 60 tablet 11    metoprolol succinate (TOPROL-XL) 25 MG 24 hr tablet       mirabegron (MYRBETRIQ) 50 mg Tb24 Take 1 tablet (50 mg total) by mouth once daily. 30 tablet 11    multivitamin (ONE DAILY MULTIVITAMIN) per tablet Take 1 tablet by mouth once daily.      neomycin-polymyxin-hydrocortisone (CORTISPORIN) 3.5-10,000-1 mg/mL-unit/mL-% otic suspension Use bid for nails 10 mL 11    PRADAXA 75 mg Cap       ranitidine (ZANTAC) 300 MG tablet Take 1 tablet (300 mg total) by mouth every evening. 30 tablet 11    tamsulosin (FLOMAX) 0.4 mg Cp24 Take 1 capsule (0.4 mg total) by mouth once daily. 30 capsule 11     No facility-administered encounter medications on file as of 11/27/2017.      Review of Systems   General ROS: GENERAL:  No weight gain or loss  SKIN:  No rashes or lacerations  HEAD:  No headaches  EYES:  No exophthalmos, jaundice or blindness  EARS:  No dizziness, tinnitus or hearing loss  NOSE:  No changes in smell  MOUTH & THROAT:  No dyskinetic movements or obvious goiter  CHEST:  No shortness of breath, hyperventilation or cough  CARDIOVASCULAR:  No tachycardia or chest pain  ABDOMEN:  No nausea, vomiting, pain, constipation or diarrhea  URINARY:  No frequency, dysuria or sexual dysfunction  ENDOCRINE:  No polydipsia, polyuria  MUSCULOSKELETAL:  No pain or stiffness of the joints  NEUROLOGIC:  No weakness, sensory changes, seizures, confusion, memory loss, tremor or other abnormal movements  Physical Exam     Vitals:    11/27/17 1502   BP: 138/65   Pulse: 74      Physical Exam   Constitutional: He is oriented to person, place, and time. He appears well-developed and well-nourished. No distress.   HENT:   Head: Normocephalic and atraumatic.   Right Ear: External ear normal.   Left Ear: External ear normal.   Nose: Nose normal.   Mouth/Throat: Oropharynx is clear and moist.   Eyes: Conjunctivae are normal. Pupils are equal, round, and reactive to light.   Neck: Normal range of motion. Neck supple. No JVD present. No tracheal deviation present. No thyromegaly present.   Cardiovascular: Normal rate, regular rhythm, normal heart sounds and intact distal pulses.  Exam reveals no gallop and no friction rub.    No murmur heard.  Pulmonary/Chest: Effort normal and breath sounds normal. No respiratory distress. He has no wheezes. He exhibits no tenderness.   Abdominal: Soft. Bowel sounds are normal. He exhibits no distension and no mass. There is no tenderness. There is no rebound and no guarding.   Genitourinary: Rectum normal and penis normal. No penile tenderness.   Genitourinary Comments: Prostate 80 grams with negative nodule or negative tenderness     Musculoskeletal: Normal range of motion. He exhibits no edema, tenderness or deformity.   Lymphadenopathy:     He has no cervical adenopathy.   Neurological: He is alert and oriented to person, place, and time.   Skin: Skin is warm and dry. He is not diaphoretic.     Psychiatric: He has a normal mood and affect. His behavior is normal. Thought content normal.     Genitalia:  Scrotum: no rash or lesion  Normal symmetric epididymis without masses  Normal vas palpated  Normal size, symmetric testicles with no masses   Normal urethral meatus with no discharge  Normal circumcised penis with no lesion   Rectal:  Normal perineum and anus upon inspection.  Normal tone, no masses or tenderness;     LABS:  No results found for: PSA, PSADIAG, PSATOTAL, PSAFREE, PSAFREEPCT  No results found for this or any previous visit.  Lab Results    Component Value Date    CREATININE 0.9 08/25/2017    CREATININE 1.2 08/24/2017    CREATININE 1.0 08/12/2017     No results found for this or any previous visit.  Urine Culture, Routine   Date Value Ref Range Status   08/24/2017   Final    Multiple organisms isolated. None in predominance.  Repeat if   08/24/2017 clinically necessary.  Final     Assessment and Plan:  Sherri was seen today for urinary incontinence.    Diagnoses and all orders for this visit:    Old cerebrovascular accident (CVA) without late effect    BPH with urinary obstruction  -     tamsulosin (FLOMAX) 0.4 mg Cp24; Take 1 capsule (0.4 mg total) by mouth once daily.  -     dutasteride (AVODART) 0.5 mg capsule; Take 1 capsule (0.5 mg total) by mouth once daily.    DM (diabetes mellitus), type 2 with neurological complications    Urgency incontinence  -     mirabegron (MYRBETRIQ) 50 mg Tb24; Take 1 tablet (50 mg total) by mouth once daily.    start combination therapy for BPH with obstruction.  Add myrbetriq for his OAB and urge incontinence.  Timed voiding every 2 hours recommended.  Urinal and voiding diary given.  OK to use diapers with extra pads.  All questions answered.    Follow-up:  Return in about 3 months (around 2/27/2018).

## 2017-11-28 ENCOUNTER — CLINICAL SUPPORT (OUTPATIENT)
Dept: AUDIOLOGY | Facility: CLINIC | Age: 82
End: 2017-11-28
Payer: COMMERCIAL

## 2017-11-28 ENCOUNTER — OFFICE VISIT (OUTPATIENT)
Dept: OTOLARYNGOLOGY | Facility: CLINIC | Age: 82
End: 2017-11-28
Payer: COMMERCIAL

## 2017-11-28 VITALS
SYSTOLIC BLOOD PRESSURE: 157 MMHG | WEIGHT: 160.25 LBS | HEART RATE: 67 BPM | DIASTOLIC BLOOD PRESSURE: 70 MMHG | TEMPERATURE: 98 F | HEIGHT: 66 IN | BODY MASS INDEX: 25.75 KG/M2

## 2017-11-28 DIAGNOSIS — H61.23 BILATERAL IMPACTED CERUMEN: ICD-10-CM

## 2017-11-28 DIAGNOSIS — Z97.4 WEARS HEARING AID: ICD-10-CM

## 2017-11-28 DIAGNOSIS — H90.3 SENSORINEURAL HEARING LOSS, BILATERAL: Primary | ICD-10-CM

## 2017-11-28 DIAGNOSIS — Z86.69 HISTORY OF HEARING LOSS: Primary | ICD-10-CM

## 2017-11-28 PROCEDURE — 69210 REMOVE IMPACTED EAR WAX UNI: CPT | Mod: S$GLB,,, | Performed by: OTOLARYNGOLOGY

## 2017-11-28 PROCEDURE — 92557 COMPREHENSIVE HEARING TEST: CPT | Mod: S$GLB,,, | Performed by: AUDIOLOGIST

## 2017-11-28 PROCEDURE — 99999 PR PBB SHADOW E&M-EST. PATIENT-LVL III: CPT | Mod: PBBFAC,,, | Performed by: OTOLARYNGOLOGY

## 2017-11-28 PROCEDURE — 92567 TYMPANOMETRY: CPT | Mod: S$GLB,,, | Performed by: AUDIOLOGIST

## 2017-11-28 PROCEDURE — 99203 OFFICE O/P NEW LOW 30 MIN: CPT | Mod: 25,S$GLB,, | Performed by: OTOLARYNGOLOGY

## 2017-11-28 NOTE — PATIENT INSTRUCTIONS
Audiometry reviewed ( pure tone responses only)  Ears cleaned carefully  I encouraged consultation with MAEGAN Koo for hearing aid programming at future visit  Monitor hearing yearly; yearly ear cleaning

## 2017-11-29 NOTE — PROGRESS NOTES
Subjective:       Patient ID: Sherri Bunch is a 89 y.o. male.    Chief Complaint: No chief complaint on file.    HPI: Mr. 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  He is accompanied by two younger family members today. One of them ( female)  indicated his previous treatment by a physician at the [facility].  Not much was done for him there.  She provides as much detail as she can about his past medical treatment and medical history.   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  He was fit for hearing aids while in [facility] in 2014.  They are [employer] devices.  The patient wears his hearing aids when necessary.   His family history is unknown.    One of his relatives denies his history of childhood ear infections.  He denies ringing perception in his ears.  He was diagnosed with Ménière's disease in his 30s per history.    PMH: Heart disease, diabetes, hearing loss, atrial fibrillation to 3 times per year  History reviewed. No pertinent surgical history.  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  Family history of hearing loss: unknown.    Respiratory:  Positive for asthma and recent cough.    Gastrointestinal:  Blood in stool: hemorrhoids.   Other:  Negative for rash.         The ppatient completed an audiometric study performed by the [facility] audiology service without speech testing due to language barrier.  Results reviewed with the patient and his family in detail.  Objective:         Blood pressure 157/70 pulse 67 temperature 97.6 height 5 feet 6 inches weight 160 pounds  Gen.: Alert and oriented but very quiet gentleman who does not speak English in no apparent distress.    His  bilateral hearing aids are being assessed by one of our audiologists during his physical examination today.  Physical Exam   Constitutional: He is oriented to person, place, and time. He appears well-developed and well-nourished.   HENT:   Head: Normocephalic.   Right Ear: Hearing, tympanic membrane and ear canal normal. No drainage. No foreign bodies. No mastoid tenderness. Tympanic membrane is not perforated. No decreased hearing is noted.   Left Ear: Hearing, tympanic membrane and ear canal normal. No drainage. No foreign bodies. No mastoid tenderness. Tympanic membrane is not perforated. No decreased hearing is noted.   Ears:    Nose: No nose lacerations, nasal deformity, septal deviation or nasal septal hematoma. No epistaxis. Right sinus exhibits no maxillary sinus tenderness and no frontal sinus tenderness. Left sinus exhibits no maxillary sinus tenderness and no frontal sinus tenderness.   Mouth/Throat: Uvula is midline, oropharynx is clear and moist and mucous membranes are normal. He does not have dentures. No oral lesions. No trismus in the jaw. No uvula swelling or dental caries. No oropharyngeal exudate or tonsillar abscesses.   Neck: No thyromegaly present.   Pulmonary/Chest: Effort normal. No stridor.   Lymphadenopathy:     He has no cervical adenopathy.   Neurological: He is alert and oriented to person, place, and time.   Skin: No rash noted.   Psychiatric: His behavior is normal.       Assessment:       1. History of hearing loss    2. Bilateral impacted cerumen    3. Wears hearing aid     4.     Hx of Meniere's disease ( according to relative in attendance today)    Plan:     Audiometry reviewed ( pure tone responses only)  Ears cleaned carefully  I encouraged consultation with MAEGAN Koo for proper/updated hearing aid programming at future visit  Monitor hearing yearly; yearly ear cleaning

## 2017-11-30 ENCOUNTER — TELEPHONE (OUTPATIENT)
Dept: UROLOGY | Facility: CLINIC | Age: 82
End: 2017-11-30

## 2017-11-30 DIAGNOSIS — N39.41 URGENCY INCONTINENCE: Primary | ICD-10-CM

## 2017-11-30 RX ORDER — DARIFENACIN 7.5 MG/1
7.5 TABLET, EXTENDED RELEASE ORAL DAILY
Qty: 30 TABLET | Refills: 0 | Status: SHIPPED | OUTPATIENT
Start: 2017-11-30 | End: 2017-12-30 | Stop reason: SDUPTHER

## 2017-11-30 NOTE — TELEPHONE ENCOUNTER
Received fax that myrbetriq was denied and needed PA, I completed the form and stated that this med was chosen due to his vascuar dementia as well as OAB dx. It was still denied. Fax states that he must try and fail darifenacin ER, oxybutynin, tolterodine, trospium. Before deaming myrbetriq medically necessary. If the MD would like to appeal that attached an appeal form.

## 2017-11-30 NOTE — TELEPHONE ENCOUNTER
Diagnoses and all orders for this visit:    Urgency incontinence  -     darifenacin (ENABLEX) 7.5 MG Tb24; Take 1 tablet (7.5 mg total) by mouth once daily.    will try darifenacin 7.5 mg daily first.  eRxed to the pharmacy.  Please call and advise the patient to take the medication as given.

## 2017-12-03 ENCOUNTER — PATIENT MESSAGE (OUTPATIENT)
Dept: UROLOGY | Facility: CLINIC | Age: 82
End: 2017-12-03

## 2017-12-14 ENCOUNTER — PATIENT MESSAGE (OUTPATIENT)
Dept: NEUROLOGY | Facility: CLINIC | Age: 82
End: 2017-12-14

## 2017-12-15 ENCOUNTER — CLINICAL SUPPORT (OUTPATIENT)
Dept: AUDIOLOGY | Facility: CLINIC | Age: 82
End: 2017-12-15

## 2017-12-15 DIAGNOSIS — H90.3 SENSORINEURAL HEARING LOSS, BILATERAL: Primary | ICD-10-CM

## 2017-12-15 PROCEDURE — 99499 UNLISTED E&M SERVICE: CPT | Mod: S$GLB,,, | Performed by: OTOLARYNGOLOGY

## 2017-12-15 NOTE — PROGRESS NOTES
Sherri Bunch was seen in the clinic today for a hearing aid follow-up. He was accompanied by his daughter who interpreted.     Mr. Bunch has a pair of OtTyrogenex hearing aids [place]. White boots and cables were used to program Mr. Bunch's hearing aids. His hearing aids were programmed to his most recent audiogram. The feedback test was ran. Mr. Bunch was pleased with the hearing aids. His daughter was encouraged for Mr. Bunch to wear the hearing aids more consistently. They purchased a binaural charlee-clip so Mr. Bunch wouldn't lose the hearing aids.    Mr. Bunch will call the clinic for a follow-up appointment as needed.

## 2017-12-15 NOTE — TELEPHONE ENCOUNTER
Current prescription is going to be filled for 60 tabs, and ready for  today. Daughter has been notified.

## 2017-12-30 DIAGNOSIS — N39.41 URGENCY INCONTINENCE: ICD-10-CM

## 2018-01-02 RX ORDER — DARIFENACIN 7.5 MG/1
7.5 TABLET, EXTENDED RELEASE ORAL DAILY
Qty: 30 TABLET | Refills: 0 | Status: SHIPPED | OUTPATIENT
Start: 2018-01-02 | End: 2018-10-05

## 2018-03-19 ENCOUNTER — OFFICE VISIT (OUTPATIENT)
Dept: NEUROLOGY | Facility: CLINIC | Age: 83
End: 2018-03-19
Payer: COMMERCIAL

## 2018-03-19 VITALS
DIASTOLIC BLOOD PRESSURE: 62 MMHG | HEART RATE: 71 BPM | HEIGHT: 66 IN | BODY MASS INDEX: 25.26 KG/M2 | WEIGHT: 157.19 LBS | SYSTOLIC BLOOD PRESSURE: 122 MMHG

## 2018-03-19 DIAGNOSIS — F01.50 VASCULAR DEMENTIA WITHOUT BEHAVIORAL DISTURBANCE: Primary | ICD-10-CM

## 2018-03-19 DIAGNOSIS — Z86.73 OLD CEREBROVASCULAR ACCIDENT (CVA) WITHOUT LATE EFFECT: ICD-10-CM

## 2018-03-19 DIAGNOSIS — E11.49 DM (DIABETES MELLITUS), TYPE 2 WITH NEUROLOGICAL COMPLICATIONS: ICD-10-CM

## 2018-03-19 DIAGNOSIS — I48.0 PAROXYSMAL A-FIB: ICD-10-CM

## 2018-03-19 PROCEDURE — 99401 PREV MED CNSL INDIV APPRX 15: CPT | Mod: S$GLB,,, | Performed by: PSYCHIATRY & NEUROLOGY

## 2018-03-19 PROCEDURE — 99214 OFFICE O/P EST MOD 30 MIN: CPT | Mod: 25,S$GLB,, | Performed by: PSYCHIATRY & NEUROLOGY

## 2018-03-19 PROCEDURE — 99999 PR PBB SHADOW E&M-EST. PATIENT-LVL III: CPT | Mod: PBBFAC,,, | Performed by: PSYCHIATRY & NEUROLOGY

## 2018-03-19 NOTE — PROGRESS NOTES
Subjective:       Patient ID: Sherri Bunch is a 89 y.o. male.    Chief Complaint:  Memory Loss      History of Present Illness  HPI  This is a 89-year-old Chinese male who had been seen by me for evaluation of memory difficulties. His daughter was present today and was the primary historian and . He was apparently living in [place], [place] until December 2014. He is a retired  professor at the local university. According to his spouse over the past couple of years he has been having difficulty with retention of recent information, difficulty remembering recent conversations and occasionally repeating himself. He was apparently seen by physicians in [place] and because he was having gait difficulty was prescribed levodopa which did not help very much. He did not have any problems with tremor or shuffling and his gait was described as mild clumsiness especially when getting up and walking. He had no weakness.  He is no longer on the levodopa.      When last seen by me he was started on Namenda 10 mg which was increased to twice a day.  He has a history of atrial fibrillation and is presently on anticoagulation.  He had a CT Scan of the brain that shows evidence of multiple small ischemic lesions in the right frontoparietal and bilateral basal ganglia.  This is most likely related to the history of atrial fibrillation.  An MRI scan of the brain done in 2017 showed essentially no change compared to the previous CT scan of the brain.  No acute abnormality was noted otherwise.  The patient is presently back to his usual baseline.  He is able to take care of his personal hygiene and dress himself though he has had problem with incontinence related to prostate problems but he cannot tolerate bladder medications after having been seen by urology.  He now wears depends.  His appetite and sleep is good.  His daughter does report that has been no significant progression in his memory difficulties.  He  keeps fairly active around the house.  His spouse was also present today.       Review of Systems  Review of Systems   Constitutional: Negative.    HENT: Positive for hearing loss (Mild hearing impairment).    Eyes: Negative.  Negative for visual disturbance.   Respiratory: Negative.  Negative for shortness of breath.    Cardiovascular: Negative.  Negative for chest pain and palpitations.   Gastrointestinal: Negative.    Endocrine: Negative.    Genitourinary: Negative.    Musculoskeletal: Negative.  Negative for back pain and gait problem.   Skin: Negative.    Allergic/Immunologic: Negative.    Neurological: Negative.  Negative for dizziness, tremors, seizures, syncope, speech difficulty, weakness, numbness and headaches.   Hematological: Negative.    Psychiatric/Behavioral: Positive for decreased concentration. Negative for sleep disturbance.       Objective:      Neurologic Exam      Physical Exam   Constitutional: He appears well-developed and well-nourished.   HENT:   Head: Normocephalic and atraumatic.   Right Ear: Hearing normal.   Left Ear: Hearing normal.   Eyes:   Fundus examination shows sharp disc margins.   Neck: Normal range of motion. Neck supple. Carotid bruit is not present.   Neurological: He is alert. He has normal reflexes. He displays no atrophy and no tremor. No cranial nerve deficit (Visual fields at bedside testing essentially normal.  No facial asymmetry noted with facial movements and sensory exam being normal/symmetrical.  Corneals/gag reflexes normal.  Tongue & palate movements normal.  Hearing unimpaired.  Shoulder shrug was norm) or sensory deficit (primary sensations intact). He exhibits normal muscle tone. He displays a negative Romberg sign. Coordination and gait (Gait is stable though slightly wide-based.  Minimal clumsiness of fine motor movements bilaterally) normal. He displays no Babinski's sign on the right side. He displays no Babinski's sign on the left side.   Mental status  examination: His daughter was the .  Patient is oriented to place and person and grossly to time.  He knows the month and year.  Recall of recent and past information is good.  Immediate recall is 1/3.  Mild impairment in attention span and concentration.  Judgment and insight is normal.  Processing of information is a little slow.  Language functions are intact with no evidence of aphasia or dysarthria.  Comprehension is unimpaired.  He is able to follow simple commands without difficulty.  Affect is appropriate, mood was even.  No thought disorder is noted.   Vitals reviewed.        Assessment:        1. Vascular dementia without behavioral disturbance    2. DM (diabetes mellitus), type 2 with neurological complications    3. Paroxysmal A-fib    4. Old cerebrovascular accident (CVA) without late effect             Plan:       Discussed with daughter.  He has cognitive impairment that is most likely on a vascular basis given his multiple risk factors.  He does not have any significant progression noted.  Namenda 10 mg  twice a day.  Discussed side effects including gait imbalance.  If stable to continue follow-up with his primary care physician. Reviewed immunization history.  Patient counseled about getting his immunization shots and is given a prescription for this.  Follow-up in 6 months if stable.

## 2018-03-19 NOTE — PATIENT INSTRUCTIONS
Discussed with daughter.  He has cognitive impairment that is most likely on a vascular basis given his multiple risk factors.  He does not have any significant progression noted.  Namenda 10 mg  twice a day.  Discussed side effects including gait imbalance.  If stable to continue follow-up with his primary care physician.

## 2018-04-21 ENCOUNTER — TELEPHONE (OUTPATIENT)
Dept: INTERNAL MEDICINE | Facility: CLINIC | Age: 83
End: 2018-04-21

## 2018-04-21 DIAGNOSIS — E55.9 VITAMIN D DEFICIENCY: ICD-10-CM

## 2018-04-21 DIAGNOSIS — Z00.00 WELL ADULT EXAM: Primary | ICD-10-CM

## 2018-04-21 DIAGNOSIS — N40.1 BPH WITH URINARY OBSTRUCTION: ICD-10-CM

## 2018-04-21 DIAGNOSIS — E11.42 ONYCHOMYCOSIS OF MULTIPLE TOENAILS WITH TYPE 2 DIABETES MELLITUS AND PERIPHERAL NEUROPATHY: ICD-10-CM

## 2018-04-21 DIAGNOSIS — B35.1 ONYCHOMYCOSIS OF MULTIPLE TOENAILS WITH TYPE 2 DIABETES MELLITUS AND PERIPHERAL NEUROPATHY: ICD-10-CM

## 2018-04-21 DIAGNOSIS — M85.80 OSTEOPENIA, UNSPECIFIED LOCATION: ICD-10-CM

## 2018-04-21 DIAGNOSIS — E11.49 DM (DIABETES MELLITUS), TYPE 2 WITH NEUROLOGICAL COMPLICATIONS: ICD-10-CM

## 2018-04-21 DIAGNOSIS — N39.41 URGENCY INCONTINENCE: ICD-10-CM

## 2018-04-21 DIAGNOSIS — Z86.73 OLD CEREBROVASCULAR ACCIDENT (CVA) WITHOUT LATE EFFECT: ICD-10-CM

## 2018-04-21 DIAGNOSIS — E11.69 ONYCHOMYCOSIS OF MULTIPLE TOENAILS WITH TYPE 2 DIABETES MELLITUS AND PERIPHERAL NEUROPATHY: ICD-10-CM

## 2018-04-21 DIAGNOSIS — J18.9 COMMUNITY ACQUIRED PNEUMONIA, UNSPECIFIED LATERALITY: ICD-10-CM

## 2018-04-21 DIAGNOSIS — F01.50 VASCULAR DEMENTIA WITHOUT BEHAVIORAL DISTURBANCE: ICD-10-CM

## 2018-04-21 DIAGNOSIS — I10 ESSENTIAL HYPERTENSION: ICD-10-CM

## 2018-04-21 DIAGNOSIS — I48.0 PAROXYSMAL A-FIB: ICD-10-CM

## 2018-04-21 DIAGNOSIS — N13.8 BPH WITH URINARY OBSTRUCTION: ICD-10-CM

## 2018-04-21 NOTE — TELEPHONE ENCOUNTER
----- Message from Gunjan Williamson sent at 4/20/2018  3:40 PM CDT -----  Doctor appointment and lab have been scheduled.  Please link lab orders to the lab appointment.  Date of doctor appointment:  8/24  Physical or EP:Physical   Date of lab appointment:  8/18  Comments:

## 2018-05-07 DIAGNOSIS — R05.9 COUGH: ICD-10-CM

## 2018-05-19 ENCOUNTER — PATIENT MESSAGE (OUTPATIENT)
Dept: NEUROLOGY | Facility: CLINIC | Age: 83
End: 2018-05-19

## 2018-05-21 RX ORDER — TETANUS TOXOID, REDUCED DIPHTHERIA TOXOID AND ACELLULAR PERTUSSIS VACCINE, ADSORBED 5; 2.5; 8; 8; 2.5 [IU]/.5ML; [IU]/.5ML; UG/.5ML; UG/.5ML; UG/.5ML
SUSPENSION INTRAMUSCULAR
COMMUNITY
Start: 2018-03-19 | End: 2018-10-05 | Stop reason: ALTCHOICE

## 2018-05-21 RX ORDER — ESCITALOPRAM OXALATE 5 MG/1
5 TABLET ORAL DAILY
Qty: 30 TABLET | Refills: 11 | Status: SHIPPED | OUTPATIENT
Start: 2018-05-21 | End: 2019-05-20

## 2018-07-05 ENCOUNTER — OFFICE VISIT (OUTPATIENT)
Dept: INTERNAL MEDICINE | Facility: CLINIC | Age: 83
End: 2018-07-05
Payer: COMMERCIAL

## 2018-07-05 VITALS
RESPIRATION RATE: 18 BRPM | OXYGEN SATURATION: 95 % | TEMPERATURE: 98 F | SYSTOLIC BLOOD PRESSURE: 122 MMHG | HEIGHT: 66 IN | DIASTOLIC BLOOD PRESSURE: 68 MMHG | BODY MASS INDEX: 25.08 KG/M2 | HEART RATE: 71 BPM | WEIGHT: 156.06 LBS

## 2018-07-05 DIAGNOSIS — J01.40 ACUTE PANSINUSITIS, RECURRENCE NOT SPECIFIED: Primary | ICD-10-CM

## 2018-07-05 PROCEDURE — 99999 PR PBB SHADOW E&M-EST. PATIENT-LVL III: CPT | Mod: PBBFAC,,, | Performed by: FAMILY MEDICINE

## 2018-07-05 PROCEDURE — 99214 OFFICE O/P EST MOD 30 MIN: CPT | Mod: S$GLB,,, | Performed by: FAMILY MEDICINE

## 2018-07-05 RX ORDER — AZITHROMYCIN 250 MG/1
TABLET, FILM COATED ORAL
Qty: 6 TABLET | Refills: 0 | Status: SHIPPED | OUTPATIENT
Start: 2018-07-05 | End: 2018-08-27 | Stop reason: ALTCHOICE

## 2018-07-05 NOTE — PROGRESS NOTES
Subjective:       Patient ID: Sherri Bunch is a 89 y.o. male.    Chief Complaint: Cough (x 3 days, a lot of yellow thick phlegm) and Fever (last night, was sweating)    HPI 89-year-old Chinese male presents to clinic today accompanied by his family secondary to a complaint of increased fatigue, chest congestion, cough productive of yellow sputum, weakness, and subjective fever for the past week.  They have attempted increased hydration with Pedialyte with mild improvement of symptoms.  Review of Systems   Constitutional: Positive for fatigue and fever. Negative for appetite change and chills.   HENT: Negative for congestion, ear pain, hearing loss, postnasal drip, rhinorrhea, sinus pressure, sore throat and tinnitus.    Eyes: Negative for redness, itching and visual disturbance.   Respiratory: Positive for cough (Yellowish sputum) and chest tightness. Negative for shortness of breath.    Cardiovascular: Negative for chest pain and palpitations.   Gastrointestinal: Negative for abdominal pain, constipation, diarrhea, nausea and vomiting.   Genitourinary: Negative for decreased urine volume, difficulty urinating, dysuria, frequency, hematuria and urgency.   Musculoskeletal: Negative for back pain, myalgias, neck pain and neck stiffness.   Skin: Negative for rash.   Neurological: Positive for weakness. Negative for dizziness, light-headedness and headaches.   Psychiatric/Behavioral: Negative.        Objective:      Physical Exam   Constitutional: He is oriented to person, place, and time. He appears well-developed and well-nourished. No distress.   HENT:   Head: Normocephalic and atraumatic.   Right Ear: External ear normal. A middle ear effusion is present.   Left Ear: External ear normal. A middle ear effusion is present.   Nose: Mucosal edema and rhinorrhea present. No nose lacerations, sinus tenderness, nasal deformity, septal deviation or nasal septal hematoma. No epistaxis.  No foreign bodies. Right sinus exhibits  maxillary sinus tenderness and frontal sinus tenderness. Left sinus exhibits maxillary sinus tenderness and frontal sinus tenderness.   Mouth/Throat: Oropharynx is clear and moist. No oropharyngeal exudate.   Eyes: Conjunctivae and EOM are normal. Pupils are equal, round, and reactive to light. Right eye exhibits no discharge. Left eye exhibits no discharge. No scleral icterus.   Neck: Normal range of motion. Neck supple. No JVD present. No tracheal deviation present. No thyromegaly present.   Cardiovascular: Normal rate, regular rhythm, normal heart sounds and intact distal pulses.  Exam reveals no gallop and no friction rub.    No murmur heard.  Pulmonary/Chest: Effort normal and breath sounds normal. No stridor. No respiratory distress. He has no wheezes. He has no rales.   Abdominal: Soft. Bowel sounds are normal. He exhibits no distension and no mass. There is no tenderness. There is no rebound and no guarding.   Musculoskeletal: Normal range of motion. He exhibits no edema or tenderness.   Lymphadenopathy:     He has no cervical adenopathy.   Neurological: He is alert and oriented to person, place, and time.   Skin: Skin is warm and dry. No rash noted. He is not diaphoretic. No erythema. No pallor.   Psychiatric: He has a normal mood and affect. His behavior is normal. Judgment and thought content normal.   Nursing note and vitals reviewed.      Assessment:       1. Acute pansinusitis, recurrence not specified        Plan:       Acute pansinusitis, recurrence not specified  -     azithromycin (ZITHROMAX Z-BENI) 250 MG tablet; Take 2 tablets on day 1, then 1 tablet on days 2-5.  Dispense: 6 tablet; Refill: 0      Encourage hydration.  Over-the-counter Claritin nightly.  Return to clinic as needed if symptoms persist or worsen.

## 2018-08-18 ENCOUNTER — LAB VISIT (OUTPATIENT)
Dept: LAB | Facility: HOSPITAL | Age: 83
End: 2018-08-18
Attending: FAMILY MEDICINE
Payer: COMMERCIAL

## 2018-08-18 DIAGNOSIS — B35.1 ONYCHOMYCOSIS OF MULTIPLE TOENAILS WITH TYPE 2 DIABETES MELLITUS AND PERIPHERAL NEUROPATHY: ICD-10-CM

## 2018-08-18 DIAGNOSIS — J18.9 COMMUNITY ACQUIRED PNEUMONIA, UNSPECIFIED LATERALITY: ICD-10-CM

## 2018-08-18 DIAGNOSIS — Z00.00 WELL ADULT EXAM: ICD-10-CM

## 2018-08-18 DIAGNOSIS — E11.42 ONYCHOMYCOSIS OF MULTIPLE TOENAILS WITH TYPE 2 DIABETES MELLITUS AND PERIPHERAL NEUROPATHY: ICD-10-CM

## 2018-08-18 DIAGNOSIS — E11.49 DM (DIABETES MELLITUS), TYPE 2 WITH NEUROLOGICAL COMPLICATIONS: ICD-10-CM

## 2018-08-18 DIAGNOSIS — E55.9 VITAMIN D DEFICIENCY: ICD-10-CM

## 2018-08-18 DIAGNOSIS — F01.50 VASCULAR DEMENTIA WITHOUT BEHAVIORAL DISTURBANCE: ICD-10-CM

## 2018-08-18 DIAGNOSIS — E11.69 ONYCHOMYCOSIS OF MULTIPLE TOENAILS WITH TYPE 2 DIABETES MELLITUS AND PERIPHERAL NEUROPATHY: ICD-10-CM

## 2018-08-18 DIAGNOSIS — N39.41 URGENCY INCONTINENCE: ICD-10-CM

## 2018-08-18 DIAGNOSIS — I10 ESSENTIAL HYPERTENSION: ICD-10-CM

## 2018-08-18 DIAGNOSIS — M85.80 OSTEOPENIA, UNSPECIFIED LOCATION: ICD-10-CM

## 2018-08-18 DIAGNOSIS — I48.0 PAROXYSMAL A-FIB: ICD-10-CM

## 2018-08-18 DIAGNOSIS — N40.1 BPH WITH URINARY OBSTRUCTION: ICD-10-CM

## 2018-08-18 DIAGNOSIS — N13.8 BPH WITH URINARY OBSTRUCTION: ICD-10-CM

## 2018-08-18 DIAGNOSIS — Z86.73 OLD CEREBROVASCULAR ACCIDENT (CVA) WITHOUT LATE EFFECT: ICD-10-CM

## 2018-08-18 LAB
25(OH)D3+25(OH)D2 SERPL-MCNC: 38 NG/ML
ALBUMIN SERPL BCP-MCNC: 3.4 G/DL
ALP SERPL-CCNC: 87 U/L
ALT SERPL W/O P-5'-P-CCNC: 15 U/L
ANION GAP SERPL CALC-SCNC: 10 MMOL/L
AST SERPL-CCNC: 23 U/L
BASOPHILS # BLD AUTO: 0.05 K/UL
BASOPHILS NFR BLD: 0.9 %
BILIRUB SERPL-MCNC: 1.1 MG/DL
BUN SERPL-MCNC: 19 MG/DL
CALCIUM SERPL-MCNC: 9.1 MG/DL
CHLORIDE SERPL-SCNC: 105 MMOL/L
CHOLEST SERPL-MCNC: 184 MG/DL
CHOLEST/HDLC SERPL: 4.8 {RATIO}
CO2 SERPL-SCNC: 22 MMOL/L
CREAT SERPL-MCNC: 1 MG/DL
DIFFERENTIAL METHOD: NORMAL
EOSINOPHIL # BLD AUTO: 0.1 K/UL
EOSINOPHIL NFR BLD: 2.5 %
ERYTHROCYTE [DISTWIDTH] IN BLOOD BY AUTOMATED COUNT: 13.8 %
EST. GFR  (AFRICAN AMERICAN): >60 ML/MIN/1.73 M^2
EST. GFR  (NON AFRICAN AMERICAN): >60 ML/MIN/1.73 M^2
ESTIMATED AVG GLUCOSE: 183 MG/DL
GLUCOSE SERPL-MCNC: 161 MG/DL
HBA1C MFR BLD HPLC: 8 %
HCT VFR BLD AUTO: 48.7 %
HDLC SERPL-MCNC: 38 MG/DL
HDLC SERPL: 20.7 %
HGB BLD-MCNC: 15.6 G/DL
IMM GRANULOCYTES # BLD AUTO: 0.02 K/UL
IMM GRANULOCYTES NFR BLD AUTO: 0.4 %
LDLC SERPL CALC-MCNC: 106 MG/DL
LYMPHOCYTES # BLD AUTO: 1.7 K/UL
LYMPHOCYTES NFR BLD: 32.3 %
MCH RBC QN AUTO: 29.3 PG
MCHC RBC AUTO-ENTMCNC: 32 G/DL
MCV RBC AUTO: 92 FL
MONOCYTES # BLD AUTO: 0.5 K/UL
MONOCYTES NFR BLD: 8.7 %
NEUTROPHILS # BLD AUTO: 2.9 K/UL
NEUTROPHILS NFR BLD: 55.2 %
NONHDLC SERPL-MCNC: 146 MG/DL
NRBC BLD-RTO: 0 /100 WBC
PLATELET # BLD AUTO: 229 K/UL
PMV BLD AUTO: 10.7 FL
POTASSIUM SERPL-SCNC: 4 MMOL/L
PROT SERPL-MCNC: 6.7 G/DL
RBC # BLD AUTO: 5.32 M/UL
SODIUM SERPL-SCNC: 137 MMOL/L
T4 FREE SERPL-MCNC: 0.94 NG/DL
TRIGL SERPL-MCNC: 200 MG/DL
TSH SERPL DL<=0.005 MIU/L-ACNC: 5.71 UIU/ML
WBC # BLD AUTO: 5.3 K/UL

## 2018-08-18 PROCEDURE — 84443 ASSAY THYROID STIM HORMONE: CPT

## 2018-08-18 PROCEDURE — 36415 COLL VENOUS BLD VENIPUNCTURE: CPT | Mod: PO

## 2018-08-18 PROCEDURE — 85025 COMPLETE CBC W/AUTO DIFF WBC: CPT

## 2018-08-18 PROCEDURE — 84439 ASSAY OF FREE THYROXINE: CPT

## 2018-08-18 PROCEDURE — 80061 LIPID PANEL: CPT

## 2018-08-18 PROCEDURE — 83036 HEMOGLOBIN GLYCOSYLATED A1C: CPT

## 2018-08-18 PROCEDURE — 80053 COMPREHEN METABOLIC PANEL: CPT

## 2018-08-18 PROCEDURE — 82306 VITAMIN D 25 HYDROXY: CPT

## 2018-08-27 ENCOUNTER — OFFICE VISIT (OUTPATIENT)
Dept: INTERNAL MEDICINE | Facility: CLINIC | Age: 83
End: 2018-08-27
Payer: COMMERCIAL

## 2018-08-27 ENCOUNTER — DOCUMENTATION ONLY (OUTPATIENT)
Dept: INTERNAL MEDICINE | Facility: CLINIC | Age: 83
End: 2018-08-27

## 2018-08-27 VITALS
WEIGHT: 156.31 LBS | DIASTOLIC BLOOD PRESSURE: 60 MMHG | SYSTOLIC BLOOD PRESSURE: 118 MMHG | OXYGEN SATURATION: 98 % | RESPIRATION RATE: 18 BRPM | BODY MASS INDEX: 25.12 KG/M2 | TEMPERATURE: 98 F | HEART RATE: 65 BPM | HEIGHT: 66 IN

## 2018-08-27 DIAGNOSIS — R13.10 DYSPHAGIA, UNSPECIFIED TYPE: ICD-10-CM

## 2018-08-27 DIAGNOSIS — M85.80 OSTEOPENIA, UNSPECIFIED LOCATION: ICD-10-CM

## 2018-08-27 DIAGNOSIS — E11.69 ONYCHOMYCOSIS OF MULTIPLE TOENAILS WITH TYPE 2 DIABETES MELLITUS AND PERIPHERAL NEUROPATHY: ICD-10-CM

## 2018-08-27 DIAGNOSIS — I48.0 PAROXYSMAL A-FIB: ICD-10-CM

## 2018-08-27 DIAGNOSIS — E11.42 ONYCHOMYCOSIS OF MULTIPLE TOENAILS WITH TYPE 2 DIABETES MELLITUS AND PERIPHERAL NEUROPATHY: ICD-10-CM

## 2018-08-27 DIAGNOSIS — I10 ESSENTIAL HYPERTENSION: ICD-10-CM

## 2018-08-27 DIAGNOSIS — N13.8 BPH WITH URINARY OBSTRUCTION: ICD-10-CM

## 2018-08-27 DIAGNOSIS — Z12.11 COLON CANCER SCREENING: ICD-10-CM

## 2018-08-27 DIAGNOSIS — Z00.00 WELL ADULT EXAM: Primary | ICD-10-CM

## 2018-08-27 DIAGNOSIS — B35.1 ONYCHOMYCOSIS OF MULTIPLE TOENAILS WITH TYPE 2 DIABETES MELLITUS AND PERIPHERAL NEUROPATHY: ICD-10-CM

## 2018-08-27 DIAGNOSIS — Z86.73 OLD CEREBROVASCULAR ACCIDENT (CVA) WITHOUT LATE EFFECT: ICD-10-CM

## 2018-08-27 DIAGNOSIS — E11.49 DM (DIABETES MELLITUS), TYPE 2 WITH NEUROLOGICAL COMPLICATIONS: ICD-10-CM

## 2018-08-27 DIAGNOSIS — E55.9 VITAMIN D DEFICIENCY: ICD-10-CM

## 2018-08-27 DIAGNOSIS — F01.50 VASCULAR DEMENTIA WITHOUT BEHAVIORAL DISTURBANCE: ICD-10-CM

## 2018-08-27 DIAGNOSIS — N40.1 BPH WITH URINARY OBSTRUCTION: ICD-10-CM

## 2018-08-27 PROCEDURE — 99999 PR PBB SHADOW E&M-EST. PATIENT-LVL IV: CPT | Mod: PBBFAC,,, | Performed by: FAMILY MEDICINE

## 2018-08-27 PROCEDURE — 99397 PER PM REEVAL EST PAT 65+ YR: CPT | Mod: S$GLB,,, | Performed by: FAMILY MEDICINE

## 2018-08-27 NOTE — PROGRESS NOTES
Subjective:       Patient ID: Sherri Bunch is a 89 y.o. male.    Chief Complaint: Annual Exam    HPI 89-year-old Chinese male presents to clinic today accompanied by his daughter for annual physical exam.  He continues to be followed by Cardiology at [facility] for continued treatment of atrial fibrillation which remains stable on Pradaxa, Multaq, hydrochlorothiazide, and metoprolol.  He continues to have diet-controlled type 2 diabetes with recent elevation in A1c to 8.0 secondary to recent change in diet.  The patient's daughter reports that she has been on able to cook and they have been eating more take out over the past year.  She is going to return to cooking meals.  He continues to be followed by Neurology secondary to mild cognitive impairment which remains stable on Namenda.  He continues to see Podiatry secondary to peripheral neuropathy and onychomycosis which remained stable.  The daughter reports continued concerns secondary to frequent coughing and occasional choking spells with eating.  She is concerned that the patient is having swallowing difficulties and would like to get further evaluation.  He has no significant past surgical history.  He has a family history of heart disease.  He is up-to-date with all vaccinations.  Finally, the patient's daughter requests that the patient have a fit test for further evaluation of colon cancer.  I have explained that the test is not indicated; however, she was still like the test performed.  Review of Systems   Constitutional: Negative for appetite change, chills, fatigue and fever.   HENT: Positive for trouble swallowing. Negative for congestion, ear pain, hearing loss, postnasal drip, rhinorrhea, sinus pressure, sore throat and tinnitus.    Eyes: Negative for redness, itching and visual disturbance.   Respiratory: Negative for cough, chest tightness and shortness of breath.    Cardiovascular: Negative for chest pain and palpitations.   Gastrointestinal:  Negative for abdominal pain, constipation, diarrhea, nausea and vomiting.   Genitourinary: Negative for decreased urine volume, difficulty urinating, dysuria, frequency, hematuria and urgency.   Musculoskeletal: Negative for back pain, myalgias, neck pain and neck stiffness.   Skin: Negative for rash.   Neurological: Negative for dizziness, light-headedness and headaches.   Psychiatric/Behavioral: Positive for confusion.       Objective:      Physical Exam   Constitutional: He is oriented to person, place, and time. He appears well-developed and well-nourished. No distress.   HENT:   Head: Normocephalic and atraumatic.   Right Ear: External ear normal.   Left Ear: External ear normal.   Nose: Nose normal.   Mouth/Throat: Oropharynx is clear and moist. No oropharyngeal exudate.   Eyes: Conjunctivae and EOM are normal. Pupils are equal, round, and reactive to light. Right eye exhibits no discharge. Left eye exhibits no discharge. No scleral icterus.   Neck: Normal range of motion. Neck supple. No JVD present. No tracheal deviation present. No thyromegaly present.   Cardiovascular: Normal rate, regular rhythm, normal heart sounds and intact distal pulses. Exam reveals no gallop and no friction rub.   No murmur heard.  Pulses:       Dorsalis pedis pulses are 2+ on the right side, and 2+ on the left side.        Posterior tibial pulses are 2+ on the right side, and 2+ on the left side.   Pulmonary/Chest: Effort normal and breath sounds normal. No stridor. No respiratory distress. He has no wheezes. He has no rales.   Abdominal: Soft. Bowel sounds are normal. He exhibits no distension and no mass. There is no tenderness. There is no rebound and no guarding.   Musculoskeletal: Normal range of motion. He exhibits no edema or tenderness.        Right foot: There is deformity (Onychomycosis). There is normal range of motion.        Left foot: There is deformity ( onychomycosis). There is normal range of motion.   Feet:    Right Foot:   Protective Sensation: 10 sites tested. 10 sites sensed.   Skin Integrity: Negative for ulcer, blister, skin breakdown, erythema, warmth, callus or dry skin.   Left Foot:   Skin Integrity: Negative for ulcer, blister, skin breakdown, erythema, warmth, callus or dry skin.   Lymphadenopathy:     He has no cervical adenopathy.   Neurological: He is alert and oriented to person, place, and time.   Skin: Skin is warm and dry. No rash noted. He is not diaphoretic. No erythema. No pallor.   Psychiatric: He has a normal mood and affect. His behavior is normal. Judgment and thought content normal.   Nursing note and vitals reviewed.      Assessment:       1. Well adult exam    2. DM (diabetes mellitus), type 2 with neurological complications    3. Essential hypertension    4. Paroxysmal A-fib    5. Vascular dementia without behavioral disturbance    6. BPH with urinary obstruction    7. Old cerebrovascular accident (CVA) without late effect    8. Onychomycosis of multiple toenails with type 2 diabetes mellitus and peripheral neuropathy    9. Osteopenia, unspecified location    10. Vitamin D deficiency    11. Dysphagia, unspecified type    12. Colon cancer screening        Plan:       1.  Labs have been reviewed and were overall within normal limits.  2.  Diabetes is well controlled with diet.  The patient's daughter reports that she will change the patient's diet with her own cooking rather than take out.  3.  Continue follow-up with Cardiology and continue Pradaxa, metoprolol, and hydrochlorothiazide as prescribed.  Hypertension and atrial fibrillation are well controlled.  4.  Continue follow-up with Neurology as scheduled.  Continue Namenda as prescribed.  5.  Continue Myrbetriq as prescribed.  BPH is stable.  6.  Continue follow-up with Podiatry as scheduled.  Onychomycosis is stable.  7.  Continue vitamin-D 2000 units daily.  8.  Swallow study.  9.  Fit test per patient's family request.  10.  Return to  clinic as needed or in 1 year for annual exam.

## 2018-08-29 ENCOUNTER — TELEPHONE (OUTPATIENT)
Dept: SPEECH THERAPY | Facility: HOSPITAL | Age: 83
End: 2018-08-29

## 2018-09-04 ENCOUNTER — LAB VISIT (OUTPATIENT)
Dept: LAB | Facility: HOSPITAL | Age: 83
End: 2018-09-04
Attending: FAMILY MEDICINE
Payer: COMMERCIAL

## 2018-09-04 DIAGNOSIS — Z12.11 COLON CANCER SCREENING: ICD-10-CM

## 2018-09-04 PROCEDURE — 82274 ASSAY TEST FOR BLOOD FECAL: CPT

## 2018-09-05 LAB — HEMOCCULT STL QL IA: NEGATIVE

## 2018-09-21 DIAGNOSIS — G31.84 MCI (MILD COGNITIVE IMPAIRMENT) WITH MEMORY LOSS: ICD-10-CM

## 2018-09-21 RX ORDER — MEMANTINE HYDROCHLORIDE 10 MG/1
TABLET ORAL
Qty: 60 TABLET | Refills: 10 | Status: SHIPPED | OUTPATIENT
Start: 2018-09-21 | End: 2019-05-27 | Stop reason: SDUPTHER

## 2018-10-01 ENCOUNTER — OFFICE VISIT (OUTPATIENT)
Dept: OPTOMETRY | Facility: CLINIC | Age: 83
End: 2018-10-01
Payer: COMMERCIAL

## 2018-10-01 DIAGNOSIS — H25.13 NUCLEAR SCLEROSIS, BILATERAL: ICD-10-CM

## 2018-10-01 DIAGNOSIS — E11.9 TYPE 2 DIABETES MELLITUS WITHOUT RETINOPATHY: Primary | ICD-10-CM

## 2018-10-01 DIAGNOSIS — H52.4 PRESBYOPIA: ICD-10-CM

## 2018-10-01 DIAGNOSIS — Z13.5 SCREENING FOR GLAUCOMA: ICD-10-CM

## 2018-10-01 PROCEDURE — 99999 PR PBB SHADOW E&M-EST. PATIENT-LVL II: CPT | Mod: PBBFAC,,, | Performed by: OPTOMETRIST

## 2018-10-01 PROCEDURE — 92015 DETERMINE REFRACTIVE STATE: CPT | Mod: S$GLB,,, | Performed by: OPTOMETRIST

## 2018-10-01 PROCEDURE — 92014 COMPRE OPH EXAM EST PT 1/>: CPT | Mod: S$GLB,,, | Performed by: OPTOMETRIST

## 2018-10-05 ENCOUNTER — LAB VISIT (OUTPATIENT)
Dept: LAB | Facility: HOSPITAL | Age: 83
End: 2018-10-05
Attending: FAMILY MEDICINE
Payer: COMMERCIAL

## 2018-10-05 ENCOUNTER — OFFICE VISIT (OUTPATIENT)
Dept: INTERNAL MEDICINE | Facility: CLINIC | Age: 83
End: 2018-10-05
Payer: COMMERCIAL

## 2018-10-05 VITALS
HEIGHT: 66 IN | BODY MASS INDEX: 25.61 KG/M2 | TEMPERATURE: 99 F | WEIGHT: 159.38 LBS | SYSTOLIC BLOOD PRESSURE: 148 MMHG | HEART RATE: 76 BPM | RESPIRATION RATE: 16 BRPM | DIASTOLIC BLOOD PRESSURE: 70 MMHG

## 2018-10-05 DIAGNOSIS — Z23 NEED FOR PROPHYLACTIC VACCINATION AND INOCULATION AGAINST INFLUENZA: ICD-10-CM

## 2018-10-05 DIAGNOSIS — M10.9 ACUTE GOUT OF RIGHT FOOT, UNSPECIFIED CAUSE: Primary | ICD-10-CM

## 2018-10-05 DIAGNOSIS — M10.9 ACUTE GOUT OF RIGHT FOOT, UNSPECIFIED CAUSE: ICD-10-CM

## 2018-10-05 LAB — URATE SERPL-MCNC: 5.9 MG/DL

## 2018-10-05 PROCEDURE — 99999 PR PBB SHADOW E&M-EST. PATIENT-LVL III: CPT | Mod: PBBFAC,,, | Performed by: FAMILY MEDICINE

## 2018-10-05 PROCEDURE — 90662 IIV NO PRSV INCREASED AG IM: CPT | Mod: S$GLB,,, | Performed by: FAMILY MEDICINE

## 2018-10-05 PROCEDURE — 99214 OFFICE O/P EST MOD 30 MIN: CPT | Mod: 25,S$GLB,, | Performed by: FAMILY MEDICINE

## 2018-10-05 PROCEDURE — 90471 IMMUNIZATION ADMIN: CPT | Mod: S$GLB,,, | Performed by: FAMILY MEDICINE

## 2018-10-05 PROCEDURE — 36415 COLL VENOUS BLD VENIPUNCTURE: CPT | Mod: PO

## 2018-10-05 PROCEDURE — 1100F PTFALLS ASSESS-DOCD GE2>/YR: CPT | Mod: CPTII,S$GLB,, | Performed by: FAMILY MEDICINE

## 2018-10-05 PROCEDURE — 3288F FALL RISK ASSESSMENT DOCD: CPT | Mod: CPTII,S$GLB,, | Performed by: FAMILY MEDICINE

## 2018-10-05 PROCEDURE — 84550 ASSAY OF BLOOD/URIC ACID: CPT

## 2018-10-05 RX ORDER — MELOXICAM 15 MG/1
15 TABLET ORAL DAILY
Qty: 10 TABLET | Refills: 0 | Status: SHIPPED | OUTPATIENT
Start: 2018-10-05 | End: 2018-10-15

## 2018-10-05 NOTE — PROGRESS NOTES
Subjective:       Patient ID: Sherri Bunch is a 89 y.o. male.    Chief Complaint: Foot Swelling and Foot Pain    HPI 89-year-old male presents to clinic today accompanied by his wife and daughter secondary to a complaint of on and off right foot pain and swelling for the past week.  They have been using an over-the-counter topical cream with mild relief of symptoms.  Review of Systems   Constitutional: Negative for appetite change, chills, fatigue and fever.   HENT: Negative for congestion, ear pain, hearing loss, postnasal drip, rhinorrhea, sinus pressure, sore throat and tinnitus.    Eyes: Negative for redness, itching and visual disturbance.   Respiratory: Negative for cough, chest tightness and shortness of breath.    Cardiovascular: Negative for chest pain and palpitations.   Gastrointestinal: Negative for abdominal pain, constipation, diarrhea, nausea and vomiting.   Genitourinary: Negative for decreased urine volume, difficulty urinating, dysuria, frequency, hematuria and urgency.   Musculoskeletal: Positive for arthralgias (Right foot) and joint swelling ( right foot). Negative for back pain, myalgias, neck pain and neck stiffness.   Skin: Negative for rash.   Neurological: Negative for dizziness, light-headedness and headaches.   Psychiatric/Behavioral: Negative.        Objective:      Physical Exam   Constitutional: He is oriented to person, place, and time. He appears well-developed and well-nourished. No distress.   HENT:   Head: Normocephalic and atraumatic.   Right Ear: External ear normal.   Left Ear: External ear normal.   Nose: Nose normal.   Mouth/Throat: Oropharynx is clear and moist. No oropharyngeal exudate.   Eyes: Conjunctivae and EOM are normal. Pupils are equal, round, and reactive to light. Right eye exhibits no discharge. Left eye exhibits no discharge. No scleral icterus.   Neck: Normal range of motion.   Musculoskeletal: Normal range of motion. He exhibits no edema.        Right foot: There  is tenderness and swelling (Trace edema).   Neurological: He is alert and oriented to person, place, and time.   Skin: Skin is warm and dry. No rash noted. He is not diaphoretic. No erythema. No pallor.   Psychiatric: He has a normal mood and affect. His behavior is normal. Judgment and thought content normal.   Nursing note and vitals reviewed.      Assessment:       1. Acute gout of right foot, unspecified cause    2. Need for prophylactic vaccination and inoculation against influenza        Plan:       Acute gout of right foot, unspecified cause  -     Uric acid; Future; Expected date: 10/05/2018  -     meloxicam (MOBIC) 15 MG tablet; Take 1 tablet (15 mg total) by mouth once daily. for 10 days  Dispense: 10 tablet; Refill: 0    Need for prophylactic vaccination and inoculation against influenza  -     Influenza - High Dose (65+) (PF) (IM)      Return to clinic as needed if symptoms persist or worsen.

## 2018-10-08 ENCOUNTER — OFFICE VISIT (OUTPATIENT)
Dept: NEUROLOGY | Facility: CLINIC | Age: 83
End: 2018-10-08
Payer: COMMERCIAL

## 2018-10-08 VITALS
BODY MASS INDEX: 25.51 KG/M2 | WEIGHT: 158.75 LBS | DIASTOLIC BLOOD PRESSURE: 68 MMHG | HEIGHT: 66 IN | HEART RATE: 62 BPM | SYSTOLIC BLOOD PRESSURE: 148 MMHG

## 2018-10-08 DIAGNOSIS — F01.50 VASCULAR DEMENTIA WITHOUT BEHAVIORAL DISTURBANCE: Primary | ICD-10-CM

## 2018-10-08 DIAGNOSIS — Z86.73 OLD CEREBROVASCULAR ACCIDENT (CVA) WITHOUT LATE EFFECT: ICD-10-CM

## 2018-10-08 DIAGNOSIS — I48.0 PAROXYSMAL A-FIB: ICD-10-CM

## 2018-10-08 DIAGNOSIS — E11.49 DM (DIABETES MELLITUS), TYPE 2 WITH NEUROLOGICAL COMPLICATIONS: ICD-10-CM

## 2018-10-08 PROCEDURE — 99214 OFFICE O/P EST MOD 30 MIN: CPT | Mod: S$GLB,,, | Performed by: PSYCHIATRY & NEUROLOGY

## 2018-10-08 PROCEDURE — 1100F PTFALLS ASSESS-DOCD GE2>/YR: CPT | Mod: CPTII,S$GLB,, | Performed by: PSYCHIATRY & NEUROLOGY

## 2018-10-08 PROCEDURE — 99999 PR PBB SHADOW E&M-EST. PATIENT-LVL III: CPT | Mod: PBBFAC,,, | Performed by: PSYCHIATRY & NEUROLOGY

## 2018-10-08 PROCEDURE — 3288F FALL RISK ASSESSMENT DOCD: CPT | Mod: CPTII,S$GLB,, | Performed by: PSYCHIATRY & NEUROLOGY

## 2018-10-08 NOTE — PATIENT INSTRUCTIONS
Discussed with daughter.  He has cognitive impairment that is most likely on a vascular basis given his multiple risk factors.  He does not have any significant progression noted.  Namenda 10 mg  twice a day.  Discussed side effects including gait imbalance.  It is advised that he would in the mall for about 20-30 minutes every day this might help his overall functioning as he spends most of his time at usually watching TV or asleep.  Advised to continue with OTC B12 supplementation.

## 2018-10-08 NOTE — PROGRESS NOTES
Subjective:       Patient ID: Sherri Bunch is a 89 y.o. male.    Chief Complaint:  Memory Loss      History of Present Illness  HPI  This is a 89-year-old Chinese male who had been seen by me for evaluation of memory difficulties. His daughter was present today and was the primary historian and . He was apparently living in [place], [place] until December 2014. 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  He has had history memory difficulties since 02/14. He was apparently seen by physicians in [place] and because he was having gait difficulty was prescribed levodopa which did not help very much. He did not have any problems with tremor or shuffling and his gait was described as mild clumsiness especially when getting up and walking. He had no weakness.  He is no longer on the levodopa.      When last seen by me he was started on Namenda 10 mg which was increased to twice a day.  He has a history of atrial fibrillation and is presently on anticoagulation.  He had a CT Scan of the brain that shows evidence of multiple small ischemic lesions in the right frontoparietal and bilateral basal ganglia.  This is most likely related to the history of atrial fibrillation.  An MRI scan of the brain done in 2017 showed essentially no change compared to the previous CT scan of the brain.  No acute abnormality was noted otherwise. He is able to take care of his personal hygiene and dress himself though he has had problem with incontinence related to prostate problems but he cannot tolerate bladder medications after having been seen by urology.  He now wears depends.  His appetite and sleep is good.  His daughter does report that has been no significant progression in his memory difficulties.  His spouse was also present today.       Review of Systems  Review of Systems   Constitutional: Negative.    HENT: Positive for hearing loss (Mild hearing impairment).    Eyes: Negative.  Negative for visual  disturbance.   Respiratory: Negative.  Negative for shortness of breath.    Cardiovascular: Negative.  Negative for chest pain and palpitations.   Gastrointestinal: Negative.    Endocrine: Negative.    Genitourinary: Negative.    Musculoskeletal: Negative.  Negative for back pain and gait problem.   Skin: Negative.    Allergic/Immunologic: Negative.    Neurological: Negative.  Negative for dizziness, tremors, seizures, syncope, speech difficulty, weakness, numbness and headaches.   Hematological: Negative.    Psychiatric/Behavioral: Positive for decreased concentration. Negative for sleep disturbance.       Objective:      Neurologic Exam      Physical Exam   Constitutional: He appears well-developed and well-nourished.   HENT:   Head: Normocephalic and atraumatic.   Right Ear: Hearing normal.   Left Ear: Hearing normal.   Eyes:   Fundus examination shows sharp disc margins.   Neck: Normal range of motion. Neck supple. Carotid bruit is not present.   Neurological: He is alert. He has normal reflexes. He displays no atrophy and no tremor. No cranial nerve deficit (Visual fields at bedside testing essentially normal.  No facial asymmetry noted with facial movements and sensory exam being normal/symmetrical.  Corneals/gag reflexes normal.  Tongue & palate movements normal.  Hearing unimpaired.  Shoulder shrug was norm) or sensory deficit (primary sensations intact). He exhibits normal muscle tone. He displays a negative Romberg sign. Coordination and gait (Gait is stable though slightly wide-based.  Minimal clumsiness of fine motor movements bilaterally) normal. He displays no Babinski's sign on the right side. He displays no Babinski's sign on the left side.   Mental status examination: His daughter was the .  Patient is oriented to place and person and grossly to time.  He knows the month and year.  Recall of recent and past information is good.  Immediate recall is 1/3.  Mild impairment in attention span  and concentration.  Judgment and insight is normal.  Processing of information is a little slow.  Language functions are intact with no evidence of aphasia or dysarthria.  Comprehension is unimpaired.  He is able to follow simple commands without difficulty.  Affect is appropriate, mood was even.  No thought disorder is noted.   Vitals reviewed.        Assessment:        1. Vascular dementia without behavioral disturbance    2. DM (diabetes mellitus), type 2 with neurological complications    3. Old cerebrovascular accident (CVA) without late effect    4. Paroxysmal A-fib             Plan:       Discussed with daughter.  He has cognitive impairment that is most likely on a vascular basis given his multiple risk factors.  He does not have any significant progression noted.  Namenda 10 mg  twice a day.  Discussed side effects including gait imbalance.  It is advised that he would in the mall for about 20-30 minutes every day this might help his overall functioning as he spends most of his time at usually watching TV or asleep.  Advised to continue with OTC B12 supplementation.  Follow-up in 6 months if stable.

## 2018-10-29 DIAGNOSIS — R05.9 COUGH: ICD-10-CM

## 2019-05-20 ENCOUNTER — OFFICE VISIT (OUTPATIENT)
Dept: NEUROLOGY | Facility: CLINIC | Age: 84
End: 2019-05-20
Payer: COMMERCIAL

## 2019-05-20 VITALS
BODY MASS INDEX: 24.84 KG/M2 | HEART RATE: 71 BPM | WEIGHT: 154.56 LBS | HEIGHT: 66 IN | DIASTOLIC BLOOD PRESSURE: 69 MMHG | SYSTOLIC BLOOD PRESSURE: 130 MMHG

## 2019-05-20 DIAGNOSIS — E11.49 DM (DIABETES MELLITUS), TYPE 2 WITH NEUROLOGICAL COMPLICATIONS: ICD-10-CM

## 2019-05-20 DIAGNOSIS — Z86.73 OLD CEREBROVASCULAR ACCIDENT (CVA) WITHOUT LATE EFFECT: ICD-10-CM

## 2019-05-20 DIAGNOSIS — F01.50 VASCULAR DEMENTIA WITHOUT BEHAVIORAL DISTURBANCE: Primary | ICD-10-CM

## 2019-05-20 DIAGNOSIS — I10 ESSENTIAL HYPERTENSION: ICD-10-CM

## 2019-05-20 DIAGNOSIS — I48.0 PAROXYSMAL A-FIB: ICD-10-CM

## 2019-05-20 PROCEDURE — 99999 PR PBB SHADOW E&M-EST. PATIENT-LVL III: ICD-10-PCS | Mod: PBBFAC,,, | Performed by: PSYCHIATRY & NEUROLOGY

## 2019-05-20 PROCEDURE — 1101F PR PT FALLS ASSESS DOC 0-1 FALLS W/OUT INJ PAST YR: ICD-10-PCS | Mod: CPTII,S$GLB,, | Performed by: PSYCHIATRY & NEUROLOGY

## 2019-05-20 PROCEDURE — 99214 PR OFFICE/OUTPT VISIT, EST, LEVL IV, 30-39 MIN: ICD-10-PCS | Mod: S$GLB,,, | Performed by: PSYCHIATRY & NEUROLOGY

## 2019-05-20 PROCEDURE — 99214 OFFICE O/P EST MOD 30 MIN: CPT | Mod: S$GLB,,, | Performed by: PSYCHIATRY & NEUROLOGY

## 2019-05-20 PROCEDURE — 99999 PR PBB SHADOW E&M-EST. PATIENT-LVL III: CPT | Mod: PBBFAC,,, | Performed by: PSYCHIATRY & NEUROLOGY

## 2019-05-20 PROCEDURE — 1101F PT FALLS ASSESS-DOCD LE1/YR: CPT | Mod: CPTII,S$GLB,, | Performed by: PSYCHIATRY & NEUROLOGY

## 2019-05-20 RX ORDER — ALPRAZOLAM 0.25 MG/1
0.25 TABLET ORAL NIGHTLY PRN
Qty: 30 TABLET | Refills: 3 | Status: SHIPPED | OUTPATIENT
Start: 2019-05-20 | End: 2019-11-16

## 2019-05-20 NOTE — PATIENT INSTRUCTIONS
Discussed with daughter.  He has cognitive impairment that is most likely on a vascular basis given his multiple risk factors.  He does not have any significant progression noted.  Namenda 10 mg  twice a day.  Discussed side effects including gait imbalance.  He is no longer on the Lexapro.  Will initiate alprazolam 0.25 mg half to 1 tablet at bedtime on an as-needed basis.  His daughter manages his medications.  Continue present level of activities including walking for exercise.  Advised to continue with OTC B12 supplementation.

## 2019-05-20 NOTE — PROGRESS NOTES
Subjective:       Patient ID: Sherri Bunch is a 89 y.o. male.    Chief Complaint:  No chief complaint on file.      History of Present Illness  HPI  This is a 89-year-old Chinese male who had been seen by me for evaluation of memory difficulties. His daughter was present today and was the primary historian and . He was apparently living in [place], [place] until December 2014. 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  He has had history memory difficulties since 02/14. He was apparently seen by physicians in [place] and because he was having gait difficulty was prescribed levodopa which did not help very much. He did not have any problems with tremor or shuffling and his gait was described as mild clumsiness especially when getting up and walking. He had no weakness.  He is no longer on the levodopa.      When last seen by me he was started on Namenda 10 mg which was increased to twice a day.  He has been stable with significant progression however he was having some problem sleep and was somewhat drowsy in the daytime.  This has improved since she started him on a Chinese herbal medicine to be taken in the daytime which has increased his initial levels and he is sleeping better.  She discontinued the Lexapro as he was having problems medications.  He still gets occasionally anxious specially in the evening and at bedtime.  He has a history of atrial fibrillation and is presently on anticoagulation.  He had a CT Scan of the brain that shows evidence of multiple small ischemic lesions in the right frontoparietal and bilateral basal ganglia.  This is most likely related to the history of atrial fibrillation.  An MRI scan of the brain done in 2017 showed essentially no change compared to the previous CT scan of the brain.  No acute abnormality was noted otherwise. He is able to take care of his personal hygiene and dress himself though he has had problem with incontinence related to  prostate problems but he cannot tolerate bladder medications after having been seen by urology.  He now wears depends.  His appetite and sleep is good.  His daughter does report that has been no significant progression in his memory difficulties.  His spouse was also present today.       Review of Systems  Review of Systems   Constitutional: Negative.    HENT: Positive for hearing loss (Mild hearing impairment).    Eyes: Negative.  Negative for visual disturbance.   Respiratory: Negative.  Negative for shortness of breath.    Cardiovascular: Negative.  Negative for chest pain and palpitations.   Gastrointestinal: Negative.    Endocrine: Negative.    Genitourinary: Negative.    Musculoskeletal: Negative.  Negative for back pain and gait problem.   Skin: Negative.    Allergic/Immunologic: Negative.    Neurological: Negative.  Negative for dizziness, tremors, seizures, syncope, speech difficulty, weakness, numbness and headaches.   Hematological: Negative.    Psychiatric/Behavioral: Positive for decreased concentration. Negative for sleep disturbance.       Objective:      Neurologic Exam      Physical Exam   Constitutional: He appears well-developed and well-nourished.   HENT:   Head: Normocephalic and atraumatic.   Right Ear: Hearing normal.   Left Ear: Hearing normal.   Eyes:   Fundus examination shows sharp disc margins.   Neck: Normal range of motion. Neck supple. Carotid bruit is not present.   Neurological: He is alert. He has normal reflexes. He displays no atrophy and no tremor. No cranial nerve deficit (Visual fields at bedside testing essentially normal.  No facial asymmetry noted with facial movements and sensory exam being normal/symmetrical.  Corneals/gag reflexes normal.  Tongue & palate movements normal.  Hearing unimpaired.  Shoulder shrug was norm) or sensory deficit (primary sensations intact). He exhibits normal muscle tone. He displays a negative Romberg sign. Coordination and gait (Gait is stable  though slightly wide-based.  Minimal clumsiness of fine motor movements bilaterally) normal. He displays no Babinski's sign on the right side. He displays no Babinski's sign on the left side.   Mental status examination: His daughter was the .  Patient is oriented to place and person and grossly to time.  He knows the month and year.  Recall of recent and past information is good.  Immediate recall is 1/3.  Mild impairment in attention span and concentration.  Judgment and insight is normal.  Processing of information is a little slow.  Language functions are intact with no evidence of aphasia or dysarthria.  Comprehension is unimpaired.  He is able to follow simple commands without difficulty.  Affect is appropriate, mood was even.  No thought disorder is noted.   Vitals reviewed.        Assessment:        1. Vascular dementia without behavioral disturbance    2. DM (diabetes mellitus), type 2 with neurological complications    3. Paroxysmal A-fib    4. Old cerebrovascular accident (CVA) without late effect    5. Essential hypertension             Plan:       Discussed with daughter.  He has cognitive impairment that is most likely on a vascular basis given his multiple risk factors.  He does not have any significant progression noted.  Namenda 10 mg  twice a day.  Discussed side effects including gait imbalance.  He is no longer on the Lexapro.  Will initiate alprazolam 0.25 mg half to 1 tablet at bedtime on an as-needed basis.  His daughter manages his medications.  Continue present level of activities including walking for exercise.  Advised to continue with OTC B12 supplementation.  Follow-up in 1 year if stable.

## 2019-05-27 ENCOUNTER — PATIENT MESSAGE (OUTPATIENT)
Dept: NEUROLOGY | Facility: CLINIC | Age: 84
End: 2019-05-27

## 2019-05-27 DIAGNOSIS — G31.84 MCI (MILD COGNITIVE IMPAIRMENT) WITH MEMORY LOSS: ICD-10-CM

## 2019-05-27 RX ORDER — MEMANTINE HYDROCHLORIDE 10 MG/1
10 TABLET ORAL 2 TIMES DAILY
Qty: 60 TABLET | Refills: 11 | Status: SHIPPED | OUTPATIENT
Start: 2019-05-27 | End: 2020-05-26

## 2019-05-28 ENCOUNTER — PATIENT MESSAGE (OUTPATIENT)
Dept: INTERNAL MEDICINE | Facility: CLINIC | Age: 84
End: 2019-05-28

## 2019-05-28 ENCOUNTER — PATIENT MESSAGE (OUTPATIENT)
Dept: NEUROLOGY | Facility: CLINIC | Age: 84
End: 2019-05-28

## 2019-06-24 ENCOUNTER — TELEPHONE (OUTPATIENT)
Dept: INTERNAL MEDICINE | Facility: CLINIC | Age: 84
End: 2019-06-24

## 2019-06-24 DIAGNOSIS — I10 ESSENTIAL HYPERTENSION: ICD-10-CM

## 2019-06-24 DIAGNOSIS — J18.9 COMMUNITY ACQUIRED PNEUMONIA, UNSPECIFIED LATERALITY: ICD-10-CM

## 2019-06-24 DIAGNOSIS — E11.42 ONYCHOMYCOSIS OF MULTIPLE TOENAILS WITH TYPE 2 DIABETES MELLITUS AND PERIPHERAL NEUROPATHY: ICD-10-CM

## 2019-06-24 DIAGNOSIS — E55.9 VITAMIN D DEFICIENCY: ICD-10-CM

## 2019-06-24 DIAGNOSIS — Z00.00 PREVENTATIVE HEALTH CARE: Primary | ICD-10-CM

## 2019-06-24 DIAGNOSIS — B35.1 ONYCHOMYCOSIS OF MULTIPLE TOENAILS WITH TYPE 2 DIABETES MELLITUS AND PERIPHERAL NEUROPATHY: ICD-10-CM

## 2019-06-24 DIAGNOSIS — M85.80 OSTEOPENIA, UNSPECIFIED LOCATION: ICD-10-CM

## 2019-06-24 DIAGNOSIS — N13.8 BPH WITH URINARY OBSTRUCTION: ICD-10-CM

## 2019-06-24 DIAGNOSIS — E11.49 DM (DIABETES MELLITUS), TYPE 2 WITH NEUROLOGICAL COMPLICATIONS: ICD-10-CM

## 2019-06-24 DIAGNOSIS — E11.69 ONYCHOMYCOSIS OF MULTIPLE TOENAILS WITH TYPE 2 DIABETES MELLITUS AND PERIPHERAL NEUROPATHY: ICD-10-CM

## 2019-06-24 DIAGNOSIS — F01.50 VASCULAR DEMENTIA WITHOUT BEHAVIORAL DISTURBANCE: ICD-10-CM

## 2019-06-24 DIAGNOSIS — Z86.73 OLD CEREBROVASCULAR ACCIDENT (CVA) WITHOUT LATE EFFECT: ICD-10-CM

## 2019-06-24 DIAGNOSIS — I48.0 PAROXYSMAL A-FIB: ICD-10-CM

## 2019-06-24 DIAGNOSIS — N40.1 BPH WITH URINARY OBSTRUCTION: ICD-10-CM

## 2019-06-24 DIAGNOSIS — N39.41 URGENCY INCONTINENCE: ICD-10-CM

## 2019-06-28 ENCOUNTER — HOSPITAL ENCOUNTER (OUTPATIENT)
Facility: HOSPITAL | Age: 84
Discharge: HOME | End: 2019-06-29
Attending: EMERGENCY MEDICINE | Admitting: HOSPITALIST
Payer: COMMERCIAL

## 2019-06-28 DIAGNOSIS — R11.2 NAUSEA AND VOMITING, INTRACTABILITY OF VOMITING NOT SPECIFIED, UNSPECIFIED VOMITING TYPE: Primary | ICD-10-CM

## 2019-06-28 DIAGNOSIS — R53.1 GENERALIZED WEAKNESS: ICD-10-CM

## 2019-06-28 LAB
ALBUMIN SERPL BCP-MCNC: 3.6 G/DL (ref 3.5–5.2)
ALP SERPL-CCNC: 88 U/L (ref 55–135)
ALT SERPL W/O P-5'-P-CCNC: 17 U/L (ref 10–44)
ANION GAP SERPL CALC-SCNC: 15 MMOL/L (ref 8–16)
AST SERPL-CCNC: 33 U/L (ref 10–40)
BACTERIA #/AREA URNS HPF: NORMAL /HPF
BASOPHILS # BLD AUTO: 0.02 K/UL (ref 0–0.2)
BASOPHILS NFR BLD: 0.2 % (ref 0–1.9)
BILIRUB SERPL-MCNC: 1.1 MG/DL (ref 0.1–1)
BILIRUB UR QL STRIP: NEGATIVE
BUN SERPL-MCNC: 22 MG/DL (ref 10–30)
CALCIUM SERPL-MCNC: 9.5 MG/DL (ref 8.7–10.5)
CHLORIDE SERPL-SCNC: 100 MMOL/L (ref 95–110)
CLARITY UR: CLEAR
CO2 SERPL-SCNC: 21 MMOL/L (ref 23–29)
COLOR UR: YELLOW
CREAT SERPL-MCNC: 1 MG/DL (ref 0.5–1.4)
DIFFERENTIAL METHOD: NORMAL
EOSINOPHIL # BLD AUTO: 0 K/UL (ref 0–0.5)
EOSINOPHIL NFR BLD: 0.2 % (ref 0–8)
ERYTHROCYTE [DISTWIDTH] IN BLOOD BY AUTOMATED COUNT: 13.3 % (ref 11.5–14.5)
EST. GFR  (AFRICAN AMERICAN): >60 ML/MIN/1.73 M^2
EST. GFR  (NON AFRICAN AMERICAN): >60 ML/MIN/1.73 M^2
ESTIMATED AVG GLUCOSE: 171 MG/DL (ref 68–131)
GLUCOSE SERPL-MCNC: 205 MG/DL (ref 70–110)
GLUCOSE UR QL STRIP: ABNORMAL
HBA1C MFR BLD HPLC: 7.6 % (ref 4–5.6)
HCT VFR BLD AUTO: 49.1 % (ref 40–54)
HGB BLD-MCNC: 16.6 G/DL (ref 14–18)
HGB UR QL STRIP: NEGATIVE
INFLUENZA A, MOLECULAR: NEGATIVE
INFLUENZA B, MOLECULAR: NEGATIVE
KETONES UR QL STRIP: ABNORMAL
LEUKOCYTE ESTERASE UR QL STRIP: NEGATIVE
LIPASE SERPL-CCNC: 8 U/L (ref 4–60)
LYMPHOCYTES # BLD AUTO: 2.6 K/UL (ref 1–4.8)
LYMPHOCYTES NFR BLD: 25.6 % (ref 18–48)
MCH RBC QN AUTO: 29.9 PG (ref 27–31)
MCHC RBC AUTO-ENTMCNC: 33.8 G/DL (ref 32–36)
MCV RBC AUTO: 89 FL (ref 82–98)
MICROSCOPIC COMMENT: NORMAL
MONOCYTES # BLD AUTO: 0.7 K/UL (ref 0.3–1)
MONOCYTES NFR BLD: 6.7 % (ref 4–15)
NEUTROPHILS # BLD AUTO: 6.8 K/UL (ref 1.8–7.7)
NEUTROPHILS NFR BLD: 67.3 % (ref 38–73)
NITRITE UR QL STRIP: NEGATIVE
PH UR STRIP: 7 [PH] (ref 5–8)
PLATELET # BLD AUTO: 265 K/UL (ref 150–350)
PMV BLD AUTO: 10.3 FL (ref 9.2–12.9)
POCT GLUCOSE: 159 MG/DL (ref 70–110)
POCT GLUCOSE: 228 MG/DL (ref 70–110)
POTASSIUM SERPL-SCNC: 4.1 MMOL/L (ref 3.5–5.1)
PROT SERPL-MCNC: 7.7 G/DL (ref 6–8.4)
PROT UR QL STRIP: ABNORMAL
RBC # BLD AUTO: 5.55 M/UL (ref 4.6–6.2)
RBC #/AREA URNS HPF: 0 /HPF (ref 0–4)
SODIUM SERPL-SCNC: 136 MMOL/L (ref 136–145)
SP GR UR STRIP: 1.01 (ref 1–1.03)
SPECIMEN SOURCE: NORMAL
SQUAMOUS #/AREA URNS HPF: 0 /HPF
URN SPEC COLLECT METH UR: ABNORMAL
UROBILINOGEN UR STRIP-ACNC: NEGATIVE EU/DL
WBC # BLD AUTO: 10.14 K/UL (ref 3.9–12.7)
WBC #/AREA URNS HPF: 1 /HPF (ref 0–5)
YEAST URNS QL MICRO: NORMAL

## 2019-06-28 PROCEDURE — 81000 URINALYSIS NONAUTO W/SCOPE: CPT

## 2019-06-28 PROCEDURE — 85025 COMPLETE CBC W/AUTO DIFF WBC: CPT

## 2019-06-28 PROCEDURE — 96361 HYDRATE IV INFUSION ADD-ON: CPT

## 2019-06-28 PROCEDURE — 11000001 HC ACUTE MED/SURG PRIVATE ROOM

## 2019-06-28 PROCEDURE — 96374 THER/PROPH/DIAG INJ IV PUSH: CPT

## 2019-06-28 PROCEDURE — 93010 ELECTROCARDIOGRAM REPORT: CPT | Mod: ,,, | Performed by: INTERNAL MEDICINE

## 2019-06-28 PROCEDURE — 83690 ASSAY OF LIPASE: CPT

## 2019-06-28 PROCEDURE — 83036 HEMOGLOBIN GLYCOSYLATED A1C: CPT

## 2019-06-28 PROCEDURE — 36415 COLL VENOUS BLD VENIPUNCTURE: CPT

## 2019-06-28 PROCEDURE — 93010 EKG 12-LEAD: ICD-10-PCS | Mod: ,,, | Performed by: INTERNAL MEDICINE

## 2019-06-28 PROCEDURE — G0378 HOSPITAL OBSERVATION PER HR: HCPCS

## 2019-06-28 PROCEDURE — 25000003 PHARM REV CODE 250: Performed by: EMERGENCY MEDICINE

## 2019-06-28 PROCEDURE — 25000003 PHARM REV CODE 250: Performed by: NURSE PRACTITIONER

## 2019-06-28 PROCEDURE — 63600175 PHARM REV CODE 636 W HCPCS: Performed by: NURSE PRACTITIONER

## 2019-06-28 PROCEDURE — 93005 ELECTROCARDIOGRAM TRACING: CPT

## 2019-06-28 PROCEDURE — 63600175 PHARM REV CODE 636 W HCPCS

## 2019-06-28 PROCEDURE — 87502 INFLUENZA DNA AMP PROBE: CPT

## 2019-06-28 PROCEDURE — 80053 COMPREHEN METABOLIC PANEL: CPT

## 2019-06-28 PROCEDURE — 99285 EMERGENCY DEPT VISIT HI MDM: CPT | Mod: 25

## 2019-06-28 RX ORDER — ONDANSETRON 2 MG/ML
INJECTION INTRAMUSCULAR; INTRAVENOUS
Status: COMPLETED
Start: 2019-06-28 | End: 2019-06-28

## 2019-06-28 RX ORDER — DABIGATRAN ETEXILATE 75 MG/1
75 CAPSULE ORAL DAILY
Status: DISCONTINUED | OUTPATIENT
Start: 2019-06-28 | End: 2019-06-29 | Stop reason: HOSPADM

## 2019-06-28 RX ORDER — MEMANTINE HYDROCHLORIDE 5 MG/1
10 TABLET ORAL 2 TIMES DAILY
Status: DISCONTINUED | OUTPATIENT
Start: 2019-06-28 | End: 2019-06-29 | Stop reason: HOSPADM

## 2019-06-28 RX ORDER — HYDRALAZINE HYDROCHLORIDE 20 MG/ML
5 INJECTION INTRAMUSCULAR; INTRAVENOUS EVERY 8 HOURS PRN
Status: DISCONTINUED | OUTPATIENT
Start: 2019-06-28 | End: 2019-06-29 | Stop reason: HOSPADM

## 2019-06-28 RX ORDER — AMOXICILLIN 250 MG
1 CAPSULE ORAL 2 TIMES DAILY PRN
Status: DISCONTINUED | OUTPATIENT
Start: 2019-06-28 | End: 2019-06-29 | Stop reason: HOSPADM

## 2019-06-28 RX ORDER — IBUPROFEN 200 MG
24 TABLET ORAL
Status: DISCONTINUED | OUTPATIENT
Start: 2019-06-28 | End: 2019-06-29 | Stop reason: HOSPADM

## 2019-06-28 RX ORDER — HYDROCHLOROTHIAZIDE 12.5 MG/1
12.5 TABLET ORAL DAILY
Status: DISCONTINUED | OUTPATIENT
Start: 2019-06-28 | End: 2019-06-28

## 2019-06-28 RX ORDER — GLUCAGON 1 MG
1 KIT INJECTION
Status: DISCONTINUED | OUTPATIENT
Start: 2019-06-28 | End: 2019-06-29 | Stop reason: HOSPADM

## 2019-06-28 RX ORDER — FAMOTIDINE 20 MG/1
20 TABLET, FILM COATED ORAL DAILY
Status: DISCONTINUED | OUTPATIENT
Start: 2019-06-28 | End: 2019-06-29 | Stop reason: HOSPADM

## 2019-06-28 RX ORDER — SODIUM CHLORIDE 9 MG/ML
INJECTION, SOLUTION INTRAVENOUS CONTINUOUS
Status: DISCONTINUED | OUTPATIENT
Start: 2019-06-28 | End: 2019-06-29 | Stop reason: HOSPADM

## 2019-06-28 RX ORDER — ONDANSETRON 2 MG/ML
4 INJECTION INTRAMUSCULAR; INTRAVENOUS EVERY 6 HOURS PRN
Status: DISCONTINUED | OUTPATIENT
Start: 2019-06-28 | End: 2019-06-29 | Stop reason: HOSPADM

## 2019-06-28 RX ORDER — METOPROLOL SUCCINATE 25 MG/1
25 TABLET, EXTENDED RELEASE ORAL DAILY
Status: DISCONTINUED | OUTPATIENT
Start: 2019-06-28 | End: 2019-06-28

## 2019-06-28 RX ORDER — METOPROLOL SUCCINATE 25 MG/1
25 TABLET, EXTENDED RELEASE ORAL DAILY
Status: DISCONTINUED | OUTPATIENT
Start: 2019-06-28 | End: 2019-06-29 | Stop reason: HOSPADM

## 2019-06-28 RX ORDER — HYDROCHLOROTHIAZIDE 12.5 MG/1
12.5 TABLET ORAL DAILY
Status: DISCONTINUED | OUTPATIENT
Start: 2019-06-28 | End: 2019-06-29 | Stop reason: HOSPADM

## 2019-06-28 RX ORDER — ACETAMINOPHEN 325 MG/1
650 TABLET ORAL EVERY 4 HOURS PRN
Status: DISCONTINUED | OUTPATIENT
Start: 2019-06-28 | End: 2019-06-29 | Stop reason: HOSPADM

## 2019-06-28 RX ORDER — SODIUM CHLORIDE 0.9 % (FLUSH) 0.9 %
10 SYRINGE (ML) INJECTION
Status: DISCONTINUED | OUTPATIENT
Start: 2019-06-28 | End: 2019-06-29 | Stop reason: HOSPADM

## 2019-06-28 RX ORDER — ONDANSETRON 2 MG/ML
4 INJECTION INTRAMUSCULAR; INTRAVENOUS
Status: COMPLETED | OUTPATIENT
Start: 2019-06-28 | End: 2019-06-28

## 2019-06-28 RX ORDER — INSULIN ASPART 100 [IU]/ML
0-5 INJECTION, SOLUTION INTRAVENOUS; SUBCUTANEOUS
Status: DISCONTINUED | OUTPATIENT
Start: 2019-06-28 | End: 2019-06-29 | Stop reason: HOSPADM

## 2019-06-28 RX ORDER — ALPRAZOLAM 0.25 MG/1
0.25 TABLET ORAL NIGHTLY PRN
Status: DISCONTINUED | OUTPATIENT
Start: 2019-06-28 | End: 2019-06-29 | Stop reason: HOSPADM

## 2019-06-28 RX ORDER — IBUPROFEN 200 MG
16 TABLET ORAL
Status: DISCONTINUED | OUTPATIENT
Start: 2019-06-28 | End: 2019-06-29 | Stop reason: HOSPADM

## 2019-06-28 RX ORDER — CETIRIZINE HYDROCHLORIDE 5 MG/1
5 TABLET ORAL DAILY
Status: DISCONTINUED | OUTPATIENT
Start: 2019-06-28 | End: 2019-06-29 | Stop reason: HOSPADM

## 2019-06-28 RX ADMIN — MEMANTINE HYDROCHLORIDE 10 MG: 5 TABLET ORAL at 09:06

## 2019-06-28 RX ADMIN — FAMOTIDINE 20 MG: 20 TABLET ORAL at 02:06

## 2019-06-28 RX ADMIN — ONDANSETRON 4 MG: 2 INJECTION INTRAMUSCULAR; INTRAVENOUS at 09:06

## 2019-06-28 RX ADMIN — HYDRALAZINE HYDROCHLORIDE 5 MG: 20 INJECTION INTRAMUSCULAR; INTRAVENOUS at 03:06

## 2019-06-28 RX ADMIN — METOPROLOL SUCCINATE 25 MG: 25 TABLET, FILM COATED, EXTENDED RELEASE ORAL at 02:06

## 2019-06-28 RX ADMIN — CETIRIZINE HYDROCHLORIDE 5 MG: 5 TABLET ORAL at 02:06

## 2019-06-28 RX ADMIN — HYDROCHLOROTHIAZIDE 12.5 MG: 12.5 TABLET ORAL at 02:06

## 2019-06-28 RX ADMIN — ALPRAZOLAM 0.25 MG: 0.25 TABLET ORAL at 11:06

## 2019-06-28 RX ADMIN — SODIUM CHLORIDE 1000 ML: 0.9 INJECTION, SOLUTION INTRAVENOUS at 09:06

## 2019-06-28 RX ADMIN — SODIUM CHLORIDE: 0.9 INJECTION, SOLUTION INTRAVENOUS at 02:06

## 2019-06-28 RX ADMIN — DABIGATRAN ETEXILATE MESYLATE 75 MG: 75 CAPSULE ORAL at 02:06

## 2019-06-28 NOTE — ED PROVIDER NOTES
Encounter Date: 6/28/2019    SCRIBE #1 NOTE: I, Xuan Hutchins, am scribing for, and in the presence of,  Dr. Dao.. I have scribed the entire note.       History     Chief Complaint   Patient presents with    Emesis     pt began c/o abdominal discomfort yesterday and began vomiting last night. Family does not think pt had any urine output last night. Family concerned for dehydration.     Sherri Bunch is a 89 y.o. male who  has a past medical history of Atrial fibrillation, Diabetes, Diabetes mellitus, type 2, Hyperlipidemia, and Hypertension.    The patient presents to the ED due to fatigue and emesis. His son-in-law reports onset of symptoms were yesterday. Mr. Bunch started feeling ill yesterday afternoon. He missed two meals and also was not drinking water. He had no urine output last night, and his last bowel movement was last night, but was very small. The patient's daughter was worried about dehydration yesterday and fed the patient watermelon and yogurt which he vomited up. The patient denies fever, chills, diarrhea, sick contact, or recent travel. Of note, the family member states that the patient had similar symptoms two years ago and was treated for dehydration in the ED and symptoms improved. He denies any aggravating or alleviating factors. Mr. Bunch was able to walk to the car and into the ED with the help of family today. The patients does not have a history of surgeries or known drug allergies. He is being seen by a Neurologist for Alzheimer's and is taking Namenda. He is also currently taking Pradaxa for history of A.fib, but missed his dose this morning due to his symptoms.    The history is provided by a relative. The history is limited by a language barrier (Martti used for Mandarin).     Review of patient's allergies indicates:  No Known Allergies  Past Medical History:   Diagnosis Date    Atrial fibrillation     Diabetes     Diabetes mellitus, type 2     Hyperlipidemia     Hypertension       History reviewed. No pertinent surgical history.  Family History   Problem Relation Age of Onset    Heart disease Sister     No Known Problems Daughter     Heart disease Brother     No Known Problems Daughter      Social History     Tobacco Use    Smoking status: Former Smoker     Packs/day: 0.15     Start date: 1960     Last attempt to quit: 1970     Years since quittin.9    Smokeless tobacco: Never Used    Tobacco comment: Retired  in [place]   Substance Use Topics    Alcohol use: No     Alcohol/week: 0.0 oz    Drug use: No     Review of Systems   Constitutional: Positive for appetite change and fatigue.   Respiratory: Negative for chest tightness and shortness of breath.    Cardiovascular: Negative for chest pain.   Gastrointestinal: Positive for nausea and vomiting. Negative for abdominal pain and diarrhea.   Genitourinary: Positive for decreased urine volume. Negative for dysuria.   Musculoskeletal: Negative for arthralgias.   Skin: Negative for color change and pallor.   Neurological: Negative for dizziness and headaches.   All other systems reviewed and are negative.      Physical Exam     Initial Vitals [19 0906]   BP Pulse Resp Temp SpO2   (!) 197/84 68 16 98.5 °F (36.9 °C) (!) 94 %      MAP       --         Physical Exam    Nursing note and vitals reviewed.  Constitutional: He appears well-developed and well-nourished. He is not diaphoretic. No distress.   Somnolent, but easily arousable.    HENT:   Head: Normocephalic and atraumatic.   Mouth/Throat: Oropharynx is clear and moist. Mucous membranes are dry (partially).   Eyes: Conjunctivae and EOM are normal.   Neck: Normal range of motion. Neck supple.   Cardiovascular: Normal rate, regular rhythm and normal heart sounds. Exam reveals no gallop and no friction rub.    No murmur heard.  Pulmonary/Chest: Breath sounds normal. He has no wheezes. He has no rhonchi. He has no rales.   Abdominal:  Soft. There is no tenderness. There is no rebound and no guarding.   Genitourinary:   Genitourinary Comments: Wet diaper, no urinary retention.   Musculoskeletal: Normal range of motion. He exhibits no edema or tenderness.   Lymphadenopathy:     He has no cervical adenopathy.   Neurological: He is oriented to person, place, and time. He has normal strength.   Skin: Skin is warm and dry. No rash noted.         ED Course   Procedures  Labs Reviewed   COMPREHENSIVE METABOLIC PANEL - Abnormal; Notable for the following components:       Result Value    CO2 21 (*)     Glucose 205 (*)     Total Bilirubin 1.1 (*)     All other components within normal limits   URINALYSIS, REFLEX TO URINE CULTURE - Abnormal; Notable for the following components:    Protein, UA Trace (*)     Glucose, UA 3+ (*)     Ketones, UA 1+ (*)     All other components within normal limits    Narrative:     Preferred Collection Type->Urine, Clean Catch   CBC W/ AUTO DIFFERENTIAL   LIPASE   URINALYSIS MICROSCOPIC    Narrative:     Preferred Collection Type->Urine, Clean Catch     EKG Readings: (Independently Interpreted)   Normal sinus rhythm with rate of 76.  No S/T elevation.  No STEMI.       Imaging Results    None          Medical Decision Making:   Initial Assessment:   The patient presents to the ED due to fatigue and emesis.  Differential Diagnosis:   My differential diagnosis includes: gastroenteritis, Acute Coronary Syndrome, gastritis, viral syndrome, cholecystitis, cholelithiasis, pancreatits.  ED Management:  12:58 PM  Patient improved though still unable to stand on his own. Serial abdominal exams benign. No vomiting or diarrhea while in ED. Will admit for further fluid resuscitation.                    ED Course as of Jun 29 2032 Fri Jun 28, 2019   1232 Patient received 1 liter of fluids.  He is unable to stand up and remains weak. Pt is moving all extremities x4. Will admit for further hydration.    [ANNABEL]   1257 Case discussed with   [place], [facility], and will admit.    [ANNABEL]      ED Course User Index  [ANNABEL] Xuan Hutchins     Clinical Impression:     1. Nausea and vomiting, intractability of vomiting not specified, unspecified vomiting type    2. Generalized weakness        I, Dr. Foster Dao, personally performed the services described in this documentation. All medical record entries made by the scribe were at my direction and in my presence.  I have reviewed the chart and agree that the record reflects my personal performance and is accurate and complete.                         Foster Dao MD  06/28/19 1301       Foster Dao MD  06/29/19 2034

## 2019-06-28 NOTE — HPI
89 y.o. male with past medical history of alzheimers, atrial fibrillation, diabetes type 2, hyperlipidemia and hypertension presents to ED with complaints of decreased appetite, nausea and vomiting that began yesterday afternoon and has been persistant.  Information obtained per patients son-n law.  Per son-n-law he missed two meals and also was not drinking water. He had no urine output last night.  Family is concerned about dehydration, patient had a similar episode like this two years ago in which he was hospitalized and diagnosed with dehydration. Patient denies fever, chills, abdominal pain, diarrhea, sick contacts, recent travel, chest pain, shortness of breath or cold symptoms.  He denies any aggravating or alleviating factors.  ED findings: Glucose 205, HgbA1C 7.6, Bilirubin 1.1.  Patient admitted to hospital medicine for observation.

## 2019-06-28 NOTE — ASSESSMENT & PLAN NOTE
Hemoglobin A1c 7.6  Current    POC glucose checks ac meals and nightly  Low dose SSI PRN  Hypoglycemia protocol PRN

## 2019-06-28 NOTE — PLAN OF CARE
Patient is a transfer from ED.  Patient is alert and only knows self.  Son-in-law at bedside.  Diaper in use as patient is incontinent of bowel and bladder.  Swallows pills without any difficulty.  IVF infusing via left wrist IV access.  Denies pain.  Mandarin speaking.  Safety is maintained with bed low, wheels locked and side rails up.  Call light within reach.  Will continue to monitor.

## 2019-06-28 NOTE — SUBJECTIVE & OBJECTIVE
Past Medical History:   Diagnosis Date    Atrial fibrillation     Diabetes     Diabetes mellitus, type 2     Hyperlipidemia     Hypertension        History reviewed. No pertinent surgical history.    Review of patient's allergies indicates:  No Known Allergies    No current facility-administered medications on file prior to encounter.      Current Outpatient Medications on File Prior to Encounter   Medication Sig    ALPRAZolam (XANAX) 0.25 MG tablet Take 1 tablet (0.25 mg total) by mouth nightly as needed for Anxiety.    CALCIUM CARBONATE/VITAMIN D3 (CALCIUM 600 WITH VITAMIN D3 ORAL) Take 1 tablet by mouth once daily.    dronedarone (MULTAQ) 400 mg Tab Take 400 mg by mouth 2 (two) times daily with meals.    fish oil-omega-3 fatty acids 300-1,000 mg capsule Take 1 g by mouth once daily.    hydrochlorothiazide (MICROZIDE) 12.5 mg capsule 12.5 mg once daily.     Lactobacillus acidophilus (PROBIOTIC) 10 billion cell Cap Take by mouth once daily.     levocetirizine (XYZAL) 5 MG tablet Take 1 tablet (5 mg total) by mouth every evening.    LYCOPENE ORAL Take 5 mg by mouth once daily.    memantine (NAMENDA) 10 MG Tab Take 1 tablet (10 mg total) by mouth 2 (two) times daily.    metoprolol succinate (TOPROL-XL) 25 MG 24 hr tablet     multivitamin (ONE DAILY MULTIVITAMIN) per tablet Take 1 tablet by mouth once daily.    PRADAXA 75 mg Cap     ranitidine (ZANTAC) 300 MG tablet TAKE 1 TABLET (300 MG TOTAL) BY MOUTH EVERY EVENING.     Family History     Problem Relation (Age of Onset)    Heart disease Sister, Brother    No Known Problems Daughter, Daughter        Tobacco Use    Smoking status: Former Smoker     Packs/day: 0.15     Start date: 1960     Last attempt to quit: 1970     Years since quittin.9    Smokeless tobacco: Never Used    Tobacco comment: Retired  in [place]   Substance and Sexual Activity    Alcohol use: No     Alcohol/week: 0.0 oz    Drug  use: No    Sexual activity: Not Currently     Partners: Female     Review of Systems   Unable to perform ROS: Dementia     Objective:     Vital Signs (Most Recent):  Temp: 97.8 °F (36.6 °C) (06/28/19 1436)  Pulse: 73 (06/28/19 1605)  Resp: 18 (06/28/19 1605)  BP: (!) 160/70 (06/28/19 1605)  SpO2: 96 % (06/28/19 1401) Vital Signs (24h Range):  Temp:  [97.8 °F (36.6 °C)-98.5 °F (36.9 °C)] 97.8 °F (36.6 °C)  Pulse:  [64-77] 73  Resp:  [13-22] 18  SpO2:  [94 %-96 %] 96 %  BP: (138-197)/(61-84) 160/70     Weight: 69 kg (152 lb 1.9 oz)  Body mass index is 24.55 kg/m².    Physical Exam   Constitutional: He appears well-developed and well-nourished.   HENT:   Head: Normocephalic and atraumatic.   Eyes: EOM are normal.   Neck: Normal range of motion. Neck supple.   Cardiovascular: Normal rate.   Pulmonary/Chest: Effort normal and breath sounds normal.   Abdominal: Soft. Bowel sounds are normal.   Musculoskeletal: Normal range of motion.   Neurological: He is alert.   Skin: Skin is warm and dry.   Psychiatric: He has a normal mood and affect.         CRANIAL NERVES     CN III, IV, VI   Extraocular motions are normal.        Significant Labs:   A1C:   Recent Labs   Lab 06/28/19  0951   HGBA1C 7.6*     BMP:   Recent Labs   Lab 06/28/19  0951   *      K 4.1      CO2 21*   BUN 22   CREATININE 1.0   CALCIUM 9.5     CBC:   Recent Labs   Lab 06/28/19  0951   WBC 10.14   HGB 16.6   HCT 49.1        CMP:   Recent Labs   Lab 06/28/19  0951      K 4.1      CO2 21*   *   BUN 22   CREATININE 1.0   CALCIUM 9.5   PROT 7.7   ALBUMIN 3.6   BILITOT 1.1*   ALKPHOS 88   AST 33   ALT 17   ANIONGAP 15   EGFRNONAA >60       Significant Imaging: I have reviewed all pertinent imaging results/findings within the past 24 hours.

## 2019-06-28 NOTE — ASSESSMENT & PLAN NOTE
Patient has been having nausea and vomiting and decreased appetite since yesterday afternoon, concerned about dehydration.    Clear liquid diet, advance as tolerated  IVFs  Prn antiemetics   Cbc, bmp, mg, phos in am

## 2019-06-28 NOTE — ASSESSMENT & PLAN NOTE
Rate controlled 64-77  Continue Pradaxa, Metoprolol and Dronedarone  Continuous cardiac monitoring

## 2019-06-28 NOTE — H&P
[facility] Hospital Medicine  History & Physical    Patient Name: Sherri Bunch  MRN: [id number]  Admission Date: 6/28/2019  Attending Physician: Savage Diaz DO   Primary Care Provider: Ozzy Jerome MD         Patient information was obtained from patient and ER records.     Subjective:     Principal Problem:Nausea and vomiting    Chief Complaint:   Chief Complaint   Patient presents with    Emesis     pt began c/o abdominal discomfort yesterday and began vomiting last night. Family does not think pt had any urine output last night. Family concerned for dehydration.        HPI: 89 y.o. male with past medical history of alzheimers, atrial fibrillation, diabetes type 2, hyperlipidemia and hypertension presents to ED with complaints of decreased appetite, nausea and vomiting that began yesterday afternoon and has been persistant.  Information obtained per patients son-n law.  Per son-n-law he missed two meals and also was not drinking water. He had no urine output last night.  Family is concerned about dehydration, patient had a similar episode like this two years ago in which he was hospitalized and diagnosed with dehydration. Patient denies fever, chills, abdominal pain, diarrhea, sick contacts, recent travel, chest pain, shortness of breath or cold symptoms.  He denies any aggravating or alleviating factors.  ED findings: Glucose 205, HgbA1C 7.6, Bilirubin 1.1.  Patient admitted to hospital medicine for observation.    Past Medical History:   Diagnosis Date    Atrial fibrillation     Diabetes     Diabetes mellitus, type 2     Hyperlipidemia     Hypertension        History reviewed. No pertinent surgical history.    Review of patient's allergies indicates:  No Known Allergies    No current facility-administered medications on file prior to encounter.      Current Outpatient Medications on File Prior to Encounter   Medication Sig    ALPRAZolam (XANAX) 0.25 MG tablet Take 1 tablet (0.25 mg  total) by mouth nightly as needed for Anxiety.    CALCIUM CARBONATE/VITAMIN D3 (CALCIUM 600 WITH VITAMIN D3 ORAL) Take 1 tablet by mouth once daily.    dronedarone (MULTAQ) 400 mg Tab Take 400 mg by mouth 2 (two) times daily with meals.    fish oil-omega-3 fatty acids 300-1,000 mg capsule Take 1 g by mouth once daily.    hydrochlorothiazide (MICROZIDE) 12.5 mg capsule 12.5 mg once daily.     Lactobacillus acidophilus (PROBIOTIC) 10 billion cell Cap Take by mouth once daily.     levocetirizine (XYZAL) 5 MG tablet Take 1 tablet (5 mg total) by mouth every evening.    LYCOPENE ORAL Take 5 mg by mouth once daily.    memantine (NAMENDA) 10 MG Tab Take 1 tablet (10 mg total) by mouth 2 (two) times daily.    metoprolol succinate (TOPROL-XL) 25 MG 24 hr tablet     multivitamin (ONE DAILY MULTIVITAMIN) per tablet Take 1 tablet by mouth once daily.    PRADAXA 75 mg Cap     ranitidine (ZANTAC) 300 MG tablet TAKE 1 TABLET (300 MG TOTAL) BY MOUTH EVERY EVENING.     Family History     Problem Relation (Age of Onset)    Heart disease Sister, Brother    No Known Problems Daughter, Daughter        Tobacco Use    Smoking status: Former Smoker     Packs/day: 0.15     Start date: 1960     Last attempt to quit: 1970     Years since quittin.9    Smokeless tobacco: Never Used    Tobacco comment: Retired  in [place]   Substance and Sexual Activity    Alcohol use: No     Alcohol/week: 0.0 oz    Drug use: No    Sexual activity: Not Currently     Partners: Female     Review of Systems   Unable to perform ROS: Dementia     Objective:     Vital Signs (Most Recent):  Temp: 97.8 °F (36.6 °C) (19 1436)  Pulse: 73 (19 1605)  Resp: 18 (19 1605)  BP: (!) 160/70 (19 1605)  SpO2: 96 % (19 1401) Vital Signs (24h Range):  Temp:  [97.8 °F (36.6 °C)-98.5 °F (36.9 °C)] 97.8 °F (36.6 °C)  Pulse:  [64-77] 73  Resp:  [13-22] 18  SpO2:  [94 %-96 %] 96 %  BP:  (138-197)/(61-84) 160/70     Weight: 69 kg (152 lb 1.9 oz)  Body mass index is 24.55 kg/m².    Physical Exam   Constitutional: He appears well-developed and well-nourished.   HENT:   Head: Normocephalic and atraumatic.   Eyes: EOM are normal.   Neck: Normal range of motion. Neck supple.   Cardiovascular: Normal rate.   Pulmonary/Chest: Effort normal and breath sounds normal.   Abdominal: Soft. Bowel sounds are normal.   Musculoskeletal: Normal range of motion.   Neurological: He is alert.   Skin: Skin is warm and dry.   Psychiatric: He has a normal mood and affect.         CRANIAL NERVES     CN III, IV, VI   Extraocular motions are normal.        Significant Labs:   A1C:   Recent Labs   Lab 06/28/19  0951   HGBA1C 7.6*     BMP:   Recent Labs   Lab 06/28/19  0951   *      K 4.1      CO2 21*   BUN 22   CREATININE 1.0   CALCIUM 9.5     CBC:   Recent Labs   Lab 06/28/19  0951   WBC 10.14   HGB 16.6   HCT 49.1        CMP:   Recent Labs   Lab 06/28/19  0951      K 4.1      CO2 21*   *   BUN 22   CREATININE 1.0   CALCIUM 9.5   PROT 7.7   ALBUMIN 3.6   BILITOT 1.1*   ALKPHOS 88   AST 33   ALT 17   ANIONGAP 15   EGFRNONAA >60       Significant Imaging: I have reviewed all pertinent imaging results/findings within the past 24 hours.    Assessment/Plan:     * Nausea and vomiting  Patient has been having nausea and vomiting and decreased appetite since yesterday afternoon, concerned about dehydration.    Clear liquid diet, advance as tolerated  IVFs  Prn antiemetics   Cbc, bmp, mg, phos in am      Vascular dementia without behavioral disturbance  Continue Namenda      Essential hypertension  Continue hydrochlorothiazide and metoprolol  Hydralazine prn  Continuous cardiac monitoring      DM (diabetes mellitus), type 2 with neurological complications  Hemoglobin A1c 7.6  Current    POC glucose checks ac meals and nightly  Low dose SSI PRN  Hypoglycemia protocol  PRN                Paroxysmal A-fib  Rate controlled 64-77  Continue Pradaxa, Metoprolol and Dronedarone  Continuous cardiac monitoring        VTE Risk Mitigation (From admission, onward)        Ordered     dabigatran etexilate capsule 75 mg  Daily      06/28/19 1410     IP VTE HIGH RISK PATIENT  Once      06/28/19 1410             Darling Taylor NP  Department of Hospital Medicine   [facility]

## 2019-06-28 NOTE — ED NOTES
Orthostatics performed:     Lying:   /67 HR 68  Sitting:  /84 HR 75  Unable to stand due to weakness

## 2019-06-29 VITALS
OXYGEN SATURATION: 95 % | RESPIRATION RATE: 19 BRPM | HEIGHT: 66 IN | SYSTOLIC BLOOD PRESSURE: 131 MMHG | TEMPERATURE: 98 F | BODY MASS INDEX: 25.26 KG/M2 | WEIGHT: 157.19 LBS | DIASTOLIC BLOOD PRESSURE: 57 MMHG | HEART RATE: 74 BPM

## 2019-06-29 PROBLEM — R11.2 NAUSEA AND VOMITING: Status: RESOLVED | Noted: 2019-06-28 | Resolved: 2019-06-29

## 2019-06-29 LAB
ANION GAP SERPL CALC-SCNC: 8 MMOL/L (ref 8–16)
BASOPHILS # BLD AUTO: 0.01 K/UL (ref 0–0.2)
BASOPHILS NFR BLD: 0.1 % (ref 0–1.9)
BUN SERPL-MCNC: 16 MG/DL (ref 10–30)
CALCIUM SERPL-MCNC: 8.7 MG/DL (ref 8.7–10.5)
CHLORIDE SERPL-SCNC: 101 MMOL/L (ref 95–110)
CO2 SERPL-SCNC: 25 MMOL/L (ref 23–29)
CREAT SERPL-MCNC: 0.9 MG/DL (ref 0.5–1.4)
DIFFERENTIAL METHOD: ABNORMAL
EOSINOPHIL # BLD AUTO: 0 K/UL (ref 0–0.5)
EOSINOPHIL NFR BLD: 0.1 % (ref 0–8)
ERYTHROCYTE [DISTWIDTH] IN BLOOD BY AUTOMATED COUNT: 13.5 % (ref 11.5–14.5)
EST. GFR  (AFRICAN AMERICAN): >60 ML/MIN/1.73 M^2
EST. GFR  (NON AFRICAN AMERICAN): >60 ML/MIN/1.73 M^2
GLUCOSE SERPL-MCNC: 145 MG/DL (ref 70–110)
HCT VFR BLD AUTO: 47 % (ref 40–54)
HGB BLD-MCNC: 16 G/DL (ref 14–18)
LYMPHOCYTES # BLD AUTO: 1.1 K/UL (ref 1–4.8)
LYMPHOCYTES NFR BLD: 10.9 % (ref 18–48)
MAGNESIUM SERPL-MCNC: 1.8 MG/DL (ref 1.6–2.6)
MCH RBC QN AUTO: 29.9 PG (ref 27–31)
MCHC RBC AUTO-ENTMCNC: 34 G/DL (ref 32–36)
MCV RBC AUTO: 88 FL (ref 82–98)
MONOCYTES # BLD AUTO: 0.9 K/UL (ref 0.3–1)
MONOCYTES NFR BLD: 8.8 % (ref 4–15)
NEUTROPHILS # BLD AUTO: 8.1 K/UL (ref 1.8–7.7)
NEUTROPHILS NFR BLD: 80.1 % (ref 38–73)
PHOSPHATE SERPL-MCNC: 2.8 MG/DL (ref 2.7–4.5)
PLATELET # BLD AUTO: 237 K/UL (ref 150–350)
PMV BLD AUTO: 9.8 FL (ref 9.2–12.9)
POCT GLUCOSE: 153 MG/DL (ref 70–110)
POCT GLUCOSE: 157 MG/DL (ref 70–110)
POCT GLUCOSE: 203 MG/DL (ref 70–110)
POTASSIUM SERPL-SCNC: 3.6 MMOL/L (ref 3.5–5.1)
RBC # BLD AUTO: 5.36 M/UL (ref 4.6–6.2)
SODIUM SERPL-SCNC: 134 MMOL/L (ref 136–145)
WBC # BLD AUTO: 10.13 K/UL (ref 3.9–12.7)

## 2019-06-29 PROCEDURE — 80048 BASIC METABOLIC PNL TOTAL CA: CPT

## 2019-06-29 PROCEDURE — 97165 OT EVAL LOW COMPLEX 30 MIN: CPT

## 2019-06-29 PROCEDURE — 63600175 PHARM REV CODE 636 W HCPCS: Performed by: NURSE PRACTITIONER

## 2019-06-29 PROCEDURE — 94761 N-INVAS EAR/PLS OXIMETRY MLT: CPT

## 2019-06-29 PROCEDURE — 25000003 PHARM REV CODE 250: Performed by: NURSE PRACTITIONER

## 2019-06-29 PROCEDURE — G0378 HOSPITAL OBSERVATION PER HR: HCPCS

## 2019-06-29 PROCEDURE — 97116 GAIT TRAINING THERAPY: CPT

## 2019-06-29 PROCEDURE — 85025 COMPLETE CBC W/AUTO DIFF WBC: CPT

## 2019-06-29 PROCEDURE — 84100 ASSAY OF PHOSPHORUS: CPT

## 2019-06-29 PROCEDURE — 97162 PT EVAL MOD COMPLEX 30 MIN: CPT

## 2019-06-29 PROCEDURE — 36415 COLL VENOUS BLD VENIPUNCTURE: CPT

## 2019-06-29 PROCEDURE — 83735 ASSAY OF MAGNESIUM: CPT

## 2019-06-29 RX ADMIN — DRONEDARONE 400 MG: 400 TABLET, FILM COATED ORAL at 05:06

## 2019-06-29 RX ADMIN — METOPROLOL SUCCINATE 25 MG: 25 TABLET, FILM COATED, EXTENDED RELEASE ORAL at 09:06

## 2019-06-29 RX ADMIN — MEMANTINE HYDROCHLORIDE 10 MG: 5 TABLET ORAL at 09:06

## 2019-06-29 RX ADMIN — HYDROCHLOROTHIAZIDE 12.5 MG: 12.5 TABLET ORAL at 09:06

## 2019-06-29 RX ADMIN — FAMOTIDINE 20 MG: 20 TABLET ORAL at 09:06

## 2019-06-29 RX ADMIN — CETIRIZINE HYDROCHLORIDE 5 MG: 5 TABLET ORAL at 09:06

## 2019-06-29 RX ADMIN — DRONEDARONE 400 MG: 400 TABLET, FILM COATED ORAL at 09:06

## 2019-06-29 RX ADMIN — DABIGATRAN ETEXILATE MESYLATE 75 MG: 75 CAPSULE ORAL at 09:06

## 2019-06-29 RX ADMIN — INSULIN ASPART 2 UNITS: 100 INJECTION, SOLUTION INTRAVENOUS; SUBCUTANEOUS at 12:06

## 2019-06-29 NOTE — DISCHARGE SUMMARY
[facility] Hospital Medicine  Discharge Summary      Patient Name: Sherri Bunch  MRN: [id number]  Admission Date: 6/28/2019  Hospital Length of Stay: 1 days  Discharge Date and Time:  06/29/2019 6:26 PM  Attending Physician: Savage Diaz DO   Discharging Provider: Darling Taylor NP  Primary Care Provider: Ozzy Jerome MD      HPI:   89 y.o. male with past medical history of alzheimers, atrial fibrillation, diabetes type 2, hyperlipidemia and hypertension presents to ED with complaints of decreased appetite, nausea and vomiting that began yesterday afternoon and has been persistant.  Information obtained per patients son-n law.  Per son-n-law he missed two meals and also was not drinking water. He had no urine output last night.  Family is concerned about dehydration, patient had a similar episode like this two years ago in which he was hospitalized and diagnosed with dehydration. Patient denies fever, chills, abdominal pain, diarrhea, sick contacts, recent travel, chest pain, shortness of breath or cold symptoms.  He denies any aggravating or alleviating factors.  ED findings: Glucose 205, HgbA1C 7.6, Bilirubin 1.1.  Patient admitted to hospital medicine for observation.    * No surgery found *      Hospital Course:   No nausea, vomiting or abdominal pain, vital signs stable, patient tolerating po.  Okay to discharge.     Consults:     * Nausea and vomiting-resolved as of 6/29/2019  No Nausea and vomiting today, improved appetite, tolerating regular diet well.  Okay to discharge.          Vascular dementia without behavioral disturbance  Continue Namenda      Essential hypertension  Continue hydrochlorothiazide and metoprolol      DM (diabetes mellitus), type 2 with neurological complications  Hemoglobin A1c 7.6  Current    POC glucose checks ac meals and nightly                  Paroxysmal A-fib  Rate controlled   Continue Pradaxa, Metoprolol and Dronedarone  Continuous cardiac  monitoring        Final Active Diagnoses:    Diagnosis Date Noted POA    Vascular dementia without behavioral disturbance [F01.50]  Yes    Essential hypertension [I10] 08/18/2016 Yes    DM (diabetes mellitus), type 2 with neurological complications [E11.49]  Yes    Paroxysmal A-fib [I48.0]  Yes      Problems Resolved During this Admission:    Diagnosis Date Noted Date Resolved POA    PRINCIPAL PROBLEM:  Nausea and vomiting [R11.2] 06/28/2019 06/29/2019 Yes       Discharged Condition: stable    Disposition: Home or Self Care    Follow Up:  Follow-up Information     Schedule an appointment as soon as possible for a visit with Ozzy Jerome MD.    Specialty:  Family Medicine  Why:  Follow-Up / Offices closed for Weekend. Patient to schedule own follow up appointment.  Contact information:  [address] [facility] [address]  [phone]             [facility].    Specialty:  Home Health Services  Why:  Home Health nurse will contact patient to arrange a visit for Sunday 6/30/19  Contact information:  [address]  [address]  [place] [address]  [phone]                 Patient Instructions:      Diet diabetic     Notify your health care provider if you experience any of the following:  temperature >100.4     Notify your health care provider if you experience any of the following:  persistent nausea and vomiting or diarrhea     Notify your health care provider if you experience any of the following:  severe uncontrolled pain     Notify your health care provider if you experience any of the following:  difficulty breathing or increased cough     Notify your health care provider if you experience any of the following:  severe persistent headache     Notify your health care provider if you experience any of the following:  worsening rash     Notify your health care provider if you experience any of the following:  persistent dizziness, light-headedness, or visual disturbances     Notify  your health care provider if you experience any of the following:  increased confusion or weakness     Activity as tolerated       Significant Diagnostic Studies: Labs:   BMP:   Recent Labs   Lab 06/28/19  0951 06/29/19  0713   * 145*    134*   K 4.1 3.6    101   CO2 21* 25   BUN 22 16   CREATININE 1.0 0.9   CALCIUM 9.5 8.7   MG  --  1.8   , CMP   Recent Labs   Lab 06/28/19  0951 06/29/19  0713    134*   K 4.1 3.6    101   CO2 21* 25   * 145*   BUN 22 16   CREATININE 1.0 0.9   CALCIUM 9.5 8.7   PROT 7.7  --    ALBUMIN 3.6  --    BILITOT 1.1*  --    ALKPHOS 88  --    AST 33  --    ALT 17  --    ANIONGAP 15 8   ESTGFRAFRICA >60 >60   EGFRNONAA >60 >60    and CBC   Recent Labs   Lab 06/28/19  0951 06/29/19  0713   WBC 10.14 10.13   HGB 16.6 16.0   HCT 49.1 47.0    237       Pending Diagnostic Studies:     None         Medications:  Reconciled Home Medications:      Medication List      CONTINUE taking these medications    ALPRAZolam 0.25 MG tablet  Commonly known as:  XANAX  Take 1 tablet (0.25 mg total) by mouth nightly as needed for Anxiety.     CALCIUM 600 WITH VITAMIN D3 ORAL  Take 1 tablet by mouth once daily.     dronedarone 400 mg Tab  Commonly known as:  MULTAQ  Take 400 mg by mouth 2 (two) times daily with meals.     fish oil-omega-3 fatty acids 300-1,000 mg capsule  Take 1 g by mouth once daily.     hydroCHLOROthiazide 12.5 mg capsule  Commonly known as:  MICROZIDE  12.5 mg once daily.     levocetirizine 5 MG tablet  Commonly known as:  XYZAL  Take 1 tablet (5 mg total) by mouth every evening.     LYCOPENE ORAL  Take 5 mg by mouth once daily.     memantine 10 MG Tab  Commonly known as:  NAMENDA  Take 1 tablet (10 mg total) by mouth 2 (two) times daily.     metoprolol succinate 25 MG 24 hr tablet  Commonly known as:  TOPROL-XL     ONE DAILY MULTIVITAMIN per tablet  Generic drug:  multivitamin  Take 1 tablet by mouth once daily.     PRADAXA 75 mg Cap  Generic drug:   dabigatran etexilate     PROBIOTIC 10 billion cell Cap  Generic drug:  Lactobacillus acidophilus  Take by mouth once daily.     ranitidine 300 MG tablet  Commonly known as:  ZANTAC  TAKE 1 TABLET (300 MG TOTAL) BY MOUTH EVERY EVENING.            Indwelling Lines/Drains at time of discharge:   Lines/Drains/Airways          None          Time spent on the discharge of patient: 45 minutes  Patient was seen and examined on the date of discharge and determined to be suitable for discharge.         Darling Taylor NP  Department of Hospital Medicine  [facility]

## 2019-06-29 NOTE — PLAN OF CARE
Problem: Adult Inpatient Plan of Care  Goal: Plan of Care Review  Outcome: Ongoing (interventions implemented as appropriate)  Patient alert and confused. Patient safety maintained. Son and telesitter present at bedside. Scheduled medications administered per MD order. Patient very anxious and agitated. Prn Xanax administered with no relief of anxiety. Incontinence pad changed as needed. Will continue to monitor.

## 2019-06-29 NOTE — PLAN OF CARE
VN note: VN cued into patient's room. Patient's wife, son, and daughter-in-law at bedside. Nurse reports patient confused. Family does speak English. VN reviewed discharge papers. Medication list also reviewed. Follow-up information also given. VN provided education on dehydration and how to prevent reoccurrence. VN also provided education regarding diabetic diet and importance of follow-up with PCP to address lab. Daughter verbalized understanding and all questions answered. Refer to clinical references for further education. Transport requested.    VN notified son at bedside home health to be set up. They will contact them tomorrow--spoke with Inga .

## 2019-06-29 NOTE — HOSPITAL COURSE
No nausea, vomiting or abdominal pain, vital signs stable, patient tolerating po.  Okay to discharge.

## 2019-06-29 NOTE — PROGRESS NOTES
06/29/19 1459   Type of Frequent Check   Type Patient Rounds   Safety/Activity   Patient Rounds bed in low position;bed wheels locked;call light in patient/parent reach;clutter free environment maintained;ID band on;visualized patient   Positioning   Body Position positioned/repositioned independently   Pain/Comfort/Sleep   Preferred Pain Scale FACES (Sibley-Murdock FACES Pain Rating Scale)   FACES Pain Rating: Rest 0-->no hurt   FACES Pain Rating: Activity 0-->no hurt        Cardiac/Telemetry Details / Alarms   Cardiac/Telemetry Monitor On Yes   Assessments (Pre/Post)   Level of Consciousness (AVPU) alert

## 2019-06-29 NOTE — SUBJECTIVE & OBJECTIVE
Past Medical History:   Diagnosis Date    Atrial fibrillation     Diabetes     Diabetes mellitus, type 2     Hyperlipidemia     Hypertension        History reviewed. No pertinent surgical history.    Review of patient's allergies indicates:  No Known Allergies    No current facility-administered medications on file prior to encounter.      Current Outpatient Medications on File Prior to Encounter   Medication Sig    ALPRAZolam (XANAX) 0.25 MG tablet Take 1 tablet (0.25 mg total) by mouth nightly as needed for Anxiety.    CALCIUM CARBONATE/VITAMIN D3 (CALCIUM 600 WITH VITAMIN D3 ORAL) Take 1 tablet by mouth once daily.    dronedarone (MULTAQ) 400 mg Tab Take 400 mg by mouth 2 (two) times daily with meals.    fish oil-omega-3 fatty acids 300-1,000 mg capsule Take 1 g by mouth once daily.    hydrochlorothiazide (MICROZIDE) 12.5 mg capsule 12.5 mg once daily.     Lactobacillus acidophilus (PROBIOTIC) 10 billion cell Cap Take by mouth once daily.     levocetirizine (XYZAL) 5 MG tablet Take 1 tablet (5 mg total) by mouth every evening.    LYCOPENE ORAL Take 5 mg by mouth once daily.    memantine (NAMENDA) 10 MG Tab Take 1 tablet (10 mg total) by mouth 2 (two) times daily.    metoprolol succinate (TOPROL-XL) 25 MG 24 hr tablet     multivitamin (ONE DAILY MULTIVITAMIN) per tablet Take 1 tablet by mouth once daily.    PRADAXA 75 mg Cap     ranitidine (ZANTAC) 300 MG tablet TAKE 1 TABLET (300 MG TOTAL) BY MOUTH EVERY EVENING.     Family History     Problem Relation (Age of Onset)    Heart disease Sister, Brother    No Known Problems Daughter, Daughter        Tobacco Use    Smoking status: Former Smoker     Packs/day: 0.15     Start date: 1960     Last attempt to quit: 1970     Years since quittin.9    Smokeless tobacco: Never Used    Tobacco comment: Retired  in [place]   Substance and Sexual Activity    Alcohol use: No     Alcohol/week: 0.0 oz    Drug  use: No    Sexual activity: Not Currently     Partners: Female     Review of Systems   Unable to perform ROS: Dementia     Objective:     Vital Signs (Most Recent):  Temp: 97.8 °F (36.6 °C) (06/29/19 1559)  Pulse: 74 (06/29/19 1600)  Resp: 19 (06/29/19 1301)  BP: (!) 131/57 (06/29/19 1559)  SpO2: 95 % (06/29/19 1725) Vital Signs (24h Range):  Temp:  [94.5 °F (34.7 °C)-97.8 °F (36.6 °C)] 97.8 °F (36.6 °C)  Pulse:  [] 74  Resp:  [17-22] 19  SpO2:  [94 %-96 %] 95 %  BP: (104-188)/(57-90) 131/57     Weight: 71.3 kg (157 lb 3 oz)  Body mass index is 25.37 kg/m².    Physical Exam   Constitutional: He appears well-developed and well-nourished.   HENT:   Head: Normocephalic and atraumatic.   Eyes: EOM are normal.   Neck: Normal range of motion. Neck supple.   Cardiovascular: Normal rate.   Pulmonary/Chest: Effort normal and breath sounds normal.   Abdominal: Soft. Bowel sounds are normal.   Musculoskeletal: Normal range of motion.   Neurological: He is alert.   Skin: Skin is warm and dry.   Psychiatric: He has a normal mood and affect.         CRANIAL NERVES     CN III, IV, VI   Extraocular motions are normal.        Significant Labs:   BMP:   Recent Labs   Lab 06/29/19  0713   *   *   K 3.6      CO2 25   BUN 16   CREATININE 0.9   CALCIUM 8.7   MG 1.8     CBC:   Recent Labs   Lab 06/28/19  0951 06/29/19  0713   WBC 10.14 10.13   HGB 16.6 16.0   HCT 49.1 47.0    237     CMP:   Recent Labs   Lab 06/28/19  0951 06/29/19  0713    134*   K 4.1 3.6    101   CO2 21* 25   * 145*   BUN 22 16   CREATININE 1.0 0.9   CALCIUM 9.5 8.7   PROT 7.7  --    ALBUMIN 3.6  --    BILITOT 1.1*  --    ALKPHOS 88  --    AST 33  --    ALT 17  --    ANIONGAP 15 8   EGFRNONAA >60 >60     Magnesium:   Recent Labs   Lab 06/29/19  0713   MG 1.8       Significant Imaging: I have reviewed all pertinent imaging results/findings within the past 24 hours.

## 2019-06-29 NOTE — PLAN OF CARE
Problem: Physical Therapy Goal  Goal: Physical Therapy Goal  Goals to be met by: DC     Patient will increase functional independence with mobility by performin. Sit to stand transfer with Supervision  2. Bed to chair transfer with Supervision using Rolling Walker  3. Gait  x 100 feet with [place] and Contact Guard Assistance using Rolling Walker.   4. Lower extremity exercise program x12 reps per handout, with assistance as needed    Outcome: Ongoing (interventions implemented as appropriate)  PT initial evaluation completed. Plan of care and goals established and discussed with patient/daughter.  Pt required Mod A for supine <> sit; Mod A sit<>stand; Min Ax2 for gait for 50ft c/ RW c/ patient demonstrating reduced step length; stride length; wide KEITH; crouching stance and mild festinating gait pattern; poor safety awareness and cognitive deficits 2/2 advanced dementia.    Discharge Recommendation: HHPT  DME Recommendation: [facility] (Has recommended DME)

## 2019-06-29 NOTE — DISCHARGE INSTRUCTIONS
Vomiting (Adult) (English) View Edit Remove   Vomiting or Diarrhea (Adult), Diet for (English) View Edit Remove   Dehydration (Adult) (English) View Edit Remove     Diabetes, Diet (English) View Edit Remove

## 2019-06-29 NOTE — ASSESSMENT & PLAN NOTE
No Nausea and vomiting today, improved appetite, tolerating regular diet well.  Okay to discharge.

## 2019-06-29 NOTE — PLAN OF CARE
Problem: Adult Inpatient Plan of Care  Goal: Plan of Care Review  Outcome: Ongoing (interventions implemented as appropriate)  Pt on RA with documented sats.

## 2019-06-29 NOTE — PLAN OF CARE
[facility] HOME HEALTH ORDERS  FACE TO FACE ENCOUNTER    Patient Name: Sherri Bunch  YOB: 1927    PCP: Ozzy Jerome MD   PCP Address: [address] [facility] / [place]  PCP Phone Number: [phone]  PCP Fax: [phone]    Encounter Date: 06/29/2019    Admit to Home Health    Diagnoses:  Active Hospital Problems    Diagnosis  POA    Vascular dementia without behavioral disturbance [F01.50]  Yes    Essential hypertension [I10]  Yes    DM (diabetes mellitus), type 2 with neurological complications [E11.49]  Yes    Paroxysmal A-fib [I48.0]  Yes      Resolved Hospital Problems    Diagnosis Date Resolved POA    *Nausea and vomiting [R11.2] 06/29/2019 Yes       Future Appointments   Date Time Provider Department Center   8/31/2019  8:30 AM LAB, [facility] [place]   8/31/2019  8:45 AM SPECIMEN, [facility] [place]   9/9/2019  1:00 PM Ozzy Jerome MD [facility] [place]     Follow-up Information     Schedule an appointment as soon as possible for a visit with Ozzy Jerome MD.    Specialty:  Family Medicine  Why:  Follow-Up / Offices closed for Weekend. Patient to schedule own follow up appointment.  Contact information:  [address] [facility]  [place] [address]  [phone]                     I have seen and examined this patient face to face today. My clinical findings that support the need for the home health skilled services and home bound status are the following:  Weakness/numbness causing balance and gait disturbance due to Weakness/Debility making it taxing to leave home.    Allergies:Review of patient's allergies indicates:  No Known Allergies    Diet: diabetic diet: 2000 calorie    Activities: activity as tolerated    Nursing:   SN to complete comprehensive assessment including routine vital signs. Instruct on disease process and s/s of complications to report to MD. Review/verify medication list sent home with the patient at  time of discharge  and instruct patient/caregiver as needed. Frequency may be adjusted depending on start of care date.    Notify MD if SBP > 160 or < 90; DBP > 90 or < 50; HR > 120 or < 50; Temp > 101;      CONSULTS:    Physical Therapy to evaluate and treat. Evaluate for home safety and equipment needs; Establish/upgrade home exercise program. Perform / instruct on therapeutic exercises, gait training, transfer training, and Range of Motion.  Occupational Therapy to evaluate and treat. Evaluate home environment for safety and equipment needs. Perform/Instruct on transfers, ADL training, ROM, and therapeutic exercises.  Aide to provide assistance with personal care, ADLs, and vital signs.        Medications: Review discharge medications with patient and family and provide education.      Current Discharge Medication List      CONTINUE these medications which have NOT CHANGED    Details   ALPRAZolam (XANAX) 0.25 MG tablet Take 1 tablet (0.25 mg total) by mouth nightly as needed for Anxiety.  Qty: 30 tablet, Refills: 3    Associated Diagnoses: Vascular dementia without behavioral disturbance      CALCIUM CARBONATE/VITAMIN D3 (CALCIUM 600 WITH VITAMIN D3 ORAL) Take 1 tablet by mouth once daily.      dronedarone (MULTAQ) 400 mg Tab Take 400 mg by mouth 2 (two) times daily with meals.      fish oil-omega-3 fatty acids 300-1,000 mg capsule Take 1 g by mouth once daily.      hydrochlorothiazide (MICROZIDE) 12.5 mg capsule 12.5 mg once daily.       Lactobacillus acidophilus (PROBIOTIC) 10 billion cell Cap Take by mouth once daily.       levocetirizine (XYZAL) 5 MG tablet Take 1 tablet (5 mg total) by mouth every evening.  Qty: 30 tablet, Refills: 11      LYCOPENE ORAL Take 5 mg by mouth once daily.      memantine (NAMENDA) 10 MG Tab Take 1 tablet (10 mg total) by mouth 2 (two) times daily.  Qty: 60 tablet, Refills: 11    Associated Diagnoses: MCI (mild cognitive impairment) with memory loss      metoprolol succinate  (TOPROL-XL) 25 MG 24 hr tablet       multivitamin (ONE DAILY MULTIVITAMIN) per tablet Take 1 tablet by mouth once daily.      PRADAXA 75 mg Cap       ranitidine (ZANTAC) 300 MG tablet TAKE 1 TABLET (300 MG TOTAL) BY MOUTH EVERY EVENING.  Qty: 30 tablet, Refills: 11    Associated Diagnoses: Cough             I certify that this patient is confined to his home and needs intermittent skilled nursing care, physical therapy and occupational therapy.      ABEL Junior FNP-C   Nurse Practitioner- Hospitalist  Department of [facility] Medicine  [facility]

## 2019-06-29 NOTE — PLAN OF CARE
6:26 pm, HH orders sent and accepted via [employer] to [facility], HH nurse will contact pt to arrange a visit for Sunday, 6/30/19    5:35 pm, Madhavi Lowe with PT contacted [place] and stated that she recommends HH. [place] working on setting up      Discharge orders noted, no HH or HME ordered.    Future Appointments   Date Time Provider Department Center   8/31/2019  8:30 AM LAB, [facility] [place]   8/31/2019  8:45 AM SPE[facility]IMEN, [facility] [place]   9/9/2019  1:00 PM Ozzy Jerome MD [facility] [place]       Pt's nurse will go over medications/signs and symptoms prior to discharge       06/29/19 1721   Final Note   Assessment Type Final Discharge Note   Anticipated Discharge Disposition Home   What phone number can be called within the next 1-3 days to see how you are doing after discharge? [phone]   Hospital Follow Up  Appt(s) scheduled? No  (Offices closed for Weekend. Patient to schedule own follow up appointment.)   Right [facility] Referral Info   Post Acute Recommendation No Care     Inga Miner, RN Transitional Navigator  [phone]

## 2019-06-29 NOTE — NURSING
Patient refused to let phlebotomist draw blood for morning labs. Patient was also combative when trying to initiate new peripheral IV's. Notified NP on call with no new orders for anxiety interventions.  Will also notify oncoming nurse of patient's status and continue to monitor.

## 2019-06-30 NOTE — PT/OT/SLP EVAL
Physical Therapy Evaluation    Patient Name:  Sherri Bunch   MRN:  [id number]    Recommendations:     Discharge Recommendations:  home health PT, home health OT   Discharge Equipment Recommendations: none   Barriers to discharge: None    Assessment:     Sherri Bunch is a 89 y.o. male admitted with a medical diagnosis of Nausea and vomiting.  He presents with the following impairments/functional limitations:  weakness, impaired functional mobilty, impaired balance, impaired cognition, decreased safety awareness, impaired coordination, impaired endurance, impaired self care skills, gait instability, decreased coordination, decreased lower extremity function, decreased ROM, impaired fine motor, impaired joint extensibility. Pt required Mod A for supine <> sit; Mod A sit<>stand; Min Ax2 for gait for 50ft c/ RW c/ patient demonstrating reduced step length; stride length; wide KEITH; crouching stance and mild festinating gait pattern; poor safety awareness and cognitive deficits 2/2 advanced dementia.    Rehab Prognosis: Fair; patient would benefit from acute skilled PT services to address these deficits and reach maximum level of function.    Recent Surgery: * No surgery found *      Plan:     During this hospitalization, patient to be seen 5 x/week to address the identified rehab impairments via gait training, therapeutic activities, therapeutic exercises, neuromuscular re-education and progress toward the following goals:    · Plan of Care Expires:  07/29/19    Subjective     Chief Complaint: NA  Patient/Family Comments/goals: Agreed to PT POC  Pain/Comfort:  ·      Patients cultural, spiritual, Anabaptism conflicts given the current situation: no    Living Environment:  Pt lives c/ wife and daughter in 2SH; bed and bath access on 1st floor  Prior to admission, patients level of function was Mod I required supervision and SBA c/ functional mobility 2/2 dementia.  Equipment used at home: walker, rolling, rollator, wheelchair.   DME owned (not currently used): none.  Upon discharge, patient will have assistance from family.    Objective:     Communicated with RNLaura prior to session.  Patient found supine with bed alarm, SCD, telemetry  upon PT entry to room.    General Precautions: Standard, fall   Orthopedic Precautions:N/A   Braces:       Exams:  · Cognitive Exam:  Patient is oriented to Person and only and speaks Mandarin; daughter who was  r/o confused speech and inappropriate answers  · Gross Motor Coordination:  Impaired c/ gait  · Postural Exam:  Patient presented with the following abnormalities in standing c/ RW:    · -       Rounded shoulders  · -       Forward head  · -       Affected scapula downwardly rotated  · -       Abnormal trunk flexion  · -       Kyphosis  · RLE ROM: WFL  · RLE Strength: WFL  · LLE ROM: WFL  · LLE Strength: WFL    Functional Mobility:  · Bed Mobility:     · Supine to Sit: moderate assistance  · Sit to Supine: moderate assistance  · Transfers:     · Sit to Stand:  moderate assistance with rolling walker  · Gait:  Min Ax2 for gait for 50ft c/ RW c/ patient demonstrating reduced step length; stride length; wide KEITH; crouching stance and mild festinating gait pattern; poor safety awareness and cognitive deficits 2/2 advanced dementia.  · Balance: Dynamic gait: fair-      Therapeutic Activities and Exercises:   PT eval completed c/ progressive mobility and gait training as detailed above.     AM-PAC 6 CLICK MOBILITY  Total Score:      Patient left supine with call button in reach, bed alarm on, RN notified and wife/daughter present.    GOALS:   Multidisciplinary Problems     Physical Therapy Goals     Not on file          Multidisciplinary Problems (Resolved)        Problem: Physical Therapy Goal    Goal Priority Disciplines Outcome Goal Variances Interventions   Physical Therapy Goal   (Resolved)     PT, PT/OT Outcome(s) achieved     Description:  Goals to be met by: DC     Patient will  increase functional independence with mobility by performin. Sit to stand transfer with Supervision  2. Bed to chair transfer with Supervision using Rolling Walker  3. Gait  x 100 feet with [place] and Contact Guard Assistance using Rolling Walker.   4. Lower extremity exercise program x12 reps per handout, with assistance as needed                      History:     Past Medical History:   Diagnosis Date    Atrial fibrillation     Diabetes     Diabetes mellitus, type 2     Hyperlipidemia     Hypertension        History reviewed. No pertinent surgical history.    Time Tracking:     PT Received On: 19  PT Start Time: 1134     PT Stop Time: 1157  PT Total Time (min): 23 min co Tx c/ OT    Billable Minutes: Evaluation 10 and Gait Training 13      Chrissy Hendrickson, PT  2019

## 2019-06-30 NOTE — PT/OT/SLP EVAL
Occupational Therapy   Evaluation and discharge  Daughter served as  in chinese language    Name: Sherri Bunch  MRN: [id number]  Admitting Diagnosis:  Nausea and vomiting     The primary encounter diagnosis was Nausea and vomiting, intractability of vomiting not specified, unspecified vomiting type. A diagnosis of Generalized weakness was also pertinent to this visit.      Recommendations:     Discharge Recommendations: home health OT, home with home health, home health PT  Discharge Equipment Recommendations:  grab bar, tub/shower  Barriers to discharge:  None    Assessment:     Sherri Bunch is a 89 y.o. male with a medical diagnosis of Nausea and vomiting.  He presents with fidgety, nervous and anxious behavior but daiughter able to redirect him for OT eval.  Pt has hx ALZ and lives with wife and daughter.  He would be better served to discharge back home with HHOT and HH aide as pt may thrive in familiar environment at home.  DC acute OT at this. Daughter agrees and ok with HH follow up and back home. Family has DME  At home.  Performance deficits affecting function: impaired self care skills, weakness, impaired balance, impaired endurance, impaired functional mobilty, gait instability, impaired cognition, decreased coordination, decreased safety awareness.      Rehab Prognosis: Fair; patient would benefit from acute skilled OT services to address these deficits and reach maximum level of function.       Plan:     Patient to be seen   to address the above listed problems via    · Plan of Care Expires:    · Plan of Care Reviewed with: spouse, patient, daughter    Subjective     Chief Complaint:none  Patient/Family Comments/goals: family wants pt. To go home with them.    Occupational Profile:pt has tub/shower combo with GB.  Living Environment: lives with wife and daughter in 2SH but bed and bathroom on first floor;   Previous level of function: assisted with all ADLS;   Roles and Routines: sedentary  Equipment  Used at Home:  wheelchair, walker, rolling, rollator  Assistance upon Discharge: family    Pain/Comfort:  ·  none    Patients cultural, spiritual, Yazidi conflicts given the current situation: no    Objective:     Communicated with:  prior to session.  Patient found HOB elevated with bed alarm, telemetry, SCD(MELLO navaser) upon OT entry to room.    General Precautions: Standard, fall   Orthopedic Precautions:N/A   Braces:       Occupational Performance:    Bed Mobility:    · Patient completed Rolling/Turning to Left with  moderate assistance  · Patient completed Scooting/Bridging with moderate assistance  · Patient completed Supine to Sit with moderate assistance  · Patient completed Sit to Supine with moderate assistance    Functional Mobility/Transfers:  · Patient completed Sit <> Stand Transfer with moderate assistance  With RW  · Functional mobility: mod  assist with Rw -pushes walker too far ahead of him when walking; 2nd person assist for safety    Activities of Daily Living:  · Feeding:  moderate assistance fed self 1 cup jello otherwise has to be asisted to increase oral intake  · Grooming: total assistance would not comply with washing face; por cognition and distracted and confused; easily agitated  · Upper Body Dressing: total assistance .  · Lower Body Dressing: total assistance .  · Toileting: total assistance .    Cognitive/Visual Perceptual:  Cognitive/Psychosocial Skills:     -       Oriented to: Person   -       Follows Commands/attention:Inattentive and Easily distracted  -       Communication: confused speech per daughter as she tranlated   -       Memory: Poor memory  -       Safety awareness/insight to disability: poor  -       Mood/Affect/Coping skills/emotional control: anxious, fidgeting with covers/sheets in bed, nervous.  Visual/Perceptual:  wfl    Physical Exam:  Balance:    -       sitting:  fair  dynamic;  poor standing: fair-  dynamic:  poor  Postural examination/scapula alignment:     -       Rounded shoulders  Dominant hand:    -       right  Upper Extremity Range of Motion:     -       Right Upper Extremity: not able to follw command for testing but did move arms 50% against gravity  -       Left Upper Extremity: same as above  Upper Extremity Strength:    -       Right Upper Extremity: same as above}  -       Left Upper Extremity: same as above   Strength:    -       Right Upper Extremity: same as ab  -       Left Upper Extremity: same as above    AMPAC 6 Click ADL:  [facility] Total Score:      Treatment & Education:    Role of OT .  OT initial eval completed anddaughter informed of OT discharge recommendations for home with HH.   Daughter verbalized understanding. Dc OT 2/2 pt discharge home with family today.       Education:    Patient left HOB elevated with all lines intact, call button in reach, bed alarm on, nurse notified and family present    GOALS:   Multidisciplinary Problems     Occupational Therapy Goals     Not on file          Multidisciplinary Problems (Resolved)        Problem: Occupational Therapy Goal    Goal Priority Disciplines Outcome Interventions   Occupational Therapy Goal   (Resolved)     OT, PT/OT Outcome(s) achieved                    History:     Past Medical History:   Diagnosis Date    Atrial fibrillation     Diabetes     Diabetes mellitus, type 2     Hyperlipidemia     Hypertension        History reviewed. No pertinent surgical history.    Time Tracking:     OT Date of Treatment: 06/29/19  OT Start Time: 1135  OT Stop Time: 1154  OT Total Time (min): 19 min    Billable Minutes:Evaluation 19  Total Time 19    Adalgisa Bermudez OT  6/30/2019

## 2019-06-30 NOTE — PLAN OF CARE
Problem: Occupational Therapy Goal  Goal: Occupational Therapy Goal  Outcome: Outcome(s) achieved Date Met: 06/29/19  OT initial eval completed and recommendations mde for HHOT posy discharge.  Daughter verbalized understanding. Dc OT 2/2 pt discharge home with family today.

## 2019-07-01 DIAGNOSIS — R07.9 CHEST PAIN, UNSPECIFIED TYPE: Primary | ICD-10-CM

## 2019-07-16 ENCOUNTER — OFFICE VISIT (OUTPATIENT)
Dept: INTERNAL MEDICINE | Facility: CLINIC | Age: 84
End: 2019-07-16
Payer: COMMERCIAL

## 2019-07-16 VITALS
BODY MASS INDEX: 23.69 KG/M2 | WEIGHT: 156.31 LBS | DIASTOLIC BLOOD PRESSURE: 60 MMHG | HEART RATE: 72 BPM | SYSTOLIC BLOOD PRESSURE: 122 MMHG | TEMPERATURE: 98 F | HEIGHT: 68 IN | RESPIRATION RATE: 21 BRPM

## 2019-07-16 DIAGNOSIS — I10 ESSENTIAL HYPERTENSION: ICD-10-CM

## 2019-07-16 DIAGNOSIS — E11.49 DM (DIABETES MELLITUS), TYPE 2 WITH NEUROLOGICAL COMPLICATIONS: ICD-10-CM

## 2019-07-16 DIAGNOSIS — F01.50 VASCULAR DEMENTIA WITHOUT BEHAVIORAL DISTURBANCE: ICD-10-CM

## 2019-07-16 DIAGNOSIS — Z09 HOSPITAL DISCHARGE FOLLOW-UP: Primary | ICD-10-CM

## 2019-07-16 DIAGNOSIS — R11.2 NAUSEA AND VOMITING, INTRACTABILITY OF VOMITING NOT SPECIFIED, UNSPECIFIED VOMITING TYPE: ICD-10-CM

## 2019-07-16 DIAGNOSIS — Z86.73 OLD CEREBROVASCULAR ACCIDENT (CVA) WITHOUT LATE EFFECT: ICD-10-CM

## 2019-07-16 DIAGNOSIS — E86.0 DEHYDRATION: ICD-10-CM

## 2019-07-16 DIAGNOSIS — I48.0 PAROXYSMAL A-FIB: ICD-10-CM

## 2019-07-16 PROBLEM — J18.9 CAP (COMMUNITY ACQUIRED PNEUMONIA): Status: RESOLVED | Noted: 2017-08-25 | Resolved: 2019-07-16

## 2019-07-16 PROCEDURE — 99999 PR PBB SHADOW E&M-EST. PATIENT-LVL III: CPT | Mod: PBBFAC,,, | Performed by: FAMILY MEDICINE

## 2019-07-16 PROCEDURE — 99214 OFFICE O/P EST MOD 30 MIN: CPT | Mod: S$GLB,,, | Performed by: FAMILY MEDICINE

## 2019-07-16 PROCEDURE — 99214 PR OFFICE/OUTPT VISIT, EST, LEVL IV, 30-39 MIN: ICD-10-PCS | Mod: S$GLB,,, | Performed by: FAMILY MEDICINE

## 2019-07-16 PROCEDURE — 99999 PR PBB SHADOW E&M-EST. PATIENT-LVL III: ICD-10-PCS | Mod: PBBFAC,,, | Performed by: FAMILY MEDICINE

## 2019-07-16 NOTE — PROGRESS NOTES
Transitional Care Note  Subjective:       Patient ID: Sherri Bunch is a 89 y.o. male.  Chief Complaint: Follow-up (hospital) and Leg Swelling    Family and/or Caretaker present at visit?  Yes.  Diagnostic tests reviewed/disposition: I have reviewed all completed as well as pending diagnostic tests at the time of discharge.  Disease/illness education:  Dehydration, atrial fibrillation, hypertension, and type 2 diabetes.  Home health/community services discussion/referrals: Patient has home health established at [facility].   Establishment or re-establishment of referral orders for community resources: No other necessary community resources.   Discussion with other health care providers: No discussion with other health care providers necessary.   HPI 89-year-old Chinese male presents to clinic today accompanied by his wife and daughter for hospital follow-up secondary to admission secondary to dehydration from nausea and vomiting.  He was admitted on June 28, 2019 and discharged the following day secondary to episodes of decreased appetite with associated nausea and vomiting.  The patient was noted to be dehydrated upon admission.  Glucose was elevated at 205 and A1c was 7.6.  A1c is down from last year.  Hydration has been stressed and at this time kidney functions are stable.  They continue to note occasional bilateral lower extremity swelling that does improve at rest.  Review of Systems   Constitutional: Positive for fatigue. Negative for appetite change, chills and fever.   HENT: Negative for congestion, ear pain, hearing loss, postnasal drip, rhinorrhea, sinus pressure, sore throat and tinnitus.    Eyes: Negative for redness, itching and visual disturbance.   Respiratory: Negative for cough, chest tightness and shortness of breath.    Cardiovascular: Positive for leg swelling. Negative for chest pain and palpitations.   Gastrointestinal: Negative for abdominal pain, constipation, diarrhea, nausea and  vomiting.   Genitourinary: Negative for decreased urine volume, difficulty urinating, dysuria, frequency, hematuria and urgency.   Musculoskeletal: Positive for myalgias. Negative for back pain, neck pain and neck stiffness.   Skin: Negative for rash.   Neurological: Positive for weakness. Negative for dizziness, light-headedness and headaches.   Psychiatric/Behavioral: Positive for confusion.       Objective:      Physical Exam   Constitutional: He is oriented to person, place, and time. He appears well-developed and well-nourished. No distress.   HENT:   Head: Normocephalic and atraumatic.   Right Ear: External ear normal.   Left Ear: External ear normal.   Nose: Nose normal.   Mouth/Throat: Oropharynx is clear and moist. No oropharyngeal exudate.   Eyes: Pupils are equal, round, and reactive to light. Conjunctivae and EOM are normal. Right eye exhibits no discharge. Left eye exhibits no discharge. No scleral icterus.   Neck: Normal range of motion. Neck supple. No JVD present. No tracheal deviation present. No thyromegaly present.   Cardiovascular: Normal rate, regular rhythm, normal heart sounds and intact distal pulses. Exam reveals no gallop and no friction rub.   No murmur heard.  Pulmonary/Chest: Effort normal and breath sounds normal. No stridor. No respiratory distress. He has no wheezes. He has no rales.   Abdominal: Soft. Bowel sounds are normal. He exhibits no distension and no mass. There is no tenderness. There is no rebound and no guarding.   Musculoskeletal: Normal range of motion. He exhibits no edema or tenderness.   Lymphadenopathy:     He has no cervical adenopathy.   Neurological: He is alert and oriented to person, place, and time.   Skin: Skin is warm and dry. No rash noted. He is not diaphoretic. No erythema. No pallor.   Psychiatric: He has a normal mood and affect. His behavior is normal. Judgment and thought content normal.   Nursing note and vitals reviewed.      Assessment:       1.  Hospital discharge follow-up    2. Dehydration    3. Nausea and vomiting, intractability of vomiting not specified, unspecified vomiting type    4. Vascular dementia without behavioral disturbance    5. Paroxysmal A-fib    6. Essential hypertension    7. Old cerebrovascular accident (CVA) without late effect    8. DM (diabetes mellitus), type 2 with neurological complications        Plan:         1.  Hospital records have been reviewed.  2.  Encourage hydration.  3.  Nausea and vomiting has resolved.  4.  Continue Namenda as prescribed.  5.  Continue Pradaxa, Multaq, metoprolol, and hydrochlorothiazide as prescribed.  Atrial fibrillation and hypertension remain well controlled.  6.  Patient has a previous history of CVA which is currently stable.  7.  A1c is 7.6.  Recommend continued diabetic diet.  8.  Return to clinic as needed or in 3 months for annual exam.

## 2019-08-31 ENCOUNTER — LAB VISIT (OUTPATIENT)
Dept: LAB | Facility: HOSPITAL | Age: 84
End: 2019-08-31
Attending: FAMILY MEDICINE
Payer: COMMERCIAL

## 2019-08-31 DIAGNOSIS — F01.50 VASCULAR DEMENTIA WITHOUT BEHAVIORAL DISTURBANCE: ICD-10-CM

## 2019-08-31 DIAGNOSIS — E11.69 ONYCHOMYCOSIS OF MULTIPLE TOENAILS WITH TYPE 2 DIABETES MELLITUS AND PERIPHERAL NEUROPATHY: ICD-10-CM

## 2019-08-31 DIAGNOSIS — Z86.73 OLD CEREBROVASCULAR ACCIDENT (CVA) WITHOUT LATE EFFECT: ICD-10-CM

## 2019-08-31 DIAGNOSIS — N13.8 BPH WITH URINARY OBSTRUCTION: ICD-10-CM

## 2019-08-31 DIAGNOSIS — I48.0 PAROXYSMAL A-FIB: ICD-10-CM

## 2019-08-31 DIAGNOSIS — E11.49 DM (DIABETES MELLITUS), TYPE 2 WITH NEUROLOGICAL COMPLICATIONS: ICD-10-CM

## 2019-08-31 DIAGNOSIS — I10 ESSENTIAL HYPERTENSION: ICD-10-CM

## 2019-08-31 DIAGNOSIS — N40.1 BPH WITH URINARY OBSTRUCTION: ICD-10-CM

## 2019-08-31 DIAGNOSIS — E55.9 VITAMIN D DEFICIENCY: ICD-10-CM

## 2019-08-31 DIAGNOSIS — N39.41 URGENCY INCONTINENCE: ICD-10-CM

## 2019-08-31 DIAGNOSIS — J18.9 COMMUNITY ACQUIRED PNEUMONIA, UNSPECIFIED LATERALITY: ICD-10-CM

## 2019-08-31 DIAGNOSIS — B35.1 ONYCHOMYCOSIS OF MULTIPLE TOENAILS WITH TYPE 2 DIABETES MELLITUS AND PERIPHERAL NEUROPATHY: ICD-10-CM

## 2019-08-31 DIAGNOSIS — E11.42 ONYCHOMYCOSIS OF MULTIPLE TOENAILS WITH TYPE 2 DIABETES MELLITUS AND PERIPHERAL NEUROPATHY: ICD-10-CM

## 2019-08-31 DIAGNOSIS — Z00.00 PREVENTATIVE HEALTH CARE: ICD-10-CM

## 2019-08-31 DIAGNOSIS — M85.80 OSTEOPENIA, UNSPECIFIED LOCATION: ICD-10-CM

## 2019-08-31 LAB
ALBUMIN/CREAT UR: 12.1 UG/MG (ref 0–30)
BILIRUB UR QL STRIP: NEGATIVE
CLARITY UR: CLEAR
COLOR UR: YELLOW
CREAT UR-MCNC: 124 MG/DL (ref 23–375)
GLUCOSE UR QL STRIP: ABNORMAL
HGB UR QL STRIP: NEGATIVE
KETONES UR QL STRIP: NEGATIVE
LEUKOCYTE ESTERASE UR QL STRIP: NEGATIVE
MICROALBUMIN UR DL<=1MG/L-MCNC: 15 UG/ML
NITRITE UR QL STRIP: NEGATIVE
PH UR STRIP: 6 [PH] (ref 5–8)
PROT UR QL STRIP: NEGATIVE
SP GR UR STRIP: 1.02 (ref 1–1.03)
URN SPEC COLLECT METH UR: ABNORMAL
UROBILINOGEN UR STRIP-ACNC: NEGATIVE EU/DL

## 2019-08-31 PROCEDURE — 82043 UR ALBUMIN QUANTITATIVE: CPT

## 2019-08-31 PROCEDURE — 81003 URINALYSIS AUTO W/O SCOPE: CPT

## 2019-09-09 ENCOUNTER — OFFICE VISIT (OUTPATIENT)
Dept: INTERNAL MEDICINE | Facility: CLINIC | Age: 84
End: 2019-09-09
Payer: COMMERCIAL

## 2019-09-09 VITALS
BODY MASS INDEX: 23.26 KG/M2 | RESPIRATION RATE: 18 BRPM | SYSTOLIC BLOOD PRESSURE: 120 MMHG | HEART RATE: 75 BPM | DIASTOLIC BLOOD PRESSURE: 60 MMHG | TEMPERATURE: 98 F | WEIGHT: 153.44 LBS | HEIGHT: 68 IN

## 2019-09-09 DIAGNOSIS — Z86.73 OLD CEREBROVASCULAR ACCIDENT (CVA) WITHOUT LATE EFFECT: ICD-10-CM

## 2019-09-09 DIAGNOSIS — E55.9 VITAMIN D DEFICIENCY: ICD-10-CM

## 2019-09-09 DIAGNOSIS — I48.0 PAROXYSMAL A-FIB: ICD-10-CM

## 2019-09-09 DIAGNOSIS — I10 ESSENTIAL HYPERTENSION: ICD-10-CM

## 2019-09-09 DIAGNOSIS — N13.8 BPH WITH URINARY OBSTRUCTION: ICD-10-CM

## 2019-09-09 DIAGNOSIS — B35.1 ONYCHOMYCOSIS OF MULTIPLE TOENAILS WITH TYPE 2 DIABETES MELLITUS AND PERIPHERAL NEUROPATHY: ICD-10-CM

## 2019-09-09 DIAGNOSIS — E11.69 ONYCHOMYCOSIS OF MULTIPLE TOENAILS WITH TYPE 2 DIABETES MELLITUS AND PERIPHERAL NEUROPATHY: ICD-10-CM

## 2019-09-09 DIAGNOSIS — E11.42 ONYCHOMYCOSIS OF MULTIPLE TOENAILS WITH TYPE 2 DIABETES MELLITUS AND PERIPHERAL NEUROPATHY: ICD-10-CM

## 2019-09-09 DIAGNOSIS — E11.49 DM (DIABETES MELLITUS), TYPE 2 WITH NEUROLOGICAL COMPLICATIONS: ICD-10-CM

## 2019-09-09 DIAGNOSIS — F01.50 VASCULAR DEMENTIA WITHOUT BEHAVIORAL DISTURBANCE: ICD-10-CM

## 2019-09-09 DIAGNOSIS — Z00.00 PREVENTATIVE HEALTH CARE: Primary | ICD-10-CM

## 2019-09-09 DIAGNOSIS — N40.1 BPH WITH URINARY OBSTRUCTION: ICD-10-CM

## 2019-09-09 PROCEDURE — 99999 PR PBB SHADOW E&M-EST. PATIENT-LVL III: ICD-10-PCS | Mod: PBBFAC,,, | Performed by: FAMILY MEDICINE

## 2019-09-09 PROCEDURE — 99397 PR PREVENTIVE VISIT,EST,65 & OVER: ICD-10-PCS | Mod: S$GLB,,, | Performed by: FAMILY MEDICINE

## 2019-09-09 PROCEDURE — 99999 PR PBB SHADOW E&M-EST. PATIENT-LVL III: CPT | Mod: PBBFAC,,, | Performed by: FAMILY MEDICINE

## 2019-09-09 PROCEDURE — 99397 PER PM REEVAL EST PAT 65+ YR: CPT | Mod: S$GLB,,, | Performed by: FAMILY MEDICINE

## 2019-09-09 NOTE — PROGRESS NOTES
Subjective:       Patient ID: Sherri Bunch is a 89 y.o. male.    Chief Complaint: Annual Exam    HPI 89-year-old Chinese male presents to clinic today accompanied by his daughter for annual physical exam.  He continues to be followed by Cardiology at [facility] for continued treatment of atrial fibrillation which remains stable on Pradaxa, Multaq, hydrochlorothiazide, and metoprolol.  He continues to have diet-controlled type 2 diabetes with most recent A1c of 7.0.  He continues to be followed by Neurology secondary to mild cognitive impairment which remains stable on Namenda daily.  He continues to see Podiatry for continued treatment of peripheral neuropathy and onychomycosis to the bilateral feet which remain stable.  They report no significant past surgical history.  He has a family history of heart disease. He is up-to-date with all vaccinations.  Review of Systems   Constitutional: Negative for appetite change, chills, fatigue and fever.   HENT: Negative for congestion, ear pain, hearing loss, postnasal drip, rhinorrhea, sinus pressure, sore throat and tinnitus.    Eyes: Negative for redness, itching and visual disturbance.   Respiratory: Negative for cough, chest tightness and shortness of breath.    Cardiovascular: Negative for chest pain and palpitations.   Gastrointestinal: Negative for abdominal pain, constipation, diarrhea, nausea and vomiting.   Genitourinary: Negative for decreased urine volume, difficulty urinating, dysuria, frequency, hematuria and urgency.   Musculoskeletal: Negative for back pain, myalgias, neck pain and neck stiffness.   Skin: Negative for rash.   Neurological: Negative for dizziness, light-headedness and headaches.   Psychiatric/Behavioral: Negative.        Objective:      Physical Exam   Constitutional: He is oriented to person, place, and time. He appears well-developed and well-nourished. No distress.   HENT:   Head: Normocephalic and atraumatic.   Right Ear: External ear  normal.   Left Ear: External ear normal.   Nose: Nose normal.   Mouth/Throat: Oropharynx is clear and moist. No oropharyngeal exudate.   Eyes: Pupils are equal, round, and reactive to light. Conjunctivae and EOM are normal. Right eye exhibits no discharge. Left eye exhibits no discharge. No scleral icterus.   Neck: Normal range of motion. Neck supple. No JVD present. No tracheal deviation present. No thyromegaly present.   Cardiovascular: Normal rate, regular rhythm, normal heart sounds and intact distal pulses. Exam reveals no gallop and no friction rub.   No murmur heard.  Pulmonary/Chest: Effort normal and breath sounds normal. No stridor. No respiratory distress. He has no wheezes. He has no rales.   Abdominal: Soft. Bowel sounds are normal. He exhibits no distension and no mass. There is no tenderness. There is no rebound and no guarding.   Musculoskeletal: Normal range of motion. He exhibits no edema or tenderness.   Lymphadenopathy:     He has no cervical adenopathy.   Neurological: He is alert and oriented to person, place, and time.   Skin: Skin is warm and dry. No rash noted. He is not diaphoretic. No erythema. No pallor.   Psychiatric: He has a normal mood and affect. His behavior is normal. Judgment and thought content normal.   Nursing note and vitals reviewed.      Assessment:       1. Preventative health care    2. DM (diabetes mellitus), type 2 with neurological complications    3. Onychomycosis of multiple toenails with type 2 diabetes mellitus and peripheral neuropathy    4. Essential hypertension    5. Paroxysmal A-fib    6. Old cerebrovascular accident (CVA) without late effect    7. Vascular dementia without behavioral disturbance    8. BPH with urinary obstruction    9. Vitamin D deficiency        Plan:       1.  Labs have been reviewed and are within normal limits.  2.  Continue healthy diet.  Diabetes is diet controlled with A1c of 7.0.  3.  Continue follow-up with Podiatry as scheduled.   Peripheral neuropathy and onychomycosis remained stable.  4.  Continue Multaq, Pradaxa, metoprolol, and hydrochlorothiazide as prescribed.  Atrial fibrillation and hypertension are well controlled.  Continue follow-up with Cardiology as scheduled.  5.  Patient has a previous history of CVA which remains stable on Pradaxa.  6.  Continue Namenda as prescribed by Neurology and continue follow-up with Neurology as scheduled.  Vascular dementia remained stable.  7.  BPH is stable.  8.  Continue multivitamins daily.  Vitamin-D deficiency is well controlled.  9.  Return to clinic as needed or in 1 year for annual exam.

## 2019-09-18 ENCOUNTER — PATIENT MESSAGE (OUTPATIENT)
Dept: INTERNAL MEDICINE | Facility: CLINIC | Age: 84
End: 2019-09-18

## 2019-09-18 DIAGNOSIS — R10.84 GENERALIZED ABDOMINAL PAIN: ICD-10-CM

## 2019-09-18 DIAGNOSIS — I48.0 PAROXYSMAL A-FIB: ICD-10-CM

## 2019-09-18 DIAGNOSIS — Z11.1 SCREENING-PULMONARY TB: Primary | ICD-10-CM

## 2019-09-19 NOTE — TELEPHONE ENCOUNTER
EKG, chest x-ray, and QuantiFERON gold ordered.  Patient has already had fasting labs performed during his annual.  Copy provided.  Please inform the patient.  Thank you.

## 2019-09-24 ENCOUNTER — HOSPITAL ENCOUNTER (OUTPATIENT)
Dept: RADIOLOGY | Facility: HOSPITAL | Age: 84
Discharge: HOME | End: 2019-09-24
Attending: FAMILY MEDICINE
Payer: COMMERCIAL

## 2019-09-24 ENCOUNTER — CLINICAL SUPPORT (OUTPATIENT)
Dept: INTERNAL MEDICINE | Facility: CLINIC | Age: 84
End: 2019-09-24
Payer: COMMERCIAL

## 2019-09-24 DIAGNOSIS — R10.84 GENERALIZED ABDOMINAL PAIN: ICD-10-CM

## 2019-09-24 DIAGNOSIS — Z11.1 SCREENING-PULMONARY TB: ICD-10-CM

## 2019-09-24 DIAGNOSIS — I10 ESSENTIAL HYPERTENSION: Primary | ICD-10-CM

## 2019-09-24 PROCEDURE — 93010 ELECTROCARDIOGRAM REPORT: CPT | Mod: S$GLB,,, | Performed by: INTERNAL MEDICINE

## 2019-09-24 PROCEDURE — 71046 X-RAY EXAM CHEST 2 VIEWS: CPT | Mod: 26,,, | Performed by: RADIOLOGY

## 2019-09-24 PROCEDURE — 93005 ELECTROCARDIOGRAM TRACING: CPT | Mod: S$GLB,,, | Performed by: FAMILY MEDICINE

## 2019-09-24 PROCEDURE — 76700 US ABDOMEN COMPLETE: ICD-10-PCS | Mod: 26,,, | Performed by: RADIOLOGY

## 2019-09-24 PROCEDURE — 93010 EKG 12-LEAD: ICD-10-PCS | Mod: S$GLB,,, | Performed by: INTERNAL MEDICINE

## 2019-09-24 PROCEDURE — 71046 X-RAY EXAM CHEST 2 VIEWS: CPT | Mod: TC

## 2019-09-24 PROCEDURE — 76700 US EXAM ABDOM COMPLETE: CPT | Mod: TC

## 2019-09-24 PROCEDURE — 76700 US EXAM ABDOM COMPLETE: CPT | Mod: 26,,, | Performed by: RADIOLOGY

## 2019-09-24 PROCEDURE — 93005 EKG 12-LEAD: ICD-10-PCS | Mod: S$GLB,,, | Performed by: FAMILY MEDICINE

## 2019-09-24 PROCEDURE — 71046 XR CHEST PA AND LATERAL: ICD-10-PCS | Mod: 26,,, | Performed by: RADIOLOGY

## 2019-09-24 NOTE — PROGRESS NOTES
EKG was done in wheelchair for nursing home placement in [place].  Waiting on all results to come back & then will give pt all results to send to [facility]

## 2019-09-25 ENCOUNTER — PATIENT MESSAGE (OUTPATIENT)
Dept: INTERNAL MEDICINE | Facility: CLINIC | Age: 84
End: 2019-09-25

## 2019-09-25 DIAGNOSIS — R76.12 POSITIVE QUANTIFERON-TB GOLD TEST: Primary | ICD-10-CM

## 2019-09-25 NOTE — TELEPHONE ENCOUNTER
Please schedule and Infectious Disease appointment for further evaluation and treatment of positive QuantiFERON gold.  Please inform the patient.  Thank you.

## 2019-09-25 NOTE — TELEPHONE ENCOUNTER
Spoke with pt's daughter re: QF Gold positive.   Referring pt to ID in order to get clearance for nursing home in [place]    Sent referral to coordinators

## 2019-10-06 ENCOUNTER — PATIENT MESSAGE (OUTPATIENT)
Dept: INTERNAL MEDICINE | Facility: CLINIC | Age: 84
End: 2019-10-06

## 2019-10-16 ENCOUNTER — PATIENT OUTREACH (OUTPATIENT)
Dept: ADMINISTRATIVE | Facility: OTHER | Age: 84
End: 2019-10-16

## 2019-10-16 DIAGNOSIS — E11.49 DM (DIABETES MELLITUS), TYPE 2 WITH NEUROLOGICAL COMPLICATIONS: Primary | ICD-10-CM

## 2019-10-18 ENCOUNTER — OFFICE VISIT (OUTPATIENT)
Dept: INFECTIOUS DISEASES | Facility: CLINIC | Age: 84
End: 2019-10-18
Payer: COMMERCIAL

## 2019-10-18 VITALS
BODY MASS INDEX: 23.32 KG/M2 | DIASTOLIC BLOOD PRESSURE: 73 MMHG | SYSTOLIC BLOOD PRESSURE: 157 MMHG | HEART RATE: 70 BPM | WEIGHT: 153.88 LBS | TEMPERATURE: 98 F | HEIGHT: 68 IN

## 2019-10-18 DIAGNOSIS — F03.90 DEMENTIA WITHOUT BEHAVIORAL DISTURBANCE, UNSPECIFIED DEMENTIA TYPE: ICD-10-CM

## 2019-10-18 DIAGNOSIS — I48.91 ATRIAL FIBRILLATION, UNSPECIFIED TYPE: ICD-10-CM

## 2019-10-18 DIAGNOSIS — Z22.7 TB LUNG, LATENT: Primary | ICD-10-CM

## 2019-10-18 PROCEDURE — 99204 PR OFFICE/OUTPT VISIT, NEW, LEVL IV, 45-59 MIN: ICD-10-PCS | Mod: S$GLB,,, | Performed by: INTERNAL MEDICINE

## 2019-10-18 PROCEDURE — 99999 PR PBB SHADOW E&M-EST. PATIENT-LVL IV: ICD-10-PCS | Mod: PBBFAC,,, | Performed by: INTERNAL MEDICINE

## 2019-10-18 PROCEDURE — 99204 OFFICE O/P NEW MOD 45 MIN: CPT | Mod: S$GLB,,, | Performed by: INTERNAL MEDICINE

## 2019-10-18 PROCEDURE — 1101F PT FALLS ASSESS-DOCD LE1/YR: CPT | Mod: CPTII,S$GLB,, | Performed by: INTERNAL MEDICINE

## 2019-10-18 PROCEDURE — 1101F PR PT FALLS ASSESS DOC 0-1 FALLS W/OUT INJ PAST YR: ICD-10-PCS | Mod: CPTII,S$GLB,, | Performed by: INTERNAL MEDICINE

## 2019-10-18 PROCEDURE — 99999 PR PBB SHADOW E&M-EST. PATIENT-LVL IV: CPT | Mod: PBBFAC,,, | Performed by: INTERNAL MEDICINE

## 2019-10-18 NOTE — PROGRESS NOTES
INFECTIOUS DISEASE CLINIC  10/18/2019 4:30 PM    Subjective:      Chief Complaint:   Chief Complaint   Patient presents with    Follow-up     positive TB gold       History of Present Illness:    Patient Sherri Bunch is a 89 y.o. male with vascular dementia and atrial fibrillation on pradaxa who presents today for positive quantiferon. Pt's daughter reports that screening was done for nursing home placement. Says her father came to live with her ~2015 and quantiferon negative at that time, but now positive. She reports that he had chronic broinchitis (for about 30 years) that resolved about 10 years ago by taking alligator herb supplement. Denies cough or respiratory symptoms now. Pt awake and alert, but has a hard time hearing and doesn't speak english. Denies pain.       Pmhx:   pAfib-- Blood thinner  Urinary incontinence  alzheimers    sochx:  [employer]-   Retired when he was about 60's  Lived in [facility] until he moved here about 5 years ago  Smoked cigarettes x 10 years, quit many years ago      Current Outpatient Medications on File Prior to Visit   Medication Sig Dispense Refill    ALPRAZolam (XANAX) 0.25 MG tablet Take 1 tablet (0.25 mg total) by mouth nightly as needed for Anxiety. 30 tablet 3    CALCIUM CARBONATE/VITAMIN D3 (CALCIUM 600 WITH VITAMIN D3 ORAL) Take 1 tablet by mouth once daily.      diphenhydramine HCl (SIMPLY SLEEP ORAL) Take by mouth.      dronedarone (MULTAQ) 400 mg Tab Take 400 mg by mouth 2 (two) times daily with meals.       fish oil-omega-3 fatty acids 300-1,000 mg capsule Take 1 g by mouth once daily.      FLUZONE HIGH-DOSE 2019-20, PF, 180 mcg/0.5 mL Syrg TO BE ADMINISTERED BY PHARMACIST FOR IMMUNIZATION  0    hydrochlorothiazide (MICROZIDE) 12.5 mg capsule 12.5 mg once daily.       Lactobacillus acidophilus (PROBIOTIC) 10 billion cell Cap Take by mouth once daily.       LYCOPENE ORAL Take 5 mg by mouth once daily.      memantine (NAMENDA) 10  MG Tab Take 1 tablet (10 mg total) by mouth 2 (two) times daily. 60 tablet 11    metoprolol succinate (TOPROL-XL) 25 MG 24 hr tablet Take 25 mg by mouth once daily.       multivitamin (ONE DAILY MULTIVITAMIN) per tablet Take 1 tablet by mouth once daily.      PRADAXA 75 mg Cap       ranitidine (ZANTAC) 300 MG tablet TAKE 1 TABLET (300 MG TOTAL) BY MOUTH EVERY EVENING. 30 tablet 11    levocetirizine (XYZAL) 5 MG tablet Take 1 tablet (5 mg total) by mouth every evening. 30 tablet 11     No current facility-administered medications on file prior to visit.          Review of Symptoms:  Constitutional: Denies fevers, chills, or weakness.  ENT: Denies dysphagia, nasal discharge, ear pain or discharge.  Cardiovascular: Denies chest pain, palpitations, orthopnea, or claudication.  Respiratory: Denies shortness of breath, cough, hemoptysis, or wheezing.  GI: Denies nausea/vomitting, hematochezia, melena, abd pain, or changes in appetite.  : Denies dysuria, incontinence, or hematuria.  Musculoskeletal: Denies joint pain or myalgias.  Skin/breast: Denies rashes, lumps, lesions, or discharge.  Neurologic: Denies headache, dizziness, vertigo, or paresthesias.    Past Medical History:   Diagnosis Date    Atrial fibrillation     Diabetes     Diabetes mellitus, type 2     Hyperlipidemia     Hypertension        History reviewed. No pertinent surgical history.    Family History   Problem Relation Age of Onset    Heart disease Sister     No Known Problems Daughter     Heart disease Brother     No Known Problems Daughter        Social History     Socioeconomic History    Marital status:      Spouse name: Not on file    Number of children: 2    Years of education: Not on file    Highest education level: Not on file   Occupational History    Occupation: Retired   Social Needs    Financial resource strain: Not on file    Food insecurity:     Worry: Not on file     Inability: Not on file    Transportation  needs:     Medical: Not on file     Non-medical: Not on file   Tobacco Use    Smoking status: Former Smoker     Packs/day: 0.15     Start date: 1960     Last attempt to quit: 1970     Years since quittin.2    Smokeless tobacco: Never Used    Tobacco comment: Retired  in [place]   Substance and Sexual Activity    Alcohol use: No     Alcohol/week: 0.0 standard drinks    Drug use: No    Sexual activity: Not Currently     Partners: Female   Lifestyle    Physical activity:     Days per week: Not on file     Minutes per session: Not on file    Stress: Not on file   Relationships    Social connections:     Talks on phone: Not on file     Gets together: Not on file     Attends Bahai service: Not on file     Active member of club or organization: Not on file     Attends meetings of clubs or organizations: Not on file     Relationship status: Not on file   Other Topics Concern    Not on file   Social History Narrative    Not on file       Review of patient's allergies indicates:  No Known Allergies      Objective:   VS (24h):   Vitals:    10/18/19 1621   BP: (!) 157/73   Pulse: 70   Temp: 98.3 °F (36.8 °C)     General: Afebrile, alert, comfortable, no acute distress. wheelchair  HEENT: JUANY. EOMI, no scleral icterus. No sinus tenderness.   Pulmonary: Non labored,clear to auscultation A/P/L. No wheezing, crackles, or rhonchi.  Cardiac: normal S1 & S2 w/o rubs/murmurs/gallops. No JVD.   Abdominal: Non-tender, non-distended.  Extremities: Moves all extremities x 4. No peripheral edema. 2+ pulses. No tenderness of spine  Skin: No jaundice, rashes, or visible lesions.   Neurological:  Alert and oriented x 4.     Labs:  Glucose   Date Value Ref Range Status   2019 143 (H) 70 - 110 mg/dL Final   2019 145 (H) 70 - 110 mg/dL Final   2019 205 (H) 70 - 110 mg/dL Final     Calcium   Date Value Ref Range Status   2019 9.1 8.7 - 10.5 mg/dL Final    06/29/2019 8.7 8.7 - 10.5 mg/dL Final   06/28/2019 9.5 8.7 - 10.5 mg/dL Final     Albumin   Date Value Ref Range Status   08/31/2019 3.3 (L) 3.5 - 5.2 g/dL Final   06/28/2019 3.6 3.5 - 5.2 g/dL Final   08/18/2018 3.4 (L) 3.5 - 5.2 g/dL Final     Total Protein   Date Value Ref Range Status   08/31/2019 6.3 6.0 - 8.4 g/dL Final   06/28/2019 7.7 6.0 - 8.4 g/dL Final   08/18/2018 6.7 6.0 - 8.4 g/dL Final     Sodium   Date Value Ref Range Status   08/31/2019 137 136 - 145 mmol/L Final   06/29/2019 134 (L) 136 - 145 mmol/L Final   06/28/2019 136 136 - 145 mmol/L Final     Potassium   Date Value Ref Range Status   08/31/2019 4.2 3.5 - 5.1 mmol/L Final   06/29/2019 3.6 3.5 - 5.1 mmol/L Final   06/28/2019 4.1 3.5 - 5.1 mmol/L Final     CO2   Date Value Ref Range Status   08/31/2019 20 (L) 23 - 29 mmol/L Final   06/29/2019 25 23 - 29 mmol/L Final   06/28/2019 21 (L) 23 - 29 mmol/L Final     Chloride   Date Value Ref Range Status   08/31/2019 107 95 - 110 mmol/L Final   06/29/2019 101 95 - 110 mmol/L Final   06/28/2019 100 95 - 110 mmol/L Final     BUN, Bld   Date Value Ref Range Status   08/31/2019 21 10 - 30 mg/dL Final   06/29/2019 16 10 - 30 mg/dL Final   06/28/2019 22 10 - 30 mg/dL Final     Creatinine   Date Value Ref Range Status   08/31/2019 0.9 0.5 - 1.4 mg/dL Final   06/29/2019 0.9 0.5 - 1.4 mg/dL Final   06/28/2019 1.0 0.5 - 1.4 mg/dL Final     Alkaline Phosphatase   Date Value Ref Range Status   08/31/2019 85 55 - 135 U/L Final   06/28/2019 88 55 - 135 U/L Final   08/18/2018 87 55 - 135 U/L Final     ALT   Date Value Ref Range Status   08/31/2019 11 10 - 44 U/L Final   06/28/2019 17 10 - 44 U/L Final   08/18/2018 15 10 - 44 U/L Final     AST   Date Value Ref Range Status   08/31/2019 17 10 - 40 U/L Final   06/28/2019 33 10 - 40 U/L Final     Comment:     Specimen moderately hemolyzed   08/18/2018 23 10 - 40 U/L Final     Total Bilirubin   Date Value Ref Range Status   08/31/2019 0.7 0.1 - 1.0 mg/dL Final      Comment:     For infants and newborns, interpretation of results should be based  on gestational age, weight and in agreement with clinical  observations.  Premature Infant recommended reference ranges:  Up to 24 hours.............<8.0 mg/dL  Up to 48 hours............<12.0 mg/dL  3-5 days..................<15.0 mg/dL  6-29 days.................<15.0 mg/dL     06/28/2019 1.1 (H) 0.1 - 1.0 mg/dL Final     Comment:     For infants and newborns, interpretation of results should be based  on gestational age, weight and in agreement with clinical  observations.  Premature Infant recommended reference ranges:  Up to 24 hours.............<8.0 mg/dL  Up to 48 hours............<12.0 mg/dL  3-5 days..................<15.0 mg/dL  6-29 days.................<15.0 mg/dL     08/18/2018 1.1 (H) 0.1 - 1.0 mg/dL Final     Comment:     For infants and newborns, interpretation of results should be based  on gestational age, weight and in agreement with clinical  observations.  Premature Infant recommended reference ranges:  Up to 24 hours.............<8.0 mg/dL  Up to 48 hours............<12.0 mg/dL  3-5 days..................<15.0 mg/dL  6-29 days.................<15.0 mg/dL       WBC   Date Value Ref Range Status   08/31/2019 5.78 3.90 - 12.70 K/uL Final   06/29/2019 10.13 3.90 - 12.70 K/uL Final   06/28/2019 10.14 3.90 - 12.70 K/uL Final     Hemoglobin   Date Value Ref Range Status   08/31/2019 15.6 14.0 - 18.0 g/dL Final   06/29/2019 16.0 14.0 - 18.0 g/dL Final   06/28/2019 16.6 14.0 - 18.0 g/dL Final     Hematocrit   Date Value Ref Range Status   08/31/2019 46.7 40.0 - 54.0 % Final   06/29/2019 47.0 40.0 - 54.0 % Final   06/28/2019 49.1 40.0 - 54.0 % Final     Mean Corpuscular Volume   Date Value Ref Range Status   08/31/2019 90 82 - 98 fL Final   06/29/2019 88 82 - 98 fL Final   06/28/2019 89 82 - 98 fL Final     Platelets   Date Value Ref Range Status   08/31/2019 228 150 - 350 K/uL Final   06/29/2019 237 150 - 350 K/uL Final    06/28/2019 265 150 - 350 K/uL Final     Lab Results   Component Value Date    CHOL 174 08/31/2019    CHOL 184 08/18/2018    CHOL 164 08/12/2017     Lab Results   Component Value Date    HDL 37 (L) 08/31/2019    HDL 38 (L) 08/18/2018    HDL 39 (L) 08/12/2017     Lab Results   Component Value Date    LDLCALC 110.6 08/31/2019    LDLCALC 106.0 08/18/2018    LDLCALC 88.6 08/12/2017     Lab Results   Component Value Date    TRIG 132 08/31/2019    TRIG 200 (H) 08/18/2018    TRIG 182 (H) 08/12/2017     Lab Results   Component Value Date    CHOLHDL 21.3 08/31/2019    CHOLHDL 20.7 08/18/2018    CHOLHDL 23.8 08/12/2017     No results found for: RPR  No results found for: QUANTIFERON    Medications:  Current Outpatient Medications on File Prior to Visit   Medication Sig Dispense Refill    ALPRAZolam (XANAX) 0.25 MG tablet Take 1 tablet (0.25 mg total) by mouth nightly as needed for Anxiety. 30 tablet 3    CALCIUM CARBONATE/VITAMIN D3 (CALCIUM 600 WITH VITAMIN D3 ORAL) Take 1 tablet by mouth once daily.      diphenhydramine HCl (SIMPLY SLEEP ORAL) Take by mouth.      dronedarone (MULTAQ) 400 mg Tab Take 400 mg by mouth 2 (two) times daily with meals.       fish oil-omega-3 fatty acids 300-1,000 mg capsule Take 1 g by mouth once daily.      FLUZONE HIGH-DOSE 2019-20, PF, 180 mcg/0.5 mL Syrg TO BE ADMINISTERED BY PHARMACIST FOR IMMUNIZATION  0    hydrochlorothiazide (MICROZIDE) 12.5 mg capsule 12.5 mg once daily.       Lactobacillus acidophilus (PROBIOTIC) 10 billion cell Cap Take by mouth once daily.       LYCOPENE ORAL Take 5 mg by mouth once daily.      memantine (NAMENDA) 10 MG Tab Take 1 tablet (10 mg total) by mouth 2 (two) times daily. 60 tablet 11    metoprolol succinate (TOPROL-XL) 25 MG 24 hr tablet Take 25 mg by mouth once daily.       multivitamin (ONE DAILY MULTIVITAMIN) per tablet Take 1 tablet by mouth once daily.      PRADAXA 75 mg Cap       ranitidine (ZANTAC) 300 MG tablet TAKE 1 TABLET (300 MG  TOTAL) BY MOUTH EVERY EVENING. 30 tablet 11    levocetirizine (XYZAL) 5 MG tablet Take 1 tablet (5 mg total) by mouth every evening. 30 tablet 11     No current facility-administered medications on file prior to visit.        Antibiotics:   Antibiotics (From admission, onward)    None          HIV: No components found for: HIV 1/2 AG/AB  Hepatitis C IgG: No components found for: HEPATITIS C  Syphilis: No results found for: RPR    Hepatitis A IgG: No components found for: HEPATITIS A IGG  Hepatitis Bc IgG: No components found for: HEPATITIS B CORE IGG  Hepatitis Bs IgG:  Quantiferon: No results found for: QUANTIFERON  VZV IgG: No components found for: VARICELLA IGG    No components found for: SEDIMENTATION RATE  No components found for: C-REACTIVE PROTEIN      Microbiology x 7d:   Microbiology Results (last 7 days)     ** No results found for the last 168 hours. **          Immunization History   Administered Date(s) Administered    Hepatitis B, Pediatric/Adolescent 09/21/2015    Influenza - High Dose - PF (65 years and older) 10/10/2016, 10/15/2017, 10/05/2018, 09/09/2019    Tdap 09/21/2015, 03/19/2018    Zoster 08/20/2016         Assessment:     Latent tuberculosis     Plan:     Latent tuberculosis:   No signs or symptoms of active tuberculosis. CXR not c/w tuberculosis. Using the online TST/IGRA  at https://www.atyos0v.com/, annual risk of development of active TB is estimated to be 0.1% and the probability of clinically significant drug-induced hepatitis is 5% if treated with INH (and probability of hospitalization related to drug-induced hepatitis is 2.4%). Discussed risk/benefit of treatment of LTBI with patient's daughter and our shared decision is that the risk of side effects outweighs benefit of treatment in this 89 year old man. If their decision changes, would choose INH over rifamicin medication given anticoagulation and drug interactions. Counseled on side effects of LTBI regimens. LTBI  treatment cannot prevent the development of TB via new infection that can occur following LTBI treatment. Efficacy after 6 mo INH 41- 76% vs 9 months of INH 90-92%    rtc prn    Bailey Alexandre MD, MPH  Infectious Disease

## 2019-10-18 NOTE — LETTER
October 18, 2019      Ozzy Jerome MD  [address] [facility] [place] [address]           [facility] - Infectious Diseases  [address]  [facility] [address]  Phone: [phone]  Fax: [phone]          Patient: Sherri Bunch   MR Number: [id number]   YOB: 1927   Date of Visit: 10/18/2019       Dear Dr. Ozzy Jerome:    Thank you for referring Sherri Bunch to me for evaluation. Attached you will find relevant portions of my assessment and plan of care.    If you have questions, please do not hesitate to call me. I look forward to following Sherri Bunch along with you.    Sincerely,    Bailey Alexandre MD    Enclosure  CC:  No Recipients    If you would like to receive this communication electronically, please contact [email or web address] or [phone] to request more information on [email or web address] access.    For providers and/or their staff who would like to refer a patient to [facility], please contact us through our one-stop-shop provider referral line, [facility], at [phone].    If you feel you have received this communication in error or would no longer like to receive these types of communications, please e-mail [email or web address]

## 2020-10-05 ENCOUNTER — PATIENT MESSAGE (OUTPATIENT)
Dept: ADMINISTRATIVE | Facility: HOSPITAL | Age: 85
End: 2020-10-05

## 2021-01-04 ENCOUNTER — PATIENT MESSAGE (OUTPATIENT)
Dept: ADMINISTRATIVE | Facility: HOSPITAL | Age: 86
End: 2021-01-04

## 2021-04-05 ENCOUNTER — PATIENT MESSAGE (OUTPATIENT)
Dept: ADMINISTRATIVE | Facility: HOSPITAL | Age: 86
End: 2021-04-05

## 2021-07-06 ENCOUNTER — PATIENT MESSAGE (OUTPATIENT)
Dept: ADMINISTRATIVE | Facility: HOSPITAL | Age: 86
End: 2021-07-06

## 2021-10-04 ENCOUNTER — PATIENT MESSAGE (OUTPATIENT)
Dept: ADMINISTRATIVE | Facility: HOSPITAL | Age: 86
End: 2021-10-04

## 2022-01-25 ENCOUNTER — PATIENT MESSAGE (OUTPATIENT)
Dept: ADMINISTRATIVE | Facility: HOSPITAL | Age: 87
End: 2022-01-25
Payer: COMMERCIAL

## 2022-03-16 ENCOUNTER — PATIENT MESSAGE (OUTPATIENT)
Dept: ADMINISTRATIVE | Facility: HOSPITAL | Age: 87
End: 2022-03-16
Payer: COMMERCIAL

## 2024-10-26 NOTE — PROGRESS NOTES
HPI     DLS: 9/29/2016   Pt states no noticeable vision changes since last visit. Denies f/f    Systane ou qhs    DM  Hx Cats OU    Does not check BS, states controlled  Hemoglobin A1C       Date                     Value               Ref Range             Status                08/12/2017               7.1 (H)             4.0 - 5.6 %           Final                 01/07/2017               7.0 (H)             4.5 - 6.2 %           Final              ----------      Last edited by Elvin Del Castillo, OD on 9/29/2017  2:35 PM. (History)        ROS     Positive for: Endocrine (DM)    Negative for: Constitutional, Gastrointestinal, Neurological, Skin,   Genitourinary, Musculoskeletal, HENT, Cardiovascular, Eyes, Respiratory,   Psychiatric, Allergic/Imm, Heme/Lymph    Last edited by Elvin Del Castillo, OD on 9/29/2017  2:35 PM. (History)        Assessment /Plan     For exam results, see Encounter Report.    Type 2 diabetes mellitus without retinopathy    Nuclear sclerosis, bilateral    Screening for glaucoma    Presbyopia OU      ESL pt--daughter present to translate      1. Cat OU--pt happy w otc readers--wishes to wait on surgery  2. DEE--advised SYS BAL ATs prn  3. DM- WITHOUT RETINOPATHY.  Advised yearly DFE    PLAN:    rtc 1 yr                  Is This A New Presentation, Or A Follow-Up?: Sun Spots